# Patient Record
Sex: FEMALE | Race: WHITE | Employment: OTHER | URBAN - METROPOLITAN AREA
[De-identification: names, ages, dates, MRNs, and addresses within clinical notes are randomized per-mention and may not be internally consistent; named-entity substitution may affect disease eponyms.]

---

## 2017-01-18 ENCOUNTER — OFFICE VISIT (OUTPATIENT)
Dept: FAMILY MEDICINE CLINIC | Age: 82
End: 2017-01-18

## 2017-01-18 VITALS
BODY MASS INDEX: 26.03 KG/M2 | HEART RATE: 78 BPM | WEIGHT: 124 LBS | SYSTOLIC BLOOD PRESSURE: 136 MMHG | DIASTOLIC BLOOD PRESSURE: 70 MMHG | HEIGHT: 58 IN | RESPIRATION RATE: 16 BRPM | TEMPERATURE: 97.6 F

## 2017-01-18 DIAGNOSIS — E53.8 VITAMIN B12 DEFICIENCY: ICD-10-CM

## 2017-01-18 DIAGNOSIS — E78.2 MIXED HYPERLIPIDEMIA: ICD-10-CM

## 2017-01-18 DIAGNOSIS — M19.90 OSTEOARTHRITIS, UNSPECIFIED OSTEOARTHRITIS TYPE, UNSPECIFIED SITE: ICD-10-CM

## 2017-01-18 DIAGNOSIS — E11.8 TYPE 2 DIABETES MELLITUS WITH COMPLICATION, UNSPECIFIED LONG TERM INSULIN USE STATUS: ICD-10-CM

## 2017-01-18 DIAGNOSIS — Z23 NEED FOR STREPTOCOCCUS PNEUMONIAE AND INFLUENZA VACCINATION: ICD-10-CM

## 2017-01-18 DIAGNOSIS — I25.10 CORONARY ARTERY DISEASE, ANGINA PRESENCE UNSPECIFIED, UNSPECIFIED VESSEL OR LESION TYPE, UNSPECIFIED WHETHER NATIVE OR TRANSPLANTED HEART: ICD-10-CM

## 2017-01-18 DIAGNOSIS — Z74.09 IMPAIRED MOBILITY AND ACTIVITIES OF DAILY LIVING: ICD-10-CM

## 2017-01-18 DIAGNOSIS — R42 DIZZINESS, NONSPECIFIC: ICD-10-CM

## 2017-01-18 DIAGNOSIS — E11.8 TYPE 2 DIABETES MELLITUS WITH COMPLICATION, UNSPECIFIED LONG TERM INSULIN USE STATUS: Primary | ICD-10-CM

## 2017-01-18 DIAGNOSIS — Z91.81 AT HIGH RISK FOR FALLS: ICD-10-CM

## 2017-01-18 DIAGNOSIS — M75.111 INCOMPLETE TEAR OF RIGHT ROTATOR CUFF: ICD-10-CM

## 2017-01-18 DIAGNOSIS — D64.9 CHRONIC ANEMIA: ICD-10-CM

## 2017-01-18 DIAGNOSIS — Z78.9 IMPAIRED MOBILITY AND ACTIVITIES OF DAILY LIVING: ICD-10-CM

## 2017-01-18 LAB
ALBUMIN SERPL-MCNC: 4.3 G/DL (ref 3.9–4.9)
ALP BLD-CCNC: 70 U/L (ref 40–130)
ALT SERPL-CCNC: 12 U/L (ref 0–33)
ANION GAP SERPL CALCULATED.3IONS-SCNC: 14 MEQ/L (ref 7–13)
AST SERPL-CCNC: 17 U/L (ref 0–35)
BILIRUB SERPL-MCNC: 0.3 MG/DL (ref 0–1.2)
BUN BLDV-MCNC: 14 MG/DL (ref 8–23)
CALCIUM SERPL-MCNC: 9.6 MG/DL (ref 8.6–10.2)
CHLORIDE BLD-SCNC: 103 MEQ/L (ref 98–107)
CHOLESTEROL, TOTAL: 237 MG/DL (ref 0–199)
CO2: 21 MEQ/L (ref 22–29)
CREAT SERPL-MCNC: 0.9 MG/DL (ref 0.5–0.9)
GFR AFRICAN AMERICAN: >60
GFR NON-AFRICAN AMERICAN: 59
GLOBULIN: 3.2 G/DL (ref 2.3–3.5)
GLUCOSE BLD-MCNC: 99 MG/DL (ref 74–109)
HBA1C MFR BLD: 6.2 % (ref 4.8–5.9)
HCT VFR BLD CALC: 34.8 % (ref 37–47)
HDLC SERPL-MCNC: 70 MG/DL (ref 40–59)
HEMOGLOBIN: 11.2 G/DL (ref 12–16)
LDL CHOLESTEROL CALCULATED: 142 MG/DL (ref 0–129)
MCH RBC QN AUTO: 27 PG (ref 27–31.3)
MCHC RBC AUTO-ENTMCNC: 32.2 % (ref 33–37)
MCV RBC AUTO: 83.9 FL (ref 82–100)
PDW BLD-RTO: 14.4 % (ref 11.5–14.5)
PLATELET # BLD: 247 K/UL (ref 130–400)
POTASSIUM SERPL-SCNC: 4 MEQ/L (ref 3.5–5.1)
RBC # BLD: 4.14 M/UL (ref 4.2–5.4)
SODIUM BLD-SCNC: 138 MEQ/L (ref 132–144)
T4 FREE: 1.14 NG/DL (ref 0.93–1.7)
TOTAL PROTEIN: 7.5 G/DL (ref 6.4–8.1)
TRIGL SERPL-MCNC: 125 MG/DL (ref 0–200)
TSH SERPL DL<=0.05 MIU/L-ACNC: 2.07 UIU/ML (ref 0.27–4.2)
WBC # BLD: 7.6 K/UL (ref 4.8–10.8)

## 2017-01-18 PROCEDURE — 90732 PPSV23 VACC 2 YRS+ SUBQ/IM: CPT | Performed by: FAMILY MEDICINE

## 2017-01-18 PROCEDURE — 99214 OFFICE O/P EST MOD 30 MIN: CPT | Performed by: FAMILY MEDICINE

## 2017-01-18 PROCEDURE — 3288F FALL RISK ASSESSMENT DOCD: CPT | Performed by: FAMILY MEDICINE

## 2017-01-18 PROCEDURE — G0009 ADMIN PNEUMOCOCCAL VACCINE: HCPCS | Performed by: FAMILY MEDICINE

## 2017-01-18 PROCEDURE — 90662 IIV NO PRSV INCREASED AG IM: CPT | Performed by: FAMILY MEDICINE

## 2017-01-18 PROCEDURE — G0008 ADMIN INFLUENZA VIRUS VAC: HCPCS | Performed by: FAMILY MEDICINE

## 2017-01-18 PROCEDURE — 96372 THER/PROPH/DIAG INJ SC/IM: CPT | Performed by: FAMILY MEDICINE

## 2017-01-18 RX ORDER — ATORVASTATIN CALCIUM 80 MG/1
80 TABLET, FILM COATED ORAL DAILY
Qty: 90 TABLET | Refills: 1 | Status: SHIPPED | OUTPATIENT
Start: 2017-01-18 | End: 2019-03-09

## 2017-01-18 RX ORDER — NITROGLYCERIN 0.4 MG/1
TABLET SUBLINGUAL
Qty: 25 TABLET | Refills: 3 | Status: SHIPPED | OUTPATIENT
Start: 2017-01-18 | End: 2019-03-09

## 2017-01-18 RX ORDER — KETOROLAC TROMETHAMINE 30 MG/ML
60 INJECTION, SOLUTION INTRAMUSCULAR; INTRAVENOUS ONCE
Status: COMPLETED | OUTPATIENT
Start: 2017-01-18 | End: 2017-01-18

## 2017-01-18 RX ORDER — KETOROLAC TROMETHAMINE 30 MG/ML
30 INJECTION, SOLUTION INTRAMUSCULAR; INTRAVENOUS ONCE
Qty: 1 ML | Refills: 0
Start: 2017-01-18 | End: 2017-01-18 | Stop reason: CLARIF

## 2017-01-18 RX ORDER — ISOSORBIDE MONONITRATE 30 MG/1
TABLET, EXTENDED RELEASE ORAL
Qty: 90 TABLET | Refills: 1 | Status: SHIPPED | OUTPATIENT
Start: 2017-01-18 | End: 2019-03-09

## 2017-01-18 RX ORDER — MECLIZINE HCL 12.5 MG/1
12.5 TABLET ORAL 3 TIMES DAILY PRN
Qty: 200 TABLET | Refills: 1 | Status: SHIPPED | OUTPATIENT
Start: 2017-01-18 | End: 2019-03-09

## 2017-01-18 RX ORDER — METOPROLOL TARTRATE 50 MG/1
TABLET, FILM COATED ORAL
Qty: 180 TABLET | Refills: 1 | Status: SHIPPED | OUTPATIENT
Start: 2017-01-18 | End: 2019-03-09

## 2017-01-18 RX ORDER — CLOPIDOGREL BISULFATE 75 MG/1
TABLET ORAL
Qty: 90 TABLET | Refills: 1 | Status: SHIPPED | OUTPATIENT
Start: 2017-01-18 | End: 2017-03-16 | Stop reason: SDUPTHER

## 2017-01-18 RX ADMIN — KETOROLAC TROMETHAMINE 60 MG: 30 INJECTION, SOLUTION INTRAMUSCULAR; INTRAVENOUS at 16:33

## 2017-01-25 ENCOUNTER — HOSPITAL ENCOUNTER (EMERGENCY)
Age: 82
Discharge: HOME | End: 2017-01-25
Attending: EMERGENCY MEDICINE
Payer: MEDICARE

## 2017-01-25 ENCOUNTER — APPOINTMENT (OUTPATIENT)
Dept: CT IMAGING | Age: 82
End: 2017-01-25
Payer: MEDICARE

## 2017-01-25 VITALS
RESPIRATION RATE: 16 BRPM | TEMPERATURE: 97.5 F | SYSTOLIC BLOOD PRESSURE: 150 MMHG | OXYGEN SATURATION: 99 % | DIASTOLIC BLOOD PRESSURE: 70 MMHG | HEART RATE: 83 BPM

## 2017-01-25 DIAGNOSIS — S09.90XA HEAD INJURY, INITIAL ENCOUNTER: Primary | ICD-10-CM

## 2017-01-25 DIAGNOSIS — S01.81XA FACIAL LACERATION, INITIAL ENCOUNTER: ICD-10-CM

## 2017-01-25 LAB
ANION GAP SERPL CALCULATED.3IONS-SCNC: 17 MEQ/L (ref 7–13)
BASOPHILS ABSOLUTE: 0.1 K/UL (ref 0–0.2)
BASOPHILS RELATIVE PERCENT: 1.2 %
BUN BLDV-MCNC: 15 MG/DL (ref 8–23)
CALCIUM SERPL-MCNC: 10 MG/DL (ref 8.6–10.2)
CHLORIDE BLD-SCNC: 100 MEQ/L (ref 98–107)
CO2: 22 MEQ/L (ref 22–29)
CREAT SERPL-MCNC: 1.02 MG/DL (ref 0.5–0.9)
EOSINOPHILS ABSOLUTE: 1.8 K/UL (ref 0–0.7)
EOSINOPHILS RELATIVE PERCENT: 23.6 %
GFR AFRICAN AMERICAN: >60
GFR NON-AFRICAN AMERICAN: 51
GLUCOSE BLD-MCNC: 136 MG/DL (ref 74–109)
HCT VFR BLD CALC: 38.6 % (ref 37–47)
HEMOGLOBIN: 12.3 G/DL (ref 12–16)
INR BLD: 1
LYMPHOCYTES ABSOLUTE: 1.8 K/UL (ref 1–4.8)
LYMPHOCYTES RELATIVE PERCENT: 24.1 %
MCH RBC QN AUTO: 27.1 PG (ref 27–31.3)
MCHC RBC AUTO-ENTMCNC: 31.8 % (ref 33–37)
MCV RBC AUTO: 85.1 FL (ref 82–100)
MONOCYTES ABSOLUTE: 0.6 K/UL (ref 0.2–0.8)
MONOCYTES RELATIVE PERCENT: 7.4 %
NEUTROPHILS ABSOLUTE: 3.3 K/UL (ref 1.4–6.5)
NEUTROPHILS RELATIVE PERCENT: 43.7 %
PDW BLD-RTO: 14.8 % (ref 11.5–14.5)
PLATELET # BLD: 357 K/UL (ref 130–400)
POTASSIUM SERPL-SCNC: 4.2 MEQ/L (ref 3.5–5.1)
PROTHROMBIN TIME: 10.5 SEC (ref 8.1–13.7)
RBC # BLD: 4.54 M/UL (ref 4.2–5.4)
SODIUM BLD-SCNC: 139 MEQ/L (ref 132–144)
WBC # BLD: 7.6 K/UL (ref 4.8–10.8)

## 2017-01-25 PROCEDURE — 85610 PROTHROMBIN TIME: CPT

## 2017-01-25 PROCEDURE — 70480 CT ORBIT/EAR/FOSSA W/O DYE: CPT

## 2017-01-25 PROCEDURE — 70450 CT HEAD/BRAIN W/O DYE: CPT

## 2017-01-25 PROCEDURE — 36415 COLL VENOUS BLD VENIPUNCTURE: CPT

## 2017-01-25 PROCEDURE — 6370000000 HC RX 637 (ALT 250 FOR IP): Performed by: EMERGENCY MEDICINE

## 2017-01-25 PROCEDURE — 80048 BASIC METABOLIC PNL TOTAL CA: CPT

## 2017-01-25 PROCEDURE — 99284 EMERGENCY DEPT VISIT MOD MDM: CPT

## 2017-01-25 PROCEDURE — 72125 CT NECK SPINE W/O DYE: CPT

## 2017-01-25 PROCEDURE — 85025 COMPLETE CBC W/AUTO DIFF WBC: CPT

## 2017-01-25 RX ORDER — ACETAMINOPHEN 325 MG/1
650 TABLET ORAL ONCE
Status: COMPLETED | OUTPATIENT
Start: 2017-01-25 | End: 2017-01-25

## 2017-01-25 RX ADMIN — ACETAMINOPHEN 650 MG: 325 TABLET ORAL at 19:31

## 2017-01-25 RX ADMIN — Medication: at 19:20

## 2017-01-25 ASSESSMENT — PAIN SCALES - GENERAL
PAINLEVEL_OUTOF10: 7
PAINLEVEL_OUTOF10: 6

## 2017-01-25 ASSESSMENT — PAIN DESCRIPTION - PAIN TYPE: TYPE: ACUTE PAIN

## 2017-01-25 ASSESSMENT — ENCOUNTER SYMPTOMS
SORE THROAT: 0
EYE PAIN: 0
NAUSEA: 0
SHORTNESS OF BREATH: 0
ABDOMINAL PAIN: 0
VOMITING: 0
CHEST TIGHTNESS: 0

## 2017-01-25 ASSESSMENT — PAIN DESCRIPTION - LOCATION: LOCATION: HEAD

## 2017-03-16 DIAGNOSIS — I25.10 CORONARY ARTERY DISEASE, ANGINA PRESENCE UNSPECIFIED, UNSPECIFIED VESSEL OR LESION TYPE, UNSPECIFIED WHETHER NATIVE OR TRANSPLANTED HEART: ICD-10-CM

## 2017-03-16 DIAGNOSIS — E11.8 TYPE 2 DIABETES MELLITUS WITH COMPLICATION, UNSPECIFIED LONG TERM INSULIN USE STATUS: ICD-10-CM

## 2017-03-17 RX ORDER — CLOPIDOGREL BISULFATE 75 MG/1
TABLET ORAL
Qty: 90 TABLET | Refills: 1 | Status: SHIPPED | OUTPATIENT
Start: 2017-03-17 | End: 2017-09-26 | Stop reason: SDUPTHER

## 2017-06-18 RX ORDER — DONEPEZIL HYDROCHLORIDE 5 MG/1
TABLET, FILM COATED ORAL
Qty: 30 TABLET | Refills: 4 | Status: SHIPPED | OUTPATIENT
Start: 2017-06-18 | End: 2017-11-25 | Stop reason: SDUPTHER

## 2017-09-26 DIAGNOSIS — I25.10 CORONARY ARTERY DISEASE, ANGINA PRESENCE UNSPECIFIED, UNSPECIFIED VESSEL OR LESION TYPE, UNSPECIFIED WHETHER NATIVE OR TRANSPLANTED HEART: ICD-10-CM

## 2017-09-26 RX ORDER — MECLIZINE HCL 12.5 MG/1
TABLET ORAL
Qty: 60 TABLET | Refills: 2 | Status: SHIPPED | OUTPATIENT
Start: 2017-09-26 | End: 2019-03-09

## 2017-09-26 RX ORDER — CLOPIDOGREL BISULFATE 75 MG/1
TABLET ORAL
Qty: 90 TABLET | Refills: 0 | Status: SHIPPED | OUTPATIENT
Start: 2017-09-26 | End: 2018-01-22 | Stop reason: SDUPTHER

## 2017-10-26 DIAGNOSIS — E11.8 TYPE 2 DIABETES MELLITUS WITH COMPLICATION, UNSPECIFIED LONG TERM INSULIN USE STATUS: ICD-10-CM

## 2017-11-27 RX ORDER — DONEPEZIL HYDROCHLORIDE 5 MG/1
TABLET, FILM COATED ORAL
Qty: 30 TABLET | Refills: 5 | Status: SHIPPED | OUTPATIENT
Start: 2017-11-27 | End: 2018-04-02 | Stop reason: SDUPTHER

## 2017-12-19 ENCOUNTER — HOSPITAL ENCOUNTER (EMERGENCY)
Age: 82
Discharge: HOME | End: 2017-12-19
Payer: MEDICARE

## 2017-12-19 ENCOUNTER — APPOINTMENT (OUTPATIENT)
Dept: GENERAL RADIOLOGY | Age: 82
End: 2017-12-19
Payer: MEDICARE

## 2017-12-19 VITALS
OXYGEN SATURATION: 98 % | HEART RATE: 66 BPM | BODY MASS INDEX: 25.92 KG/M2 | RESPIRATION RATE: 16 BRPM | TEMPERATURE: 97.9 F | WEIGHT: 124 LBS | SYSTOLIC BLOOD PRESSURE: 160 MMHG | DIASTOLIC BLOOD PRESSURE: 65 MMHG

## 2017-12-19 DIAGNOSIS — L03.114 CELLULITIS OF LEFT ARM: Primary | ICD-10-CM

## 2017-12-19 LAB
ALBUMIN SERPL-MCNC: 4.1 G/DL (ref 3.9–4.9)
ALP BLD-CCNC: 64 U/L (ref 40–130)
ALT SERPL-CCNC: 11 U/L (ref 0–33)
ANION GAP SERPL CALCULATED.3IONS-SCNC: 15 MEQ/L (ref 7–13)
AST SERPL-CCNC: 19 U/L (ref 0–35)
BASOPHILS ABSOLUTE: 0.1 K/UL (ref 0–0.2)
BASOPHILS RELATIVE PERCENT: 0.7 %
BILIRUB SERPL-MCNC: 0.3 MG/DL (ref 0–1.2)
BUN BLDV-MCNC: 11 MG/DL (ref 8–23)
CALCIUM SERPL-MCNC: 9.6 MG/DL (ref 8.6–10.2)
CHLORIDE BLD-SCNC: 101 MEQ/L (ref 98–107)
CO2: 24 MEQ/L (ref 22–29)
CREAT SERPL-MCNC: 0.82 MG/DL (ref 0.5–0.9)
EOSINOPHILS ABSOLUTE: 1.6 K/UL (ref 0–0.7)
EOSINOPHILS RELATIVE PERCENT: 21.7 %
GFR AFRICAN AMERICAN: >60
GFR NON-AFRICAN AMERICAN: >60
GLOBULIN: 3.1 G/DL (ref 2.3–3.5)
GLUCOSE BLD-MCNC: 117 MG/DL (ref 74–109)
HCT VFR BLD CALC: 37.8 % (ref 37–47)
HEMOGLOBIN: 12.9 G/DL (ref 12–16)
LACTIC ACID: 1.1 MMOL/L (ref 0.5–2.2)
LYMPHOCYTES ABSOLUTE: 1.4 K/UL (ref 1–4.8)
LYMPHOCYTES RELATIVE PERCENT: 19.6 %
MCH RBC QN AUTO: 29.8 PG (ref 27–31.3)
MCHC RBC AUTO-ENTMCNC: 34.1 % (ref 33–37)
MCV RBC AUTO: 87.4 FL (ref 82–100)
MONOCYTES ABSOLUTE: 0.6 K/UL (ref 0.2–0.8)
MONOCYTES RELATIVE PERCENT: 7.6 %
NEUTROPHILS ABSOLUTE: 3.7 K/UL (ref 1.4–6.5)
NEUTROPHILS RELATIVE PERCENT: 50.4 %
PDW BLD-RTO: 13.5 % (ref 11.5–14.5)
PLATELET # BLD: 239 K/UL (ref 130–400)
POTASSIUM SERPL-SCNC: 4 MEQ/L (ref 3.5–5.1)
RBC # BLD: 4.32 M/UL (ref 4.2–5.4)
SODIUM BLD-SCNC: 140 MEQ/L (ref 132–144)
TOTAL PROTEIN: 7.2 G/DL (ref 6.4–8.1)
WBC # BLD: 7.3 K/UL (ref 4.8–10.8)

## 2017-12-19 PROCEDURE — 99283 EMERGENCY DEPT VISIT LOW MDM: CPT

## 2017-12-19 PROCEDURE — 85025 COMPLETE CBC W/AUTO DIFF WBC: CPT

## 2017-12-19 PROCEDURE — 87077 CULTURE AEROBIC IDENTIFY: CPT

## 2017-12-19 PROCEDURE — 36415 COLL VENOUS BLD VENIPUNCTURE: CPT

## 2017-12-19 PROCEDURE — 87205 SMEAR GRAM STAIN: CPT

## 2017-12-19 PROCEDURE — 87070 CULTURE OTHR SPECIMN AEROBIC: CPT

## 2017-12-19 PROCEDURE — 87040 BLOOD CULTURE FOR BACTERIA: CPT

## 2017-12-19 PROCEDURE — 80053 COMPREHEN METABOLIC PANEL: CPT

## 2017-12-19 PROCEDURE — 83605 ASSAY OF LACTIC ACID: CPT

## 2017-12-19 PROCEDURE — 87075 CULTR BACTERIA EXCEPT BLOOD: CPT

## 2017-12-19 PROCEDURE — 73090 X-RAY EXAM OF FOREARM: CPT

## 2017-12-19 PROCEDURE — 6370000000 HC RX 637 (ALT 250 FOR IP): Performed by: PHYSICIAN ASSISTANT

## 2017-12-19 RX ORDER — SULFAMETHOXAZOLE AND TRIMETHOPRIM 800; 160 MG/1; MG/1
1 TABLET ORAL 2 TIMES DAILY
Qty: 20 TABLET | Refills: 0 | Status: SHIPPED | OUTPATIENT
Start: 2017-12-19 | End: 2017-12-29

## 2017-12-19 RX ORDER — CEPHALEXIN 500 MG/1
500 CAPSULE ORAL ONCE
Status: COMPLETED | OUTPATIENT
Start: 2017-12-19 | End: 2017-12-19

## 2017-12-19 RX ORDER — 0.9 % SODIUM CHLORIDE 0.9 %
1000 INTRAVENOUS SOLUTION INTRAVENOUS ONCE
Status: DISCONTINUED | OUTPATIENT
Start: 2017-12-19 | End: 2017-12-19

## 2017-12-19 RX ORDER — SULFAMETHOXAZOLE AND TRIMETHOPRIM 800; 160 MG/1; MG/1
1 TABLET ORAL ONCE
Status: COMPLETED | OUTPATIENT
Start: 2017-12-19 | End: 2017-12-19

## 2017-12-19 RX ORDER — CEPHALEXIN 500 MG/1
500 CAPSULE ORAL 4 TIMES DAILY
Qty: 40 CAPSULE | Refills: 0 | Status: SHIPPED | OUTPATIENT
Start: 2017-12-19 | End: 2019-03-09

## 2017-12-19 RX ADMIN — CEPHALEXIN 500 MG: 500 CAPSULE ORAL at 19:31

## 2017-12-19 RX ADMIN — SULFAMETHOXAZOLE AND TRIMETHOPRIM 1 TABLET: 800; 160 TABLET ORAL at 19:31

## 2017-12-19 ASSESSMENT — ENCOUNTER SYMPTOMS
VOMITING: 0
ABDOMINAL PAIN: 0
NAUSEA: 0
SHORTNESS OF BREATH: 0
DIARRHEA: 0
COUGH: 0

## 2017-12-19 NOTE — ED PROVIDER NOTES
[address] [facility] ED  eMERGENCY dEPARTMENT eNCOUnter      Pt Name: Pete Salas  MRN: [id number]  [place] 2/10/1928  Date of evaluation: 12/19/2017  Provider: Flakita Dodson PA-C      HISTORY OF PRESENT ILLNESS    Pete Salas is a 80 y.o. female who presents to the Emergency Department with Wound on left forearm. The patient states she was cleaning something and her forearm got caught. Over the past 4 days there has been increased redness swelling and warmth. Patient does have a history of cellulitis that required IV antibiotics and admission to the hospital.  She denies fevers or chills. She denies pain. She has not used any medication. REVIEW OF SYSTEMS       Review of Systems   Constitutional: Negative for chills, diaphoresis, fatigue and fever. HENT: Negative for congestion. Respiratory: Negative for cough and shortness of breath. Cardiovascular: Negative for chest pain and palpitations. Gastrointestinal: Negative for abdominal pain, diarrhea, nausea and vomiting. Genitourinary: Negative for dysuria and flank pain. Skin: Positive for wound. Negative for rash. Neurological: Negative for dizziness, light-headedness and headaches.          PAST MEDICAL HISTORY     Past Medical History:   Diagnosis Date    CAD (coronary artery disease)     Chronic anemia     [place] (hard of hearing)     Hyperlipidemia     Hypertension     Type II or unspecified type diabetes mellitus without mention of complication, not stated as uncontrolled          SURGICAL HISTORY       Past Surgical History:   Procedure Laterality Date    BLADDER SUSPENSION      CARDIAC CATHETERIZATION      CATARACT REMOVAL      CHOLECYSTECTOMY  1990    COLONOSCOPY  1-07    CORONARY ANGIOPLASTY WITH STENT PLACEMENT      CORONARY ANGIOPLASTY WITH STENT PLACEMENT  5-08    FOOT SURGERY      L    HYSTERECTOMY  1964         CURRENT MEDICATIONS       Previous Medications    ASPIRIN 81 MG TABLET    Take 81 mg by mouth time. She appears well-developed and well-nourished. She is active. No distress. HENT:   Head: Normocephalic and atraumatic. Mouth/Throat: Mucous membranes are normal.   Neck: Normal range of motion. Neck supple. Cardiovascular: Normal rate, regular rhythm, normal heart sounds, intact distal pulses and normal pulses. Pulmonary/Chest: Effort normal and breath sounds normal.   Abdominal: Soft. Normal appearance and bowel sounds are normal. There is no tenderness. Musculoskeletal:        Arms:  Is superficial abrasion noted to posterior forearm. Area of surrounding cellulitis or redness and warmth is noted. Neurological: She is alert and oriented to person, place, and time. She has normal strength. Skin: Skin is warm, dry and intact. No rash noted. She is not diaphoretic. Nursing note and vitals reviewed. All other labs were within normal range or not returned as of this dictation. EMERGENCY DEPARTMENT COURSE and DIFFERENTIAL DIAGNOSIS/MDM:   Vitals:    Vitals:    12/19/17 1736   BP: (!) 183/89   Pulse: 92   Resp: 18   Temp: 97.9 °F (36.6 °C)   TempSrc: Oral   SpO2: 97%   Weight: 124 lb (56.2 kg)       ED Course        Patient presents with cellulitis of left arm. It is not circumferential.  It is localized with a surrounding area of erythema and warmth. Patient has been afebrile and not tachycardic. Patient does not have a white count. At this time either the patient is well suited to try outpatient therapy. Patient also does not want to stay in the hospital.  Lipase factors I believe the patient is safe to be outpatient. She was counseled on signs and symptoms are felt outpatient therapy and when to return to the emergency department. I advised him to follow-up with her family physician in one to 2 days for wound recheck. I advised him on cleaning and bacitracin to the area. I advised him to return to the emergency department for new or worsening symptoms.   Patient family both verbalized understanding of this plan. Patient stable and ready for discharge. PROCEDURES:  Unless otherwise noted below, none     Procedures      FINAL IMPRESSION      1.  Cellulitis of left arm          DISPOSITION/PLAN   DISPOSITION Decision To Discharge 12/19/2017 07:24:31 PM          [address] PA-C (electronically signed)  Attending Emergency Physician       [address], PA-C  12/19/17 192

## 2017-12-22 LAB
ANAEROBIC CULTURE: ABNORMAL
GRAM STAIN RESULT: ABNORMAL
ORGANISM: ABNORMAL
WOUND/ABSCESS: ABNORMAL

## 2017-12-25 LAB
BLOOD CULTURE, ROUTINE: NORMAL
CULTURE, BLOOD 2: NORMAL

## 2018-01-22 DIAGNOSIS — I25.10 CORONARY ARTERY DISEASE, ANGINA PRESENCE UNSPECIFIED, UNSPECIFIED VESSEL OR LESION TYPE, UNSPECIFIED WHETHER NATIVE OR TRANSPLANTED HEART: ICD-10-CM

## 2018-01-22 RX ORDER — CLOPIDOGREL BISULFATE 75 MG/1
TABLET ORAL
Qty: 90 TABLET | Refills: 0 | Status: SHIPPED | OUTPATIENT
Start: 2018-01-22 | End: 2019-03-09 | Stop reason: SDUPTHER

## 2018-02-02 DIAGNOSIS — I25.10 CORONARY ARTERY DISEASE, ANGINA PRESENCE UNSPECIFIED, UNSPECIFIED VESSEL OR LESION TYPE, UNSPECIFIED WHETHER NATIVE OR TRANSPLANTED HEART: ICD-10-CM

## 2018-02-02 DIAGNOSIS — E11.8 TYPE 2 DIABETES MELLITUS WITH COMPLICATION, UNSPECIFIED LONG TERM INSULIN USE STATUS: ICD-10-CM

## 2018-02-03 RX ORDER — CLOPIDOGREL BISULFATE 75 MG/1
TABLET ORAL
Qty: 90 TABLET | Refills: 0 | Status: SHIPPED | OUTPATIENT
Start: 2018-02-03 | End: 2018-08-27 | Stop reason: SDUPTHER

## 2018-04-02 ENCOUNTER — TELEPHONE (OUTPATIENT)
Dept: FAMILY MEDICINE CLINIC | Age: 83
End: 2018-04-02

## 2018-04-02 RX ORDER — DONEPEZIL HYDROCHLORIDE 5 MG/1
TABLET, FILM COATED ORAL
Qty: 30 TABLET | Refills: 5 | Status: SHIPPED | OUTPATIENT
Start: 2018-04-02 | End: 2019-03-11 | Stop reason: SDUPTHER

## 2018-05-01 DIAGNOSIS — E11.8 TYPE 2 DIABETES MELLITUS WITH COMPLICATION, UNSPECIFIED LONG TERM INSULIN USE STATUS: ICD-10-CM

## 2018-05-02 RX ORDER — NITROGLYCERIN 0.4 MG/1
TABLET SUBLINGUAL
Qty: 25 TABLET | Refills: 1 | Status: SHIPPED | OUTPATIENT
Start: 2018-05-02

## 2018-08-27 DIAGNOSIS — I25.10 CORONARY ARTERY DISEASE, ANGINA PRESENCE UNSPECIFIED, UNSPECIFIED VESSEL OR LESION TYPE, UNSPECIFIED WHETHER NATIVE OR TRANSPLANTED HEART: ICD-10-CM

## 2018-08-27 RX ORDER — CLOPIDOGREL BISULFATE 75 MG/1
TABLET ORAL
Qty: 90 TABLET | Refills: 0 | Status: SHIPPED | OUTPATIENT
Start: 2018-08-27 | End: 2019-03-11 | Stop reason: SDUPTHER

## 2018-09-29 ENCOUNTER — APPOINTMENT (OUTPATIENT)
Dept: GENERAL RADIOLOGY | Age: 83
End: 2018-09-29
Payer: MEDICARE

## 2018-09-29 ENCOUNTER — HOSPITAL ENCOUNTER (EMERGENCY)
Age: 83
Discharge: HOME | End: 2018-09-30
Attending: EMERGENCY MEDICINE
Payer: MEDICARE

## 2018-09-29 ENCOUNTER — APPOINTMENT (OUTPATIENT)
Dept: CT IMAGING | Age: 83
End: 2018-09-29
Payer: MEDICARE
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
## 2018-09-29 DIAGNOSIS — M25.521 RIGHT ELBOW PAIN: ICD-10-CM

## 2018-09-29 DIAGNOSIS — S09.90XA CLOSED HEAD INJURY, INITIAL ENCOUNTER: ICD-10-CM

## 2018-09-29 LAB
ALBUMIN SERPL-MCNC: 4.1 G/DL (ref 3.9–4.9)
ALP BLD-CCNC: 66 U/L (ref 40–130)
ALT SERPL-CCNC: 9 U/L (ref 0–33)
ANION GAP SERPL CALCULATED.3IONS-SCNC: 9 MEQ/L (ref 7–13)
AST SERPL-CCNC: 17 U/L (ref 0–35)
BASOPHILS ABSOLUTE: 0.1 K/UL (ref 0–0.2)
BASOPHILS RELATIVE PERCENT: 0.9 %
BILIRUB SERPL-MCNC: <0.2 MG/DL (ref 0–1.2)
BUN BLDV-MCNC: 11 MG/DL (ref 8–23)
CALCIUM SERPL-MCNC: 9.4 MG/DL (ref 8.6–10.2)
CHLORIDE BLD-SCNC: 98 MEQ/L (ref 98–107)
CO2: 25 MEQ/L (ref 22–29)
CREAT SERPL-MCNC: 0.85 MG/DL (ref 0.5–0.9)
EKG ATRIAL RATE: 72 BPM
EKG P AXIS: 82 DEGREES
EKG P-R INTERVAL: 182 MS
EKG Q-T INTERVAL: 370 MS
EKG QRS DURATION: 76 MS
EKG QTC CALCULATION (BAZETT): 405 MS
EKG R AXIS: 18 DEGREES
EKG T AXIS: 84 DEGREES
EKG VENTRICULAR RATE: 72 BPM
EOSINOPHILS ABSOLUTE: 1.8 K/UL (ref 0–0.7)
EOSINOPHILS RELATIVE PERCENT: 31.3 %
GFR AFRICAN AMERICAN: >60
GFR NON-AFRICAN AMERICAN: >60
GLOBULIN: 3 G/DL (ref 2.3–3.5)
GLUCOSE BLD-MCNC: 122 MG/DL (ref 74–109)
HCT VFR BLD CALC: 36.3 % (ref 37–47)
HEMOGLOBIN: 12.3 G/DL (ref 12–16)
LYMPHOCYTES ABSOLUTE: 1.4 K/UL (ref 1–4.8)
LYMPHOCYTES RELATIVE PERCENT: 24.2 %
MAGNESIUM: 2 MG/DL (ref 1.7–2.3)
MCH RBC QN AUTO: 31.5 PG (ref 27–31.3)
MCHC RBC AUTO-ENTMCNC: 33.9 % (ref 33–37)
MCV RBC AUTO: 92.7 FL (ref 82–100)
MONOCYTES ABSOLUTE: 0.5 K/UL (ref 0.2–0.8)
MONOCYTES RELATIVE PERCENT: 8.1 %
NEUTROPHILS ABSOLUTE: 2.1 K/UL (ref 1.4–6.5)
NEUTROPHILS RELATIVE PERCENT: 35.5 %
PDW BLD-RTO: 13.7 % (ref 11.5–14.5)
PLATELET # BLD: 257 K/UL (ref 130–400)
POTASSIUM SERPL-SCNC: 4.2 MEQ/L (ref 3.5–5.1)
RBC # BLD: 3.91 M/UL (ref 4.2–5.4)
SODIUM BLD-SCNC: 132 MEQ/L (ref 132–144)
TOTAL PROTEIN: 7.1 G/DL (ref 6.4–8.1)
TROPONIN: <0.01 NG/ML (ref 0–0.01)
WBC # BLD: 5.8 K/UL (ref 4.8–10.8)

## 2018-09-29 PROCEDURE — 71046 X-RAY EXAM CHEST 2 VIEWS: CPT

## 2018-09-29 PROCEDURE — 96375 TX/PRO/DX INJ NEW DRUG ADDON: CPT

## 2018-09-29 PROCEDURE — 70450 CT HEAD/BRAIN W/O DYE: CPT

## 2018-09-29 PROCEDURE — 73080 X-RAY EXAM OF ELBOW: CPT

## 2018-09-29 PROCEDURE — 96374 THER/PROPH/DIAG INJ IV PUSH: CPT

## 2018-09-29 PROCEDURE — 83735 ASSAY OF MAGNESIUM: CPT

## 2018-09-29 PROCEDURE — 36415 COLL VENOUS BLD VENIPUNCTURE: CPT

## 2018-09-29 PROCEDURE — 93005 ELECTROCARDIOGRAM TRACING: CPT

## 2018-09-29 PROCEDURE — 81001 URINALYSIS AUTO W/SCOPE: CPT

## 2018-09-29 PROCEDURE — 2580000003 HC RX 258: Performed by: EMERGENCY MEDICINE

## 2018-09-29 PROCEDURE — 6360000002 HC RX W HCPCS: Performed by: EMERGENCY MEDICINE

## 2018-09-29 PROCEDURE — 80053 COMPREHEN METABOLIC PANEL: CPT

## 2018-09-29 PROCEDURE — 99285 EMERGENCY DEPT VISIT HI MDM: CPT

## 2018-09-29 PROCEDURE — 85025 COMPLETE CBC W/AUTO DIFF WBC: CPT

## 2018-09-29 PROCEDURE — 84484 ASSAY OF TROPONIN QUANT: CPT

## 2018-09-29 RX ORDER — 0.9 % SODIUM CHLORIDE 0.9 %
1000 INTRAVENOUS SOLUTION INTRAVENOUS ONCE
Status: COMPLETED | OUTPATIENT
Start: 2018-09-29 | End: 2018-09-30

## 2018-09-29 RX ORDER — ONDANSETRON 2 MG/ML
4 INJECTION INTRAMUSCULAR; INTRAVENOUS ONCE
Status: COMPLETED | OUTPATIENT
Start: 2018-09-29 | End: 2018-09-29

## 2018-09-29 RX ADMIN — HYDROMORPHONE HYDROCHLORIDE 0.5 MG: 1 INJECTION, SOLUTION INTRAMUSCULAR; INTRAVENOUS; SUBCUTANEOUS at 23:21

## 2018-09-29 RX ADMIN — ONDANSETRON 4 MG: 2 INJECTION INTRAMUSCULAR; INTRAVENOUS at 23:21

## 2018-09-29 RX ADMIN — SODIUM CHLORIDE 1000 ML: 9 INJECTION, SOLUTION INTRAVENOUS at 23:22

## 2018-09-29 ASSESSMENT — ENCOUNTER SYMPTOMS
SHORTNESS OF BREATH: 0
ABDOMINAL PAIN: 0
BACK PAIN: 0
SORE THROAT: 0
NAUSEA: 0
DIARRHEA: 0
COUGH: 0
VOMITING: 0

## 2018-09-29 ASSESSMENT — PAIN SCALES - GENERAL
PAINLEVEL_OUTOF10: 8
PAINLEVEL_OUTOF10: 8

## 2018-09-29 ASSESSMENT — PAIN DESCRIPTION - PAIN TYPE: TYPE: ACUTE PAIN

## 2018-09-29 ASSESSMENT — PAIN DESCRIPTION - DESCRIPTORS: DESCRIPTORS: ACHING

## 2018-09-29 ASSESSMENT — PAIN DESCRIPTION - FREQUENCY: FREQUENCY: CONTINUOUS

## 2018-09-29 ASSESSMENT — PAIN DESCRIPTION - ORIENTATION: ORIENTATION: RIGHT

## 2018-09-29 ASSESSMENT — PAIN DESCRIPTION - LOCATION: LOCATION: HEAD

## 2018-09-30 VITALS
BODY MASS INDEX: 28.79 KG/M2 | RESPIRATION RATE: 18 BRPM | DIASTOLIC BLOOD PRESSURE: 72 MMHG | TEMPERATURE: 98.2 F | OXYGEN SATURATION: 98 % | SYSTOLIC BLOOD PRESSURE: 125 MMHG | HEART RATE: 82 BPM | HEIGHT: 56 IN | WEIGHT: 128 LBS

## 2018-09-30 LAB
BACTERIA: ABNORMAL /HPF
BILIRUBIN URINE: NEGATIVE
BLOOD, URINE: NEGATIVE
CLARITY: ABNORMAL
COLOR: YELLOW
EPITHELIAL CELLS, UA: ABNORMAL /HPF
GLUCOSE URINE: NEGATIVE MG/DL
KETONES, URINE: NEGATIVE MG/DL
LEUKOCYTE ESTERASE, URINE: ABNORMAL
NITRITE, URINE: NEGATIVE
PH UA: 6 (ref 5–9)
PROTEIN UA: NEGATIVE MG/DL
RBC UA: ABNORMAL /HPF (ref 0–2)
SPECIFIC GRAVITY UA: 1.01 (ref 1–1.03)
UROBILINOGEN, URINE: 0.2 E.U./DL
WBC UA: ABNORMAL /HPF (ref 0–5)

## 2018-09-30 RX ORDER — HYDROCODONE BITARTRATE AND ACETAMINOPHEN 5; 325 MG/1; MG/1
1 TABLET ORAL EVERY 6 HOURS PRN
Qty: 10 TABLET | Refills: 0 | Status: SHIPPED | OUTPATIENT
Start: 2018-09-30 | End: 2018-10-03

## 2018-09-30 ASSESSMENT — PAIN SCALES - GENERAL: PAINLEVEL_OUTOF10: 5

## 2018-09-30 NOTE — ED PROVIDER NOTES
None         PHYSICAL EXAM       ED Triage Vitals [09/29/18 2244]   BP Temp Temp src Pulse Resp SpO2 Height Weight   138/71 -- -- -- 16 99 % 4' 8\" (1.422 m) 128 lb (58.1 kg)       Physical Exam   Constitutional: She is oriented to person, place, and time. She appears well-developed. HENT:   Head: Normocephalic. Right Ear: External ear normal.   Left Ear: External ear normal.   Mouth/Throat: Oropharynx is clear and moist.   2 cm cephalhematoma noted to R temporal area   Eyes: Pupils are equal, round, and reactive to light. Conjunctivae are normal.   Neck: Normal range of motion. Neck supple. Cardiovascular: Normal rate, regular rhythm and normal heart sounds. Pulmonary/Chest: Effort normal and breath sounds normal.   Abdominal: Soft. Bowel sounds are normal. She exhibits no distension. There is no tenderness. Musculoskeletal: Normal range of motion. +Tenderness to palpation over R elbow. No gross deformity. RUE neurovascularly intact. Neurological: She is alert and oriented to person, place, and time. Skin: Skin is warm and dry. Psychiatric: She has a normal mood and affect. Nursing note and vitals reviewed. MDM  81 yo female presents to the ED s/p fall with dizziness. Pt is afebrile, hemodynamically stable. Pt given 1 L NS, IV dilaudid, IV zofran with moderate relief. Labs remarkable for glucose 122. EKG shows NSR with HR 72, normal axis, normal intervals, no ST changes. CT head negative. XR of elbow negative. CXR negative. Pt reassessed and feels much better. Unsure of etiology of dizziness. Pt requesting to go home. Pt educated about the results, given dizziness/syncope warning signs, prescription for norco and f/u with pcp. Pt understands plan. FINAL IMPRESSION      1. Fall, initial encounter    2. Closed head injury, initial encounter    3.  Right elbow pain          DISPOSITION/PLAN   DISPOSITION Decision To Discharge 09/30/2018 01:29:50 AM        DISCHARGE MEDICATIONS:  New Prescriptions    HYDROCODONE-ACETAMINOPHEN (NORCO) 5-325 MG PER TABLET    Take 1 tablet by mouth every 6 hours as needed for Pain for up to 3 days. Intended supply: 3 days. Take lowest dose possible to manage pain.             Layo Farris MD (electronically signed)  Attending Emergency Physician            Layo Farris MD  09/30/18 2693

## 2018-10-01 PROCEDURE — 93010 ELECTROCARDIOGRAM REPORT: CPT | Performed by: INTERNAL MEDICINE

## 2018-10-15 DIAGNOSIS — I25.10 CORONARY ARTERY DISEASE, ANGINA PRESENCE UNSPECIFIED, UNSPECIFIED VESSEL OR LESION TYPE, UNSPECIFIED WHETHER NATIVE OR TRANSPLANTED HEART: ICD-10-CM

## 2019-03-09 ENCOUNTER — OFFICE VISIT (OUTPATIENT)
Dept: FAMILY MEDICINE CLINIC | Age: 84
End: 2019-03-09
Payer: MEDICARE

## 2019-03-09 VITALS
SYSTOLIC BLOOD PRESSURE: 148 MMHG | TEMPERATURE: 97.7 F | HEART RATE: 76 BPM | DIASTOLIC BLOOD PRESSURE: 56 MMHG | BODY MASS INDEX: 24.75 KG/M2 | WEIGHT: 110 LBS | RESPIRATION RATE: 16 BRPM | HEIGHT: 56 IN

## 2019-03-09 DIAGNOSIS — Z91.81 AT HIGH RISK FOR FALLS: ICD-10-CM

## 2019-03-09 DIAGNOSIS — E11.8 TYPE 2 DIABETES MELLITUS WITH COMPLICATION, WITHOUT LONG-TERM CURRENT USE OF INSULIN (HCC): ICD-10-CM

## 2019-03-09 DIAGNOSIS — K90.49 MILK INTOLERANCE: ICD-10-CM

## 2019-03-09 DIAGNOSIS — D64.9 CHRONIC ANEMIA: ICD-10-CM

## 2019-03-09 DIAGNOSIS — E78.2 MIXED HYPERLIPIDEMIA: ICD-10-CM

## 2019-03-09 DIAGNOSIS — Z78.9 IMPAIRED MOBILITY AND ACTIVITIES OF DAILY LIVING: ICD-10-CM

## 2019-03-09 DIAGNOSIS — H53.8 BLURRED VISION: ICD-10-CM

## 2019-03-09 DIAGNOSIS — R19.4 CHANGE IN BOWEL HABITS: ICD-10-CM

## 2019-03-09 DIAGNOSIS — Z74.09 IMPAIRED MOBILITY AND ACTIVITIES OF DAILY LIVING: ICD-10-CM

## 2019-03-09 DIAGNOSIS — E11.8 TYPE 2 DIABETES MELLITUS WITH COMPLICATION, WITHOUT LONG-TERM CURRENT USE OF INSULIN (HCC): Primary | ICD-10-CM

## 2019-03-09 DIAGNOSIS — I25.10 CORONARY ARTERY DISEASE, ANGINA PRESENCE UNSPECIFIED, UNSPECIFIED VESSEL OR LESION TYPE, UNSPECIFIED WHETHER NATIVE OR TRANSPLANTED HEART: ICD-10-CM

## 2019-03-09 DIAGNOSIS — Z23 NEED FOR INFLUENZA VACCINATION: ICD-10-CM

## 2019-03-09 LAB
ALBUMIN SERPL-MCNC: 4.1 G/DL (ref 3.5–4.6)
ALP BLD-CCNC: 76 U/L (ref 40–130)
ALT SERPL-CCNC: 8 U/L (ref 0–33)
ANION GAP SERPL CALCULATED.3IONS-SCNC: 15 MEQ/L (ref 9–15)
AST SERPL-CCNC: 16 U/L (ref 0–35)
BILIRUB SERPL-MCNC: 0.3 MG/DL (ref 0.2–0.7)
BUN BLDV-MCNC: 14 MG/DL (ref 8–23)
CALCIUM SERPL-MCNC: 8.7 MG/DL (ref 8.5–9.9)
CHLORIDE BLD-SCNC: 102 MEQ/L (ref 95–107)
CHOLESTEROL, TOTAL: 200 MG/DL (ref 0–199)
CO2: 24 MEQ/L (ref 20–31)
CREAT SERPL-MCNC: 0.96 MG/DL (ref 0.5–0.9)
GFR AFRICAN AMERICAN: >60
GFR NON-AFRICAN AMERICAN: 54.5
GLOBULIN: 3.4 G/DL (ref 2.3–3.5)
GLUCOSE BLD-MCNC: 96 MG/DL (ref 70–99)
HBA1C MFR BLD: 6.4 % (ref 4.8–5.9)
HCT VFR BLD CALC: 35.9 % (ref 37–47)
HDLC SERPL-MCNC: 68 MG/DL (ref 40–59)
HEMOGLOBIN: 11.6 G/DL (ref 12–16)
LDL CHOLESTEROL CALCULATED: 105 MG/DL (ref 0–129)
MCH RBC QN AUTO: 28.8 PG (ref 27–31.3)
MCHC RBC AUTO-ENTMCNC: 32.4 % (ref 33–37)
MCV RBC AUTO: 88.7 FL (ref 82–100)
PDW BLD-RTO: 16 % (ref 11.5–14.5)
PLATELET # BLD: 284 K/UL (ref 130–400)
POTASSIUM SERPL-SCNC: 4.4 MEQ/L (ref 3.4–4.9)
RBC # BLD: 4.05 M/UL (ref 4.2–5.4)
SODIUM BLD-SCNC: 141 MEQ/L (ref 135–144)
T4 FREE: 1.13 NG/DL (ref 0.84–1.68)
TOTAL PROTEIN: 7.5 G/DL (ref 6.3–8)
TRIGL SERPL-MCNC: 133 MG/DL (ref 0–150)
TSH SERPL DL<=0.05 MIU/L-ACNC: 1.73 UIU/ML (ref 0.44–3.86)
WBC # BLD: 5.6 K/UL (ref 4.8–10.8)

## 2019-03-09 PROCEDURE — 90662 IIV NO PRSV INCREASED AG IM: CPT | Performed by: FAMILY MEDICINE

## 2019-03-09 PROCEDURE — G0008 ADMIN INFLUENZA VIRUS VAC: HCPCS | Performed by: FAMILY MEDICINE

## 2019-03-09 PROCEDURE — 99214 OFFICE O/P EST MOD 30 MIN: CPT | Performed by: FAMILY MEDICINE

## 2019-03-09 ASSESSMENT — PATIENT HEALTH QUESTIONNAIRE - PHQ9
1. LITTLE INTEREST OR PLEASURE IN DOING THINGS: 0
2. FEELING DOWN, DEPRESSED OR HOPELESS: 1
SUM OF ALL RESPONSES TO PHQ QUESTIONS 1-9: 1
SUM OF ALL RESPONSES TO PHQ9 QUESTIONS 1 & 2: 1
SUM OF ALL RESPONSES TO PHQ QUESTIONS 1-9: 1

## 2019-03-11 DIAGNOSIS — I25.10 CORONARY ARTERY DISEASE, ANGINA PRESENCE UNSPECIFIED, UNSPECIFIED VESSEL OR LESION TYPE, UNSPECIFIED WHETHER NATIVE OR TRANSPLANTED HEART: ICD-10-CM

## 2019-03-11 RX ORDER — DONEPEZIL HYDROCHLORIDE 5 MG/1
TABLET, FILM COATED ORAL
Qty: 30 TABLET | Refills: 5 | Status: SHIPPED | OUTPATIENT
Start: 2019-03-11

## 2019-03-11 RX ORDER — CLOPIDOGREL BISULFATE 75 MG/1
TABLET ORAL
Qty: 90 TABLET | Refills: 0 | Status: ON HOLD | OUTPATIENT
Start: 2019-03-11 | End: 2021-03-13 | Stop reason: HOSPADM

## 2019-03-12 ENCOUNTER — TELEPHONE (OUTPATIENT)
Dept: FAMILY MEDICINE CLINIC | Age: 84
End: 2019-03-12

## 2019-03-12 LAB
ALLERGEN CLAMS IGE: 0.22 KU/L
ALLERGEN CODFISH IGE: <0.1 KU/L
ALLERGEN CORN IGE: 0.76 KU/L
ALLERGEN COW MILK IGE: 0.33 KU/L
ALLERGEN EGG WHITE IGE: 0.12 KU/L
ALLERGEN PEANUT (F13) IGE: 0.22 KU/L
ALLERGEN SCALLOP IGE: 0.29 KU/L
ALLERGEN SEE NOTE: ABNORMAL
ALLERGEN SHRIMP IGE: 0.14 KU/L
ALLERGEN SOYBEAN IGE: 0.21 KU/L
ALLERGEN WALNUT IGE: 0.17 KU/L
ALLERGEN WHEAT IGE: 0.28 KU/L
IGE: 531 KU/L

## 2019-05-03 ENCOUNTER — APPOINTMENT (OUTPATIENT)
Dept: CT IMAGING | Age: 84
End: 2019-05-03
Payer: MEDICARE

## 2019-05-03 ENCOUNTER — HOSPITAL ENCOUNTER (EMERGENCY)
Age: 84
Discharge: HOME | End: 2019-05-03
Payer: MEDICARE

## 2019-05-03 ENCOUNTER — APPOINTMENT (OUTPATIENT)
Dept: GENERAL RADIOLOGY | Age: 84
End: 2019-05-03
Payer: MEDICARE

## 2019-05-03 VITALS
BODY MASS INDEX: 33.41 KG/M2 | DIASTOLIC BLOOD PRESSURE: 95 MMHG | OXYGEN SATURATION: 99 % | HEART RATE: 78 BPM | WEIGHT: 149 LBS | TEMPERATURE: 97.6 F | SYSTOLIC BLOOD PRESSURE: 164 MMHG | RESPIRATION RATE: 19 BRPM

## 2019-05-03 DIAGNOSIS — R11.2 NON-INTRACTABLE VOMITING WITH NAUSEA, UNSPECIFIED VOMITING TYPE: ICD-10-CM

## 2019-05-03 DIAGNOSIS — R42 DIZZINESS: Primary | ICD-10-CM

## 2019-05-03 LAB
ALBUMIN SERPL-MCNC: 4.3 G/DL (ref 3.5–4.6)
ALP BLD-CCNC: 78 U/L (ref 40–130)
ALT SERPL-CCNC: 11 U/L (ref 0–33)
ANION GAP SERPL CALCULATED.3IONS-SCNC: 14 MEQ/L (ref 9–15)
AST SERPL-CCNC: 19 U/L (ref 0–35)
BACTERIA: NEGATIVE /HPF
BASOPHILS ABSOLUTE: 0.1 K/UL (ref 0–0.2)
BASOPHILS RELATIVE PERCENT: 1.1 %
BILIRUB SERPL-MCNC: 0.6 MG/DL (ref 0.2–0.7)
BILIRUBIN URINE: NEGATIVE
BLOOD, URINE: ABNORMAL
BUN BLDV-MCNC: 14 MG/DL (ref 8–23)
CALCIUM SERPL-MCNC: 9.4 MG/DL (ref 8.5–9.9)
CHLORIDE BLD-SCNC: 94 MEQ/L (ref 95–107)
CLARITY: CLEAR
CO2: 21 MEQ/L (ref 20–31)
COLOR: YELLOW
CREAT SERPL-MCNC: 0.74 MG/DL (ref 0.5–0.9)
EKG ATRIAL RATE: 75 BPM
EKG P AXIS: 114 DEGREES
EKG P-R INTERVAL: 182 MS
EKG Q-T INTERVAL: 412 MS
EKG QRS DURATION: 82 MS
EKG QTC CALCULATION (BAZETT): 460 MS
EKG R AXIS: 46 DEGREES
EKG T AXIS: 76 DEGREES
EKG VENTRICULAR RATE: 75 BPM
EOSINOPHILS ABSOLUTE: 0.7 K/UL (ref 0–0.7)
EOSINOPHILS RELATIVE PERCENT: 12.7 %
EPITHELIAL CELLS, UA: ABNORMAL /HPF (ref 0–5)
GFR AFRICAN AMERICAN: >60
GFR NON-AFRICAN AMERICAN: >60
GLOBULIN: 3.3 G/DL (ref 2.3–3.5)
GLUCOSE BLD-MCNC: 160 MG/DL (ref 70–99)
GLUCOSE URINE: 100 MG/DL
HCT VFR BLD CALC: 34.3 % (ref 37–47)
HEMOGLOBIN: 11.8 G/DL (ref 12–16)
HYALINE CASTS: ABNORMAL /HPF (ref 0–5)
KETONES, URINE: NEGATIVE MG/DL
LACTIC ACID: 1.1 MMOL/L (ref 0.5–2.2)
LEUKOCYTE ESTERASE, URINE: NEGATIVE
LIPASE: 49 U/L (ref 12–95)
LYMPHOCYTES ABSOLUTE: 0.6 K/UL (ref 1–4.8)
LYMPHOCYTES RELATIVE PERCENT: 11 %
MAGNESIUM: 1.9 MG/DL (ref 1.7–2.4)
MCH RBC QN AUTO: 30.1 PG (ref 27–31.3)
MCHC RBC AUTO-ENTMCNC: 34.4 % (ref 33–37)
MCV RBC AUTO: 87.3 FL (ref 82–100)
MONOCYTES ABSOLUTE: 0.2 K/UL (ref 0.2–0.8)
MONOCYTES RELATIVE PERCENT: 4.3 %
NEUTROPHILS ABSOLUTE: 4.1 K/UL (ref 1.4–6.5)
NEUTROPHILS RELATIVE PERCENT: 70.9 %
NITRITE, URINE: NEGATIVE
PDW BLD-RTO: 15.1 % (ref 11.5–14.5)
PH UA: 5.5 (ref 5–9)
PLATELET # BLD: 248 K/UL (ref 130–400)
POTASSIUM SERPL-SCNC: 4 MEQ/L (ref 3.4–4.9)
PROTEIN UA: 30 MG/DL
RBC # BLD: 3.93 M/UL (ref 4.2–5.4)
RBC UA: ABNORMAL /HPF (ref 0–5)
SODIUM BLD-SCNC: 129 MEQ/L (ref 135–144)
SPECIFIC GRAVITY UA: 1.01 (ref 1–1.03)
TOTAL CK: 128 U/L (ref 0–170)
TOTAL PROTEIN: 7.6 G/DL (ref 6.3–8)
TROPONIN: <0.01 NG/ML (ref 0–0.01)
URINE REFLEX TO CULTURE: YES
UROBILINOGEN, URINE: 0.2 E.U./DL
WBC # BLD: 5.8 K/UL (ref 4.8–10.8)
WBC UA: ABNORMAL /HPF (ref 0–5)

## 2019-05-03 PROCEDURE — 96374 THER/PROPH/DIAG INJ IV PUSH: CPT

## 2019-05-03 PROCEDURE — 70450 CT HEAD/BRAIN W/O DYE: CPT

## 2019-05-03 PROCEDURE — 83735 ASSAY OF MAGNESIUM: CPT

## 2019-05-03 PROCEDURE — 83605 ASSAY OF LACTIC ACID: CPT

## 2019-05-03 PROCEDURE — 85025 COMPLETE CBC W/AUTO DIFF WBC: CPT

## 2019-05-03 PROCEDURE — 99285 EMERGENCY DEPT VISIT HI MDM: CPT

## 2019-05-03 PROCEDURE — 80053 COMPREHEN METABOLIC PANEL: CPT

## 2019-05-03 PROCEDURE — 87086 URINE CULTURE/COLONY COUNT: CPT

## 2019-05-03 PROCEDURE — 83690 ASSAY OF LIPASE: CPT

## 2019-05-03 PROCEDURE — 82550 ASSAY OF CK (CPK): CPT

## 2019-05-03 PROCEDURE — 2500000003 HC RX 250 WO HCPCS: Performed by: PHYSICIAN ASSISTANT

## 2019-05-03 PROCEDURE — 2580000003 HC RX 258: Performed by: PHYSICIAN ASSISTANT

## 2019-05-03 PROCEDURE — 36415 COLL VENOUS BLD VENIPUNCTURE: CPT

## 2019-05-03 PROCEDURE — 71045 X-RAY EXAM CHEST 1 VIEW: CPT

## 2019-05-03 PROCEDURE — 6360000002 HC RX W HCPCS: Performed by: PHYSICIAN ASSISTANT

## 2019-05-03 PROCEDURE — 84484 ASSAY OF TROPONIN QUANT: CPT

## 2019-05-03 PROCEDURE — 81001 URINALYSIS AUTO W/SCOPE: CPT

## 2019-05-03 PROCEDURE — 96375 TX/PRO/DX INJ NEW DRUG ADDON: CPT

## 2019-05-03 PROCEDURE — 93005 ELECTROCARDIOGRAM TRACING: CPT

## 2019-05-03 RX ORDER — ONDANSETRON 4 MG/1
4 TABLET, ORALLY DISINTEGRATING ORAL EVERY 8 HOURS PRN
Qty: 9 TABLET | Refills: 0 | Status: SHIPPED | OUTPATIENT
Start: 2019-05-03

## 2019-05-03 RX ORDER — 0.9 % SODIUM CHLORIDE 0.9 %
1000 INTRAVENOUS SOLUTION INTRAVENOUS ONCE
Status: COMPLETED | OUTPATIENT
Start: 2019-05-03 | End: 2019-05-03

## 2019-05-03 RX ORDER — FAMOTIDINE 20 MG/1
20 TABLET, FILM COATED ORAL 2 TIMES DAILY
Qty: 10 TABLET | Refills: 0 | Status: SHIPPED | OUTPATIENT
Start: 2019-05-03

## 2019-05-03 RX ORDER — ONDANSETRON 2 MG/ML
4 INJECTION INTRAMUSCULAR; INTRAVENOUS ONCE
Status: COMPLETED | OUTPATIENT
Start: 2019-05-03 | End: 2019-05-03

## 2019-05-03 RX ADMIN — ONDANSETRON 4 MG: 2 INJECTION INTRAMUSCULAR; INTRAVENOUS at 19:54

## 2019-05-03 RX ADMIN — FAMOTIDINE 20 MG: 10 INJECTION, SOLUTION INTRAVENOUS at 19:56

## 2019-05-03 RX ADMIN — SODIUM CHLORIDE 1000 ML: 9 INJECTION, SOLUTION INTRAVENOUS at 19:54

## 2019-05-03 ASSESSMENT — ENCOUNTER SYMPTOMS
APNEA: 0
SHORTNESS OF BREATH: 0
COUGH: 0
ABDOMINAL PAIN: 1
VOMITING: 1
COLOR CHANGE: 0
DIARRHEA: 0
TROUBLE SWALLOWING: 0
EYE PAIN: 0
ALLERGIC/IMMUNOLOGIC NEGATIVE: 1
NAUSEA: 1

## 2019-05-03 NOTE — ED TRIAGE NOTES
Pt c/o dizziness earlier today, N&vx1 day, headache and stomach discomfort, pt a&ox2-3 on arrival, skin w/d/[ink, pulses palp, speech clear, msp's  intact, 0 sob, 0 pain at this time, denies dizziness on arrival, 0 N&v noted or reported, sharifa angulo at bedside. Pt c/o nasal sinus pressure x 1 day.

## 2019-05-04 NOTE — ED NOTES
Pt stable, 0 c/o, 0 distress, per sharifa angulo ok to d/c pt, adele skaggs aware of bp, 0 orders. Pt going home with son, 0 problems.      Paige Chapin, RN  05/03/19 5574

## 2019-05-04 NOTE — ED NOTES
Pt up to ambulate with walker per orders, pt ol. Well, steady gait noted, pt stable, a&ox2-3, skin w/d/pink, pulses palp, msp's intact. Will monitor.      Abdoul Alarcon RN  05/03/19 3612

## 2019-05-04 NOTE — ED NOTES
Spoke with CT tech, pt ok to go but staff must stay with him.  Paged for transport to [address], RN  05/03/19 2050

## 2019-05-04 NOTE — ED PROVIDER NOTES
reviewed and negative. PAST MEDICAL HISTORY     Past Medical History:   Diagnosis Date    CAD (coronary artery disease)     Chronic anemia     [place] (hard of hearing)     Hyperlipidemia     Hypertension     Type II or unspecified type diabetes mellitus without mention of complication, not stated as uncontrolled          SURGICALHISTORY       Past Surgical History:   Procedure Laterality Date    BLADDER SUSPENSION      CARDIAC CATHETERIZATION      CATARACT REMOVAL      CHOLECYSTECTOMY  1990    COLONOSCOPY  1-07    CORONARY ANGIOPLASTY WITH STENT PLACEMENT      CORONARY ANGIOPLASTY WITH STENT PLACEMENT  5-08    FOOT SURGERY      L    HYSTERECTOMY  1964         CURRENT MEDICATIONS       Previous Medications    CLOPIDOGREL (PLAVIX) 75 MG TABLET    TAKE ONE TABLET BY MOUTH EVERY DAY    DONEPEZIL (ARICEPT) 5 MG TABLET    TAKE ONE TABLET BY MOUTH nightly    GLUCOSE BLOOD (BLOOD GLUCOSE TEST STRIPS) STRP    Test bid--dx 250.00/E11.8    LANCETS MISC    Test bid--dx 250.00/E11.8    METFORMIN (GLUCOPHAGE) 500 MG TABLET    TAKE ONE TABLET BY MOUTH EVERY DAY    NITROGLYCERIN (NITROSTAT) 0.4 MG SL TABLET    DISSOLVE ONE TABLET UNDER TONGUE EVERY 5 MINUTES UP TO 3 TIMES AS NEEDED FOR CHEST PAIN, AS DIRECTED        ALLERGIES     Patient has no known allergies. FAMILY HISTORY       Family History   Problem Relation Age of Onset    Heart Disease Mother     Diabetes Mother     Cancer Father         LUNG          SOCIAL HISTORY       Social History     Socioeconomic History    Marital status:       Spouse name: None    Number of children: None    Years of education: None    Highest education level: None   Occupational History    None   Social Needs    Financial resource strain: None    Food insecurity:     Worry: None     Inability: None    Transportation needs:     Medical: None     Non-medical: None   Tobacco Use    Smoking status: Never Smoker    Smokeless tobacco: Never Used   Substance and Sexual Activity    Alcohol use: No    Drug use: No    Sexual activity: None   Lifestyle    Physical activity:     Days per week: None     Minutes per session: None    Stress: None   Relationships    Social connections:     Talks on phone: None     Gets together: None     Attends Tenriism service: None     Active member of club or organization: None     Attends meetings of clubs or organizations: None     Relationship status: None    Intimate partner violence:     Fear of current or ex partner: None     Emotionally abused: None     Physically abused: None     Forced sexual activity: None   Other Topics Concern    None   Social History Narrative    None       SCREENINGS              PHYSICAL EXAM    (up to 7 for level 4, 8 or more for level 5)     ED Triage Vitals   BP Temp Temp Source Pulse Resp SpO2 Height Weight   05/1934 05/03/19 1938 05/03/19 1938 05/1934 05/1934 05/1934 -- 05/1934   (!) 167/100 97.6 °F (36.4 °C) Oral 76 16 99 %  120 lb (54.4 kg)       Physical Exam   Constitutional: She is oriented to person, place, and time. She appears well-developed and well-nourished. No distress. HENT:   Head: Normocephalic and atraumatic. Mouth/Throat: No oropharyngeal exudate. Eyes: Pupils are equal, round, and reactive to light. Conjunctivae and EOM are normal. No scleral icterus. Neck: Normal range of motion. Neck supple. No tracheal deviation present. Cardiovascular: Normal rate, normal heart sounds and intact distal pulses. Pulmonary/Chest: Effort normal and breath sounds normal. No respiratory distress. Abdominal: Soft. Bowel sounds are normal. She exhibits no distension. Musculoskeletal: Normal range of motion. Neurological: She is alert and oriented to person, place, and time. No cranial nerve deficit. She exhibits normal muscle tone. Skin: Skin is warm and dry. No rash noted. She is not diaphoretic. No erythema.    Psychiatric: She has a normal mood and (36.4 °C)    TempSrc:  Oral    SpO2: 99%  100%   Weight: 120 lb (54.4 kg) 149 lb (67.6 kg)            MDM      REASSESSMENT        Patient presented emergency department with dizziness and nausea with one episode of vomiting. Patient received IV Pepcid, Zofran and IV fluids with complete resolution of symptoms. Patient no longer has dizziness and was able to ambulate without any dizziness or unsteady gait. I offer the patient admission for her symptoms and she declined. Family agrees that patient is stable to go home and they will return for any recurrence of symptoms. CONSULTS:  None    PROCEDURES:  Unless otherwise noted below, none     Procedures    FINAL IMPRESSION      1. Dizziness    2. Non-intractable vomiting with nausea, unspecified vomiting type          DISPOSITION/PLAN   DISPOSITION Decision To Discharge 05/03/2019 10:06:07 PM      PATIENT REFERREDTO:  [place]  [address] [place] [address]  [phone]    In 2 days        DISCHARGEMEDICATIONS:  New Prescriptions    FAMOTIDINE (PEPCID) 20 MG TABLET    Take 1 tablet by mouth 2 times daily    ONDANSETRON (ZOFRAN ODT) 4 MG DISINTEGRATING TABLET    Take 1 tablet by mouth every 8 hours as needed for Nausea     Controlled Substances Monitoring:     No flowsheet data found.     (Please note that portions of this note were completed with a voice recognition program.  Efforts were made to edit the dictations but occasionally words are mis-transcribed.)    Audra Reece PA-C (electronically signed)  Attending Emergency Physician          Audra Reece PA-C  05/03/19 6170

## 2019-05-05 LAB — URINE CULTURE, ROUTINE: NORMAL

## 2019-05-27 ENCOUNTER — HOSPITAL ENCOUNTER (EMERGENCY)
Age: 84
Discharge: HOME | End: 2019-05-27
Attending: EMERGENCY MEDICINE
Payer: MEDICARE

## 2019-05-27 ENCOUNTER — APPOINTMENT (OUTPATIENT)
Dept: CT IMAGING | Age: 84
End: 2019-05-27
Payer: MEDICARE

## 2019-05-27 VITALS
OXYGEN SATURATION: 98 % | TEMPERATURE: 97.8 F | WEIGHT: 119 LBS | RESPIRATION RATE: 17 BRPM | SYSTOLIC BLOOD PRESSURE: 134 MMHG | HEIGHT: 57 IN | BODY MASS INDEX: 25.67 KG/M2 | DIASTOLIC BLOOD PRESSURE: 89 MMHG | HEART RATE: 74 BPM

## 2019-05-27 DIAGNOSIS — S09.90XA INJURY OF HEAD, INITIAL ENCOUNTER: Primary | ICD-10-CM

## 2019-05-27 DIAGNOSIS — S01.81XA FACIAL LACERATION, INITIAL ENCOUNTER: ICD-10-CM
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
## 2019-05-27 DIAGNOSIS — S51.811A SKIN TEAR OF RIGHT FOREARM WITHOUT COMPLICATION, INITIAL ENCOUNTER: ICD-10-CM

## 2019-05-27 PROCEDURE — 90715 TDAP VACCINE 7 YRS/> IM: CPT | Performed by: NURSE PRACTITIONER

## 2019-05-27 PROCEDURE — 70450 CT HEAD/BRAIN W/O DYE: CPT

## 2019-05-27 PROCEDURE — 99284 EMERGENCY DEPT VISIT MOD MDM: CPT

## 2019-05-27 PROCEDURE — 6370000000 HC RX 637 (ALT 250 FOR IP): Performed by: EMERGENCY MEDICINE

## 2019-05-27 PROCEDURE — 6360000002 HC RX W HCPCS: Performed by: NURSE PRACTITIONER

## 2019-05-27 PROCEDURE — 90471 IMMUNIZATION ADMIN: CPT | Performed by: NURSE PRACTITIONER

## 2019-05-27 RX ORDER — DIAPER,BRIEF,INFANT-TODD,DISP
EACH MISCELLANEOUS ONCE
Status: COMPLETED | OUTPATIENT
Start: 2019-05-27 | End: 2019-05-27

## 2019-05-27 RX ADMIN — BACITRACIN ZINC 1 G: 500 OINTMENT TOPICAL at 22:11

## 2019-05-27 RX ADMIN — TETANUS TOXOID, REDUCED DIPHTHERIA TOXOID AND ACELLULAR PERTUSSIS VACCINE, ADSORBED 0.5 ML: 5; 2.5; 8; 8; 2.5 SUSPENSION INTRAMUSCULAR at 22:08

## 2019-05-27 ASSESSMENT — ENCOUNTER SYMPTOMS
BACK PAIN: 0
DIARRHEA: 0
ABDOMINAL PAIN: 0
COUGH: 0
SHORTNESS OF BREATH: 0
VOMITING: 0
TROUBLE SWALLOWING: 0
EYE PAIN: 0
CHEST TIGHTNESS: 0
RHINORRHEA: 0
BLOOD IN STOOL: 0
NAUSEA: 0
WHEEZING: 0

## 2019-05-27 NOTE — ED TRIAGE NOTES
Pt arrives to ED room 15 on stretcher via EMS for cc mechanical fall in which pt hit head on gravel; (-) LOC, (+) plavix, lac to R eyebrow.

## 2019-05-28 NOTE — ED PROVIDER NOTES
back pain, myalgias and neck pain. Skin: Positive for wound. Negative for pallor and rash. Allergic/Immunologic: Negative for environmental allergies. Neurological: Negative for dizziness, syncope, speech difficulty, weakness, light-headedness, numbness and headaches. Hematological: Does not bruise/bleed easily. Psychiatric/Behavioral: Negative for confusion and decreased concentration. All other systems reviewed and are negative. PAST MEDICAL HISTORY     Past Medical History:   Diagnosis Date    CAD (coronary artery disease)     Chronic anemia     [place] (hard of hearing)     Hyperlipidemia     Hypertension     Type II or unspecified type diabetes mellitus without mention of complication, not stated as uncontrolled        SURGICALHISTORY       Past Surgical History:   Procedure Laterality Date    BLADDER SUSPENSION      CARDIAC CATHETERIZATION      CATARACT REMOVAL      CHOLECYSTECTOMY  1990    COLONOSCOPY  1-07    CORONARY ANGIOPLASTY WITH STENT PLACEMENT      CORONARY ANGIOPLASTY WITH STENT PLACEMENT  5-08    FOOT SURGERY      L    HYSTERECTOMY  1964       CURRENT MEDICATIONS       Previous Medications    CLOPIDOGREL (PLAVIX) 75 MG TABLET    TAKE ONE TABLET BY MOUTH EVERY DAY    DONEPEZIL (ARICEPT) 5 MG TABLET    TAKE ONE TABLET BY MOUTH nightly    FAMOTIDINE (PEPCID) 20 MG TABLET    Take 1 tablet by mouth 2 times daily    GLUCOSE BLOOD (BLOOD GLUCOSE TEST STRIPS) STRP    Test bid--dx 250.00/E11.8    LANCETS MISC    Test bid--dx 250.00/E11.8    METFORMIN (GLUCOPHAGE) 500 MG TABLET    TAKE ONE TABLET BY MOUTH EVERY DAY    NITROGLYCERIN (NITROSTAT) 0.4 MG SL TABLET    DISSOLVE ONE TABLET UNDER TONGUE EVERY 5 MINUTES UP TO 3 TIMES AS NEEDED FOR CHEST PAIN, AS DIRECTED     ONDANSETRON (ZOFRAN ODT) 4 MG DISINTEGRATING TABLET    Take 1 tablet by mouth every 8 hours as needed for Nausea       ALLERGIES     Patient has no known allergies.     FAMILY HISTORY       Family History   Problem Relation Age of Onset    Heart Disease Mother     Diabetes Mother     Cancer Father         LUNG          SOCIAL HISTORY       Social History     Socioeconomic History    Marital status:      Spouse name: None    Number of children: None    Years of education: None    Highest education level: None   Occupational History    None   Social Needs    Financial resource strain: None    Food insecurity:     Worry: None     Inability: None    Transportation needs:     Medical: None     Non-medical: None   Tobacco Use    Smoking status: Never Smoker    Smokeless tobacco: Never Used   Substance and Sexual Activity    Alcohol use: No    Drug use: No    Sexual activity: None   Lifestyle    Physical activity:     Days per week: None     Minutes per session: None    Stress: None   Relationships    Social connections:     Talks on phone: None     Gets together: None     Attends Druze service: None     Active member of club or organization: None     Attends meetings of clubs or organizations: None     Relationship status: None    Intimate partner violence:     Fear of current or ex partner: None     Emotionally abused: None     Physically abused: None     Forced sexual activity: None   Other Topics Concern    None   Social History Narrative    None       SCREENINGS    Darrick Coma Scale  Eye Opening: Spontaneous  Best Verbal Response: Oriented(to baseline x 2)  Best Motor Response: Obeys commands  Darrick Coma Scale Score: 15 @FLOW([id number])@    PHYSICAL EXAM    (5+ for level 4, 8+ for level 5)     ED Triage Vitals [05/27/19 1926]   BP Temp Temp Source Pulse Resp SpO2 Height Weight   (!) 164/65 97.8 °F (36.6 °C) Oral 78 17 98 % 4' 9\" (1.448 m) 119 lb (54 kg)       Physical Exam   Constitutional: She is oriented to person, place, and time. She appears well-developed and well-nourished. No distress. She is very hard of hearing   HENT:   Head: Normocephalic.    Nose: Nose normal.   Mouth/Throat:

## 2020-09-16 ENCOUNTER — APPOINTMENT (OUTPATIENT)
Dept: CT IMAGING | Age: 85
End: 2020-09-16
Payer: MEDICARE

## 2020-09-16 ENCOUNTER — HOSPITAL ENCOUNTER (EMERGENCY)
Age: 85
Discharge: HOME | End: 2020-09-16
Attending: EMERGENCY MEDICINE
Payer: MEDICARE

## 2020-09-16 ENCOUNTER — APPOINTMENT (OUTPATIENT)
Dept: GENERAL RADIOLOGY | Age: 85
End: 2020-09-16
Payer: MEDICARE

## 2020-09-16 VITALS
WEIGHT: 119 LBS | OXYGEN SATURATION: 96 % | DIASTOLIC BLOOD PRESSURE: 47 MMHG | RESPIRATION RATE: 16 BRPM | TEMPERATURE: 97.7 F | HEART RATE: 88 BPM | SYSTOLIC BLOOD PRESSURE: 127 MMHG | BODY MASS INDEX: 25.67 KG/M2 | HEIGHT: 57 IN

## 2020-09-16 LAB
ALBUMIN SERPL-MCNC: 3.9 G/DL (ref 3.5–4.6)
ALP BLD-CCNC: 102 U/L (ref 40–130)
ALT SERPL-CCNC: 9 U/L (ref 0–33)
ANION GAP SERPL CALCULATED.3IONS-SCNC: 13 MEQ/L (ref 9–15)
AST SERPL-CCNC: 19 U/L (ref 0–35)
BACTERIA: ABNORMAL /HPF
BASOPHILS ABSOLUTE: 0 K/UL (ref 0–0.2)
BASOPHILS RELATIVE PERCENT: 0.5 %
BILIRUB SERPL-MCNC: <0.2 MG/DL (ref 0.2–0.7)
BILIRUBIN URINE: NEGATIVE
BLOOD, URINE: ABNORMAL
BUN BLDV-MCNC: 16 MG/DL (ref 8–23)
CALCIUM SERPL-MCNC: 9.2 MG/DL (ref 8.5–9.9)
CHLORIDE BLD-SCNC: 105 MEQ/L (ref 95–107)
CLARITY: CLEAR
CO2: 23 MEQ/L (ref 20–31)
COLOR: YELLOW
CREAT SERPL-MCNC: 0.74 MG/DL (ref 0.5–0.9)
EKG ATRIAL RATE: 91 BPM
EKG P AXIS: 57 DEGREES
EKG P-R INTERVAL: 190 MS
EKG Q-T INTERVAL: 372 MS
EKG QRS DURATION: 80 MS
EKG QTC CALCULATION (BAZETT): 457 MS
EKG R AXIS: 16 DEGREES
EKG T AXIS: 44 DEGREES
EKG VENTRICULAR RATE: 91 BPM
EOSINOPHILS ABSOLUTE: 0.9 K/UL (ref 0–0.7)
EOSINOPHILS RELATIVE PERCENT: 12.6 %
EPITHELIAL CELLS, UA: ABNORMAL /HPF (ref 0–5)
GFR AFRICAN AMERICAN: >60
GFR NON-AFRICAN AMERICAN: >60
GLOBULIN: 3.5 G/DL (ref 2.3–3.5)
GLUCOSE BLD-MCNC: 165 MG/DL (ref 70–99)
GLUCOSE URINE: 250 MG/DL
HCT VFR BLD CALC: 37.5 % (ref 37–47)
HEMOGLOBIN: 12.5 G/DL (ref 12–16)
HYALINE CASTS: ABNORMAL /HPF (ref 0–5)
INR BLD: 1
KETONES, URINE: NEGATIVE MG/DL
LEUKOCYTE ESTERASE, URINE: NEGATIVE
LYMPHOCYTES ABSOLUTE: 0.7 K/UL (ref 1–4.8)
LYMPHOCYTES RELATIVE PERCENT: 10.7 %
MCH RBC QN AUTO: 29.5 PG (ref 27–31.3)
MCHC RBC AUTO-ENTMCNC: 33.4 % (ref 33–37)
MCV RBC AUTO: 88.3 FL (ref 82–100)
MONOCYTES ABSOLUTE: 0.5 K/UL (ref 0.2–0.8)
MONOCYTES RELATIVE PERCENT: 7.1 %
NEUTROPHILS ABSOLUTE: 4.8 K/UL (ref 1.4–6.5)
NEUTROPHILS RELATIVE PERCENT: 69.1 %
NITRITE, URINE: NEGATIVE
PDW BLD-RTO: 14.3 % (ref 11.5–14.5)
PH UA: 7 (ref 5–9)
PLATELET # BLD: 242 K/UL (ref 130–400)
POTASSIUM SERPL-SCNC: 3.7 MEQ/L (ref 3.4–4.9)
PROTEIN UA: 100 MG/DL
PROTHROMBIN TIME: 12.7 SEC (ref 12.3–14.9)
RBC # BLD: 4.25 M/UL (ref 4.2–5.4)
RBC UA: ABNORMAL /HPF (ref 0–5)
SODIUM BLD-SCNC: 141 MEQ/L (ref 135–144)
SPECIFIC GRAVITY UA: 1.01 (ref 1–1.03)
TOTAL PROTEIN: 7.4 G/DL (ref 6.3–8)
TROPONIN: 0.01 NG/ML (ref 0–0.01)
TROPONIN: 0.1 NG/ML (ref 0–0.01)
URINE REFLEX TO CULTURE: ABNORMAL
UROBILINOGEN, URINE: 0.2 E.U./DL
WBC # BLD: 6.9 K/UL (ref 4.8–10.8)
WBC UA: ABNORMAL /HPF (ref 0–5)

## 2020-09-16 PROCEDURE — 85025 COMPLETE CBC W/AUTO DIFF WBC: CPT

## 2020-09-16 PROCEDURE — 12001 RPR S/N/AX/GEN/TRNK 2.5CM/<: CPT

## 2020-09-16 PROCEDURE — 96374 THER/PROPH/DIAG INJ IV PUSH: CPT

## 2020-09-16 PROCEDURE — 6370000000 HC RX 637 (ALT 250 FOR IP): Performed by: EMERGENCY MEDICINE

## 2020-09-16 PROCEDURE — 70450 CT HEAD/BRAIN W/O DYE: CPT

## 2020-09-16 PROCEDURE — 6360000002 HC RX W HCPCS: Performed by: EMERGENCY MEDICINE

## 2020-09-16 PROCEDURE — 71045 X-RAY EXAM CHEST 1 VIEW: CPT

## 2020-09-16 PROCEDURE — 93005 ELECTROCARDIOGRAM TRACING: CPT | Performed by: EMERGENCY MEDICINE

## 2020-09-16 PROCEDURE — 6830039000 HC L3 TRAUMA ALERT

## 2020-09-16 PROCEDURE — 84484 ASSAY OF TROPONIN QUANT: CPT

## 2020-09-16 PROCEDURE — 81001 URINALYSIS AUTO W/SCOPE: CPT

## 2020-09-16 PROCEDURE — 36415 COLL VENOUS BLD VENIPUNCTURE: CPT

## 2020-09-16 PROCEDURE — 80053 COMPREHEN METABOLIC PANEL: CPT

## 2020-09-16 PROCEDURE — 85610 PROTHROMBIN TIME: CPT

## 2020-09-16 PROCEDURE — 99285 EMERGENCY DEPT VISIT HI MDM: CPT

## 2020-09-16 RX ORDER — LORAZEPAM 2 MG/ML
0.5 INJECTION INTRAMUSCULAR ONCE
Status: DISCONTINUED | OUTPATIENT
Start: 2020-09-16 | End: 2020-09-16

## 2020-09-16 RX ORDER — LORAZEPAM 2 MG/ML
1 INJECTION INTRAMUSCULAR ONCE
Status: COMPLETED | OUTPATIENT
Start: 2020-09-16 | End: 2020-09-16

## 2020-09-16 RX ADMIN — Medication 1 EACH: at 20:49

## 2020-09-16 RX ADMIN — LORAZEPAM 1 MG: 2 INJECTION INTRAMUSCULAR; INTRAVENOUS at 21:47

## 2020-09-16 ASSESSMENT — ENCOUNTER SYMPTOMS
DIARRHEA: 0
WHEEZING: 0
SORE THROAT: 0
NAUSEA: 0
EYE DISCHARGE: 0
ABDOMINAL PAIN: 0
PHOTOPHOBIA: 0
VOMITING: 0
ABDOMINAL DISTENTION: 0
SHORTNESS OF BREATH: 0
CHEST TIGHTNESS: 0
COUGH: 0

## 2020-09-16 NOTE — ED TRIAGE NOTES
PT arrives via EMS from home for mid sternal CP starting approximately 20 minutes ago. Pt then fell from standing and hit the right side of her head. No thinners and no LOC. Pt with hx of dementia and currently alert and oriented x3. Pt stating \"I am not going to stay. \"

## 2020-09-16 NOTE — ED NOTES
Bed: 10  Expected date: 9/16/20  Expected time:   Means of arrival:   Comments:  Fall from standing 1 inch lac to head. Chest pain as well.   150/79, 118, 18, 98% cecelia Mistry RN  09/16/20 6168

## 2020-09-16 NOTE — ED PROVIDER NOTES
[address] [facility] ED  eMERGENCY dEPARTMENT eNCOUnter      Pt Name: Prabhu Eubanks  MRN: [id number]  Carolgfarturo 2/10/1928  Date of evaluation: 9/16/2020  Provider: Darryle Eis, MD    CHIEF COMPLAINT       Chief Complaint   Patient presents with   Dwight Razo    Chest Pain         HISTORY OF PRESENT ILLNESS   (Location/Symptom, Timing/Onset,Context/Setting, Quality, Duration, Modifying Factors, Severity)  Note limiting factors. Prabhu Eubanks is a 80 y.o. female who presents to the emergency department for evaluation after a fall. Patient was complaining of chest pain after a fall. Patient lives with her son and she fell in the kitchen hitting her right side of the head and sustaining abrasions to her right hand. He said her on the couch and then she started complaining of chest pain mid and right-sided. This was just prior to arrival and now has resolved. Approximately 20 minutes of pain we believe. Arnulfo Martínez He thinks she just lost her balance no loss of consciousness. She hit the right side of her head. He has baseline dementia. Currently denies chest pain. She is not allowed reliable historian because of her dementia. She continues to say she is fine and wants to go home. She is moving all extremities. Evidence of a right parietal scalp laceration and abrasions to her fingers. She is somewhat disheveled. Looks like she has not showered in a while. HPI   NursingNotes were reviewed. REVIEW OF SYSTEMS    (2-9 systems for level 4, 10 or more for level 5)     Review of Systems   Constitutional: Negative for chills and diaphoresis. HENT: Negative for congestion, ear pain, mouth sores and sore throat. Eyes: Negative for photophobia and discharge. Respiratory: Negative for cough, chest tightness, shortness of breath and wheezing. Cardiovascular: Positive for chest pain. Negative for palpitations.    Gastrointestinal: Negative for abdominal distention, abdominal pain, diarrhea, nausea and vomiting. Endocrine: Negative for cold intolerance. Genitourinary: Negative for difficulty urinating. Musculoskeletal: Negative for arthralgias and myalgias. Skin: Negative for pallor and rash. Allergic/Immunologic: Negative for immunocompromised state. Neurological: Negative for dizziness and syncope. Hematological: Negative for adenopathy. Psychiatric/Behavioral: Negative for agitation and hallucinations. Except as noted above the remainder of the review of systems was reviewed and negative.        PAST MEDICAL HISTORY     Past Medical History:   Diagnosis Date    CAD (coronary artery disease)     Chronic anemia     [place] (hard of hearing)     Hyperlipidemia     Hypertension     Type II or unspecified type diabetes mellitus without mention of complication, not stated as uncontrolled          SURGICALHISTORY       Past Surgical History:   Procedure Laterality Date    BLADDER SUSPENSION      CARDIAC CATHETERIZATION      CATARACT REMOVAL      CHOLECYSTECTOMY  1990    COLONOSCOPY  1-07    CORONARY ANGIOPLASTY WITH STENT PLACEMENT      CORONARY ANGIOPLASTY WITH STENT PLACEMENT  5-08    FOOT SURGERY      L    HYSTERECTOMY  1964         CURRENT MEDICATIONS       Previous Medications    CLOPIDOGREL (PLAVIX) 75 MG TABLET    TAKE ONE TABLET BY MOUTH EVERY DAY    DONEPEZIL (ARICEPT) 5 MG TABLET    TAKE ONE TABLET BY MOUTH nightly    FAMOTIDINE (PEPCID) 20 MG TABLET    Take 1 tablet by mouth 2 times daily    GLUCOSE BLOOD (BLOOD GLUCOSE TEST STRIPS) STRP    Test bid--dx 250.00/E11.8    LANCETS MISC    Test bid--dx 250.00/E11.8    METFORMIN (GLUCOPHAGE) 500 MG TABLET    TAKE ONE TABLET BY MOUTH EVERY DAY    NITROGLYCERIN (NITROSTAT) 0.4 MG SL TABLET    DISSOLVE ONE TABLET UNDER TONGUE EVERY 5 MINUTES UP TO 3 TIMES AS NEEDED FOR CHEST PAIN, AS DIRECTED     ONDANSETRON (ZOFRAN ODT) 4 MG DISINTEGRATING TABLET    Take 1 tablet by mouth every 8 hours as needed for Nausea       ALLERGIES     Patient has no known allergies. FAMILY HISTORY       Family History   Problem Relation Age of Onset    Heart Disease Mother     Diabetes Mother     Cancer Father         LUNG          SOCIAL HISTORY       Social History     Socioeconomic History    Marital status:       Spouse name: Not on file    Number of children: Not on file    Years of education: Not on file    Highest education level: Not on file   Occupational History    Not on file   Social Needs    Financial resource strain: Not on file    Food insecurity     Worry: Not on file     Inability: Not on file    Transportation needs     Medical: Not on file     Non-medical: Not on file   Tobacco Use    Smoking status: Never Smoker    Smokeless tobacco: Never Used   Substance and Sexual Activity    Alcohol use: No    Drug use: No    Sexual activity: Not on file   Lifestyle    Physical activity     Days per week: Not on file     Minutes per session: Not on file    Stress: Not on file   Relationships    Social connections     Talks on phone: Not on file     Gets together: Not on file     Attends [employer] service: Not on file     Active member of club or organization: Not on file     Attends meetings of clubs or organizations: Not on file     Relationship status: Not on file    Intimate partner violence     Fear of current or ex partner: Not on file     Emotionally abused: Not on file     Physically abused: Not on file     Forced sexual activity: Not on file   Other Topics Concern    Not on file   Social History Narrative    Not on file       SCREENINGS    [place] Coma Scale  Eye Opening: Spontaneous  Best Verbal Response: Confused  Best Motor Response: Obeys commands  [place] Coma Scale Score: 14 @FLOW([id number])@      PHYSICAL EXAM    (up to 7 for level 4, 8 or more for level 5)     ED Triage Vitals [09/16/20 1905]   BP Temp Temp Source Pulse Resp SpO2 Height Weight   -- 97.7 °F (36.5 °C) Oral 93 14 99 % 4' 9\" (1.448 m) 119 lb (54 kg) Physical Exam  Vitals signs and nursing note reviewed. Constitutional:       Appearance: She is well-developed. HENT:      Head: Normocephalic. Raccoon eyes present. No Garcia's sign, right periorbital erythema or left periorbital erythema. Jaw: There is normal jaw occlusion. Nose: Nose normal.   Eyes:      Conjunctiva/sclera: Conjunctivae normal.      Pupils: Pupils are equal, round, and reactive to light. Neck:      Musculoskeletal: Normal range of motion and neck supple. Cardiovascular:      Rate and Rhythm: Normal rate and regular rhythm. Heart sounds: Normal heart sounds. Pulmonary:      Effort: Pulmonary effort is normal.      Breath sounds: Normal breath sounds. Abdominal:      General: Bowel sounds are normal.      Palpations: Abdomen is soft. Tenderness: There is no abdominal tenderness. There is no guarding. Musculoskeletal: Normal range of motion. Hands:    Skin:     General: Skin is warm and dry. Capillary Refill: Capillary refill takes less than 2 seconds. Neurological:      Mental Status: She is alert and oriented to person, place, and time. DIAGNOSTIC RESULTS     EKG: All EKG's are interpreted by the Emergency Department Physician who either signs or Co-signsthis chart in the absence of a cardiologist.  Normal sinus rhythm rate of 91. No acute ST or T wave changes. Normal axis. Normal EKG.     RADIOLOGY:   Non-plain filmimages such as CT, Ultrasound and MRI are read by the radiologist. Plain radiographic images are visualized and preliminarily interpreted by the emergency physician with the below findings:      Interpretation per the Radiologist below, if available at the time ofthis note:    [address]    (Results Pending)   XR CHEST PORTABLE    (Results Pending)         ED BEDSIDE ULTRASOUND:   Performed by ED Physician - none    LABS:  Labs Reviewed   COMPREHENSIVE METABOLIC PANEL - Abnormal; Notable for the following components:       Result Value    Glucose 165 (*)     All other components within normal limits   CBC WITH AUTO DIFFERENTIAL - Abnormal; Notable for the following components:    Lymphocytes Absolute 0.7 (*)     Eosinophils Absolute 0.9 (*)     All other components within normal limits   TROPONIN - Abnormal; Notable for the following components:    Troponin 0.013 (*)     All other components within normal limits    Narrative:     CALL  [facility] tel. [phone],  Troponin results called to and read back by Susan Zamudio, 09/16/2020 20:33, by  [address] [place] [address],[address] RT REFLEX TO CULTURE - Abnormal; Notable for the following components:    Glucose, Ur 250 (*)     Blood, Urine MODERATE (*)     Protein,  (*)     All other components within normal limits   MICROSCOPIC URINALYSIS - Abnormal; Notable for the following components:    Bacteria, UA MODERATE (*)     RBC, UA 10-20 (*)     All other components within normal limits   TROPONIN - Abnormal; Notable for the following components:    Troponin 0.103 (*)     All other components within normal limits    Narrative:     CALL  [facility]  [facility] tel. [phone],  Troponin results called to and read back by Minerva Ramos, 09/16/2020  23:29, by Nica Handley       All other labs were within normal range or not returned as of this dictation. EMERGENCY DEPARTMENT COURSE and DIFFERENTIAL DIAGNOSIS/MDM:   Vitals:    Vitals:    09/16/20 2030 09/16/20 2120 09/16/20 2230 09/16/20 2319   BP: (!) 140/66 (!) 158/70 (!) 110/48 (!) 127/47   Pulse: 90 83 92 88   Resp: 18 18 17 16   Temp:       TempSrc:       SpO2: 100% 95% 96% 96%   Weight:       Height:              MDM patient had noncardiac-like chest pain and had none here. Normal EKG here. Initial troponin was barely elevated at 0.013. Repeat troponin over 2 hours later was the same it 0.013. No elevation. She is still pain-free.   She is experiencing sundowner symptoms and she does not want to stay here at the hospital and her son wants to take her home. He is going to follow-up with a primary care physician. He is going to return here here if she has new chest pain complaints. CONSULTS:  None    PROCEDURES:  Unless otherwise noted below, none     Lac Repair    Date/Time: 9/16/2020 9:16 PM  Performed by: Buster Howard MD  Authorized by: Buster Howard MD     Consent:     Consent obtained:  Verbal    Consent given by:  Patient    Risks discussed:  Infection and pain    Alternatives discussed:  No treatment  Anesthesia (see MAR for exact dosages): Anesthesia method:  Local infiltration    Local anesthetic:  Lidocaine 2% w/o epi  Laceration details:     Location:  Scalp    Length (cm):  1.1  Repair type:     Repair type:  Simple  Pre-procedure details:     Preparation:  Patient was prepped and draped in usual sterile fashion and imaging obtained to evaluate for foreign bodies  Exploration:     Hemostasis achieved with:  Direct pressure    Wound exploration: wound explored through full range of motion and entire depth of wound probed and visualized      Contaminated: no    Treatment:     Area cleansed with:  Saline    Amount of cleaning:  Standard    Irrigation solution:  Sterile saline    Irrigation method:  Syringe    Visualized foreign bodies/material removed: no    Skin repair:     Repair method:  Tissue adhesive  Approximation:     Approximation:  Close  Post-procedure details:     Patient tolerance of procedure: Tolerated well, no immediate complications        FINAL IMPRESSION      1. Closed head injury, initial encounter    2. Fall, initial encounter    3.  Atypical chest pain          DISPOSITION/PLAN   DISPOSITION Decision To Discharge 09/16/2020 11:32:56 PM      PATIENT REFERRED TO:  Maria Teresa Holly MD  [id number] [id number]  [phone]    In 2 days        DISCHARGE MEDICATIONS:  New Prescriptions    No medications on file          (Please note that portions of this note were completed with a

## 2020-09-17 PROCEDURE — 93010 ELECTROCARDIOGRAM REPORT: CPT | Performed by: INTERNAL MEDICINE

## 2020-09-17 NOTE — ED NOTES
Pt laceration on back of head and skin tears on right hand cleansed with chlorhexidine and peroxide.       Rakel Solomon RN  09/16/20 2051

## 2021-03-04 ENCOUNTER — APPOINTMENT (OUTPATIENT)
Dept: CT IMAGING | Age: 86
DRG: 308 | End: 2021-03-04
Payer: MEDICARE

## 2021-03-04 ENCOUNTER — APPOINTMENT (OUTPATIENT)
Dept: GENERAL RADIOLOGY | Age: 86
DRG: 308 | End: 2021-03-04
Payer: MEDICARE

## 2021-03-04 ENCOUNTER — HOSPITAL ENCOUNTER (INPATIENT)
Age: 86
LOS: 9 days | Discharge: HOSPICE | DRG: 308 | End: 2021-03-13
Attending: EMERGENCY MEDICINE | Admitting: INTERNAL MEDICINE
Payer: MEDICARE

## 2021-03-04 DIAGNOSIS — I48.91 NEW ONSET A-FIB (HCC): Primary | ICD-10-CM

## 2021-03-04 DIAGNOSIS — R41.0 CONFUSION: ICD-10-CM

## 2021-03-04 DIAGNOSIS — R53.1 GENERAL WEAKNESS: ICD-10-CM

## 2021-03-04 DIAGNOSIS — R11.2 NON-INTRACTABLE VOMITING WITH NAUSEA, UNSPECIFIED VOMITING TYPE: ICD-10-CM
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
## 2021-03-04 PROBLEM — E87.1 HYPONATREMIA: Status: ACTIVE | Noted: 2021-03-04

## 2021-03-04 PROBLEM — J90 PLEURAL EFFUSION: Status: ACTIVE | Noted: 2021-03-04

## 2021-03-04 LAB
ANION GAP SERPL CALCULATED.3IONS-SCNC: 13 MEQ/L (ref 9–15)
BACTERIA: NEGATIVE /HPF
BASOPHILS ABSOLUTE: 0 K/UL (ref 0–0.2)
BASOPHILS RELATIVE PERCENT: 0.4 %
BILIRUBIN URINE: NEGATIVE
BLOOD, URINE: NEGATIVE
BUN BLDV-MCNC: 13 MG/DL (ref 8–23)
CALCIUM SERPL-MCNC: 9.2 MG/DL (ref 8.5–9.9)
CHLORIDE BLD-SCNC: 94 MEQ/L (ref 95–107)
CLARITY: CLEAR
CO2: 21 MEQ/L (ref 20–31)
COLOR: YELLOW
CREAT SERPL-MCNC: 0.85 MG/DL (ref 0.5–0.9)
EKG ATRIAL RATE: 136 BPM
EKG Q-T INTERVAL: 372 MS
EKG QRS DURATION: 78 MS
EKG QTC CALCULATION (BAZETT): 489 MS
EKG R AXIS: 30 DEGREES
EKG T AXIS: 218 DEGREES
EKG VENTRICULAR RATE: 104 BPM
EOSINOPHILS ABSOLUTE: 1.1 K/UL (ref 0–0.7)
EOSINOPHILS RELATIVE PERCENT: 18.5 %
EPITHELIAL CELLS, UA: NORMAL /HPF (ref 0–5)
GFR AFRICAN AMERICAN: >60
GFR NON-AFRICAN AMERICAN: >60
GLUCOSE BLD-MCNC: 138 MG/DL (ref 70–99)
GLUCOSE URINE: NEGATIVE MG/DL
HCT VFR BLD CALC: 32.6 % (ref 37–47)
HEMOGLOBIN: 10.7 G/DL (ref 12–16)
HYALINE CASTS: NORMAL /HPF (ref 0–5)
KETONES, URINE: 15 MG/DL
LEUKOCYTE ESTERASE, URINE: NEGATIVE
LYMPHOCYTES ABSOLUTE: 0.8 K/UL (ref 1–4.8)
LYMPHOCYTES RELATIVE PERCENT: 13.8 %
MCH RBC QN AUTO: 30.7 PG (ref 27–31.3)
MCHC RBC AUTO-ENTMCNC: 32.9 % (ref 33–37)
MCV RBC AUTO: 93.2 FL (ref 82–100)
MONOCYTES ABSOLUTE: 0.3 K/UL (ref 0.2–0.8)
MONOCYTES RELATIVE PERCENT: 5.6 %
NEUTROPHILS ABSOLUTE: 3.6 K/UL (ref 1.4–6.5)
NEUTROPHILS RELATIVE PERCENT: 61.7 %
NITRITE, URINE: NEGATIVE
PDW BLD-RTO: 15.1 % (ref 11.5–14.5)
PH UA: 5 (ref 5–9)
PLATELET # BLD: 476 K/UL (ref 130–400)
POTASSIUM SERPL-SCNC: 4.7 MEQ/L (ref 3.4–4.9)
PROTEIN UA: 30 MG/DL
RBC # BLD: 3.5 M/UL (ref 4.2–5.4)
RBC UA: NORMAL /HPF (ref 0–5)
SODIUM BLD-SCNC: 128 MEQ/L (ref 135–144)
SPECIFIC GRAVITY UA: 1.01 (ref 1–1.03)
TROPONIN: <0.01 NG/ML (ref 0–0.01)
UROBILINOGEN, URINE: 0.2 E.U./DL
WBC # BLD: 5.8 K/UL (ref 4.8–10.8)
WBC UA: NORMAL /HPF (ref 0–5)

## 2021-03-04 PROCEDURE — 74022 RADEX COMPL AQT ABD SERIES: CPT

## 2021-03-04 PROCEDURE — 2500000003 HC RX 250 WO HCPCS: Performed by: EMERGENCY MEDICINE

## 2021-03-04 PROCEDURE — 70450 CT HEAD/BRAIN W/O DYE: CPT

## 2021-03-04 PROCEDURE — 81001 URINALYSIS AUTO W/SCOPE: CPT

## 2021-03-04 PROCEDURE — 2580000003 HC RX 258: Performed by: EMERGENCY MEDICINE

## 2021-03-04 PROCEDURE — 99285 EMERGENCY DEPT VISIT HI MDM: CPT

## 2021-03-04 PROCEDURE — 96374 THER/PROPH/DIAG INJ IV PUSH: CPT

## 2021-03-04 PROCEDURE — 93005 ELECTROCARDIOGRAM TRACING: CPT | Performed by: EMERGENCY MEDICINE

## 2021-03-04 PROCEDURE — 6370000000 HC RX 637 (ALT 250 FOR IP): Performed by: EMERGENCY MEDICINE

## 2021-03-04 PROCEDURE — 36415 COLL VENOUS BLD VENIPUNCTURE: CPT

## 2021-03-04 PROCEDURE — 2060000000 HC ICU INTERMEDIATE R&B

## 2021-03-04 PROCEDURE — 80048 BASIC METABOLIC PNL TOTAL CA: CPT

## 2021-03-04 PROCEDURE — 85025 COMPLETE CBC W/AUTO DIFF WBC: CPT

## 2021-03-04 PROCEDURE — 84484 ASSAY OF TROPONIN QUANT: CPT

## 2021-03-04 RX ORDER — 0.9 % SODIUM CHLORIDE 0.9 %
500 INTRAVENOUS SOLUTION INTRAVENOUS ONCE
Status: COMPLETED | OUTPATIENT
Start: 2021-03-04 | End: 2021-03-04

## 2021-03-04 RX ORDER — ACETAMINOPHEN 650 MG/1
650 SUPPOSITORY RECTAL EVERY 6 HOURS PRN
Status: DISCONTINUED | OUTPATIENT
Start: 2021-03-04 | End: 2021-03-13 | Stop reason: HOSPADM

## 2021-03-04 RX ORDER — NICOTINE POLACRILEX 4 MG
15 LOZENGE BUCCAL PRN
Status: DISCONTINUED | OUTPATIENT
Start: 2021-03-04 | End: 2021-03-13 | Stop reason: HOSPADM

## 2021-03-04 RX ORDER — PROMETHAZINE HYDROCHLORIDE 12.5 MG/1
12.5 TABLET ORAL EVERY 6 HOURS PRN
Status: DISCONTINUED | OUTPATIENT
Start: 2021-03-04 | End: 2021-03-13 | Stop reason: HOSPADM

## 2021-03-04 RX ORDER — DEXTROSE MONOHYDRATE 25 G/50ML
12.5 INJECTION, SOLUTION INTRAVENOUS PRN
Status: DISCONTINUED | OUTPATIENT
Start: 2021-03-04 | End: 2021-03-13 | Stop reason: HOSPADM

## 2021-03-04 RX ORDER — SODIUM CHLORIDE 0.9 % (FLUSH) 0.9 %
10 SYRINGE (ML) INJECTION PRN
Status: DISCONTINUED | OUTPATIENT
Start: 2021-03-04 | End: 2021-03-13 | Stop reason: HOSPADM

## 2021-03-04 RX ORDER — ACETAMINOPHEN 325 MG/1
650 TABLET ORAL EVERY 6 HOURS PRN
Status: DISCONTINUED | OUTPATIENT
Start: 2021-03-04 | End: 2021-03-13 | Stop reason: HOSPADM

## 2021-03-04 RX ORDER — DILTIAZEM HYDROCHLORIDE 5 MG/ML
5 INJECTION INTRAVENOUS ONCE
Status: COMPLETED | OUTPATIENT
Start: 2021-03-04 | End: 2021-03-04

## 2021-03-04 RX ORDER — POLYETHYLENE GLYCOL 3350 17 G/17G
17 POWDER, FOR SOLUTION ORAL DAILY PRN
Status: DISCONTINUED | OUTPATIENT
Start: 2021-03-04 | End: 2021-03-13 | Stop reason: HOSPADM

## 2021-03-04 RX ORDER — ACETAMINOPHEN 325 MG/1
650 TABLET ORAL ONCE
Status: COMPLETED | OUTPATIENT
Start: 2021-03-04 | End: 2021-03-04

## 2021-03-04 RX ORDER — SODIUM CHLORIDE 0.9 % (FLUSH) 0.9 %
10 SYRINGE (ML) INJECTION EVERY 12 HOURS SCHEDULED
Status: DISCONTINUED | OUTPATIENT
Start: 2021-03-04 | End: 2021-03-13 | Stop reason: HOSPADM

## 2021-03-04 RX ORDER — DEXTROSE MONOHYDRATE 50 MG/ML
100 INJECTION, SOLUTION INTRAVENOUS PRN
Status: DISCONTINUED | OUTPATIENT
Start: 2021-03-04 | End: 2021-03-13 | Stop reason: HOSPADM

## 2021-03-04 RX ORDER — ONDANSETRON 2 MG/ML
4 INJECTION INTRAMUSCULAR; INTRAVENOUS EVERY 6 HOURS PRN
Status: DISCONTINUED | OUTPATIENT
Start: 2021-03-04 | End: 2021-03-13 | Stop reason: HOSPADM

## 2021-03-04 RX ADMIN — ACETAMINOPHEN 650 MG: 325 TABLET ORAL at 21:08

## 2021-03-04 RX ADMIN — SODIUM CHLORIDE 500 ML: 9 INJECTION, SOLUTION INTRAVENOUS at 17:35

## 2021-03-04 RX ADMIN — SODIUM CHLORIDE 500 ML: 9 INJECTION, SOLUTION INTRAVENOUS at 19:34

## 2021-03-04 RX ADMIN — DILTIAZEM HYDROCHLORIDE 5 MG: 5 INJECTION INTRAVENOUS at 19:34

## 2021-03-04 ASSESSMENT — ENCOUNTER SYMPTOMS
VOMITING: 0
SINUS PAIN: 0
CHEST TIGHTNESS: 0
SHORTNESS OF BREATH: 0
SORE THROAT: 0
DIARRHEA: 0
NAUSEA: 1
EYE REDNESS: 0
ABDOMINAL PAIN: 0
EYE PAIN: 0
COLOR CHANGE: 0
COUGH: 1

## 2021-03-04 ASSESSMENT — PAIN SCALES - GENERAL: PAINLEVEL_OUTOF10: 5

## 2021-03-04 ASSESSMENT — PAIN DESCRIPTION - PAIN TYPE: TYPE: ACUTE PAIN

## 2021-03-04 NOTE — ED NOTES
Pt lives with her son Severo Cruz.  Pt's daughter Sherrie Arceo called and her number is [phone]     Mandy Floyd RN  03/04/21 4920

## 2021-03-04 NOTE — ED PROVIDER NOTES
[address] [facility] ED  EMERGENCY DEPARTMENT ENCOUNTER      Pt Name: Alpa Teixeira  MRN: [id number]  Carolgfarturo 2/10/1928  Date of evaluation: 3/4/2021  Provider: Nae Caceres, [facility]       Chief Complaint   Patient presents with    Emesis     nauseated was given 4 mg of zofran and 500 ml of NS      Chief complaint: Nausea and vomiting per EMS report  History of chief complaint: This 19-year-old female with a history of dementia presents to the emergency department brought in by EMS with reported nausea and vomiting, patient was reportedly treated with normal saline 500 mL IV bolus and Zofran prior to arrival.  No other information available on the EMS field sheet and personnel no longer present in the emergency department. Patient states \"I just had a cold\" patient states they did not even ask me any questions or how I felt and just sent me here. Patient states she did have some stomach upset yesterday and nausea but denies of vomiting. Patient states her bowels have been moving normally. Patient states a little bit of a cough yesterday and this morning. Patient complaining her mouth is dry. Patient denies any chest pain palpitation short of breath or congestion denies any fevers or chills. Patient states she has some chronic back pain that comes and goes if she works too hard. Patient is a very poor historian    Further history obtained after able to reach and speak with her daughter on the phone. Daughter states she has been doing well the last few days she fell a few days ago and last night fell off the couch trying to get up again states she did not strike her head or lose consciousness but has had increased weakness. States she has not been eating well the last couple days and seems more confused states today she seemed more weak and started dry heaves so they sent her to emergency.   Daughter is not aware of any history of atrial fibrillation    Nursing Notes were reviewed. REVIEW OF SYSTEMS    (2-9 systems for level 4, 10 or more for level 5)     Review of Systems   Constitutional: Negative for chills and fever. HENT: Negative for congestion, sinus pain and sore throat. Eyes: Negative for pain and redness. Respiratory: Positive for cough. Negative for chest tightness and shortness of breath. Cardiovascular: Negative for chest pain, palpitations and leg swelling. Gastrointestinal: Positive for nausea. Negative for abdominal pain, diarrhea and vomiting. Genitourinary: Negative for difficulty urinating, dysuria, flank pain, frequency and urgency. Musculoskeletal: Negative for myalgias and neck pain. Skin: Negative for color change and rash. Neurological: Negative for weakness, numbness and headaches. Except as noted above the remainder of the review of systems was reviewed and negative.        PAST MEDICAL HISTORY     Past Medical History:   Diagnosis Date    CAD (coronary artery disease)     Chronic anemia     Dementia ([facility] [address]     [place] (hard of hearing)     Hyperlipidemia     Hypertension     Type II or unspecified type diabetes mellitus without mention of complication, not stated as uncontrolled          SURGICAL HISTORY       Past Surgical History:   Procedure Laterality Date    BLADDER SUSPENSION      CARDIAC CATHETERIZATION      CATARACT REMOVAL      CHOLECYSTECTOMY  1990    COLONOSCOPY  1-07    CORONARY ANGIOPLASTY WITH STENT PLACEMENT      CORONARY ANGIOPLASTY WITH STENT PLACEMENT  5-08    FOOT SURGERY      L    HYSTERECTOMY  1964         CURRENT MEDICATIONS       Previous Medications    CLOPIDOGREL (PLAVIX) 75 MG TABLET    TAKE ONE TABLET BY MOUTH EVERY DAY    DONEPEZIL (ARICEPT) 5 MG TABLET    TAKE ONE TABLET BY MOUTH nightly    FAMOTIDINE (PEPCID) 20 MG TABLET    Take 1 tablet by mouth 2 times daily    GLUCOSE BLOOD (BLOOD GLUCOSE TEST STRIPS) STRP    Test bid--dx 250.00/E11.8    LANCETS MISC    Test bid--dx 250.00/E11.8 METFORMIN (GLUCOPHAGE) 500 MG TABLET    TAKE ONE TABLET BY MOUTH EVERY DAY    NITROGLYCERIN (NITROSTAT) 0.4 MG SL TABLET    DISSOLVE ONE TABLET UNDER TONGUE EVERY 5 MINUTES UP TO 3 TIMES AS NEEDED FOR CHEST PAIN, AS DIRECTED     ONDANSETRON (ZOFRAN ODT) 4 MG DISINTEGRATING TABLET    Take 1 tablet by mouth every 8 hours as needed for Nausea       ALLERGIES     Patient has no known allergies. FAMILY HISTORY       Family History   Problem Relation Age of Onset    Heart Disease Mother     Diabetes Mother     Cancer Father         LUNG          SOCIAL HISTORY       Social History     Socioeconomic History    Marital status:       Spouse name: None    Number of children: None    Years of education: None    Highest education level: None   Occupational History    None   Social Needs    Financial resource strain: None    Food insecurity     Worry: None     Inability: None    Transportation needs     Medical: None     Non-medical: None   Tobacco Use    Smoking status: Never Smoker    Smokeless tobacco: Never Used   Substance and Sexual Activity    Alcohol use: No    Drug use: No    Sexual activity: None   Lifestyle    Physical activity     Days per week: None     Minutes per session: None    Stress: None   Relationships    Social connections     Talks on phone: None     Gets together: None     Attends Synagogue service: None     Active member of club or organization: None     Attends meetings of clubs or organizations: None     Relationship status: None    Intimate partner violence     Fear of current or ex partner: None     Emotionally abused: None     Physically abused: None     Forced sexual activity: None   Other Topics Concern    None   Social History Narrative    None         PHYSICAL EXAM    (up to 7 for level 4, 8 or more for level 5)     ED Triage Vitals [03/04/21 1646]   BP Temp Temp Source Pulse Resp SpO2 Height Weight   (!) 163/89 98.1 °F (36.7 °C) Oral 92 18 96 % 5' (1.524 m) 145 lb (65.8 kg)       Physical Exam   General appearance: Patient is awake alert oriented to person not place or time. Patient is answering simple questions although confused poor historian, she is extremely hard of hearing, she will follow commands appropriately.   Nontoxic, talkative, no distress  Head: Atraumatic normocephalic  Eyes pupils are equal and reactive sclera white conjunctive are pink  Oral pharyngeal cavity is pink with some decreased moisture no exudates or ulceration the airway is widely patent  Neck is supple no meningeal signs no adenopathy no JVD  Heart is regular rate and rhythm distant no gross murmurs rubs or clicks  Lungs are diminished bilaterally but clear to auscultation no wheezes rales or rhonchi no respiratory distress  Chest wall is nontender chest rise is symmetric  Abdomen is soft nontender to palpation no Curtis sign or McBurney's point tenderness no gross distention there are good bowel sounds there is no rebound rigidity or guarding no palpable masses or fullness femoral pulses are full and symmetric  Back is nontender no costovertebral angle tenderness no ecchymosis or abrasion  Lower extremities reveal no edema or calf tenderness no asymmetry straight leg raise test is negative motor sensory pulses are intact distally  Neurologic: Patient is awake alert oriented to person only not place or time, hard of hearing, speech is clear and fluent, pupils are equal and reactive extraocular muscles are intact facial symmetry is intact tongue is midline strength testing is symmetric at 4+ out of 5 bilaterally in the upper and lower extremities sensation is grossly intact in all 4 extremities    DIAGNOSTIC RESULTS     EKG: All EKG's are interpreted by the Emergency Department Physician who either signs or Co-signs this chart in the absence of a cardiologist.    EKG interpreted by ED physician indication nausea vomiting: Atrial fibrillation with rapid ventricular rate at 104 with diffuse T wave flattening/inversion new when compared to previous    RADIOLOGY:   Non-plain film images such as CT, Ultrasound and MRI are read by the radiologist. Plain radiographic images are visualized and preliminarily interpreted by the emergency physician with the below findings:    Abdominal x-ray with 1 view of the chest interpreted by ED physician indication nausea vomiting: Heart mediastinum are within normal limits there is a moderate sized right pleural effusion with chronic lung changes no acute consolidations there is nonspecific bowel gas pattern official radiology report is pending    Interpretation per the Radiologist below, if available at the time of this note:    CT Head WO Contrast   Final Result   Impression:      Study limited secondary to motion artifact. No acute findings. Mild cerebral atrophy. Chronic ischemic white matter disease. All CT scans at this facility use dose modulation, iterative reconstruction, and/or weight based dosing when appropriate to reduce radiation dose to as low as reasonably achievable. XR ACUTE ABD SERIES CHEST 1 VW    (Results Pending)     LABS:  Labs Reviewed   BASIC METABOLIC PANEL - Abnormal; Notable for the following components:       Result Value    Sodium 128 (*)     Chloride 94 (*)     Glucose 138 (*)     All other components within normal limits   CBC WITH AUTO DIFFERENTIAL - Abnormal; Notable for the following components:    RBC 3.50 (*)     Hemoglobin 10.7 (*)     Hematocrit 32.6 (*)     MCHC 32.9 (*)     RDW 15.1 (*)     Platelets 839 (*)     Lymphocytes Absolute 0.8 (*)     Eosinophils Absolute 1.1 (*)     All other components within normal limits   URINALYSIS - Abnormal; Notable for the following components:    Ketones, Urine 15 (*)     Protein, UA 30 (*)     All other components within normal limits   TROPONIN   MICROSCOPIC URINALYSIS       All other labs were within normal range or not returned as of this dictation.     EMERGENCY DEPARTMENT COURSE and DIFFERENTIAL DIAGNOSIS/MDM:   Vitals:    Vitals:    03/04/21 1730 03/04/21 1745 03/04/21 1830 03/04/21 1842   BP: (!) 127/107  125/89 125/89   Pulse:   89 89   Resp:    18   Temp:       TempSrc:       SpO2: 98% 99%  100%   Weight:       Height:         Treatment and course:  Patient was placed on a cardiac monitor with continuous pulse ox monitoring and IV was established normal saline 500 mL IV bolus was given, Cardizem 5 mg IV was given with mild persisting rapid ventricular rate with mild hypertension which did improve following treatment. Patient confused throughout the emergency department course intermittently sitting up at bedside and calling out no distress known underlying dementia. Coordination of CARE: A call was placed out to the hospitalist to arrange admission for further evaluation and care      FINAL IMPRESSION      1. New onset a-fib ([address]    2. General weakness    3. Non-intractable vomiting with nausea, unspecified vomiting type    4. Confusion          DISPOSITION/PLAN   DISPOSITION Decision To Admit 03/04/2021 07:31:01 PM  Patient awaiting official acceptance and bed placement in stable condition    PATIENT REFERRED TO:  No follow-up provider specified. DISCHARGE MEDICATIONS:  New Prescriptions    No medications on file     Controlled Substances Monitoring:     No flowsheet data found.     (Please note that portions of this note were completed with a voice recognition program.  Efforts were made to edit the dictations but occasionally words are mis-transcribed.)    Heri Tejeda DO (electronically signed)  Attending Emergency Physician            Heri Tejeda DO  03/04/21 1940

## 2021-03-04 NOTE — ED NOTES
Bed: 27  Expected date: 3/4/21  Expected time: 4:41 PM  Means of arrival: [facility]  Comments:  80 F - n/v, sob. 115/90,103,16,96%.  18G IV - Zofran given     Rachel Hagen RN  03/04/21 9366

## 2021-03-05 LAB
ALBUMIN SERPL-MCNC: 3.9 G/DL (ref 3.5–4.6)
ALP BLD-CCNC: 122 U/L (ref 40–130)
ALT SERPL-CCNC: 13 U/L (ref 0–33)
ANION GAP SERPL CALCULATED.3IONS-SCNC: 16 MEQ/L (ref 9–15)
AST SERPL-CCNC: 24 U/L (ref 0–35)
BASOPHILS ABSOLUTE: 0 K/UL (ref 0–0.2)
BASOPHILS RELATIVE PERCENT: 0.5 %
BILIRUB SERPL-MCNC: 0.8 MG/DL (ref 0.2–0.7)
BUN BLDV-MCNC: 10 MG/DL (ref 8–23)
CALCIUM SERPL-MCNC: 9.3 MG/DL (ref 8.5–9.9)
CHLORIDE BLD-SCNC: 99 MEQ/L (ref 95–107)
CO2: 20 MEQ/L (ref 20–31)
CREAT SERPL-MCNC: 0.63 MG/DL (ref 0.5–0.9)
EOSINOPHILS ABSOLUTE: 0.8 K/UL (ref 0–0.7)
EOSINOPHILS RELATIVE PERCENT: 8.6 %
GFR AFRICAN AMERICAN: >60
GFR NON-AFRICAN AMERICAN: >60
GLOBULIN: 3.9 G/DL (ref 2.3–3.5)
GLUCOSE BLD-MCNC: 127 MG/DL (ref 60–115)
GLUCOSE BLD-MCNC: 134 MG/DL (ref 70–99)
GLUCOSE BLD-MCNC: 140 MG/DL (ref 60–115)
GLUCOSE BLD-MCNC: 143 MG/DL (ref 60–115)
HBA1C MFR BLD: 6.2 % (ref 4.8–5.9)
HCT VFR BLD CALC: 34.1 % (ref 37–47)
HEMOGLOBIN: 11.6 G/DL (ref 12–16)
INR BLD: 1
LV EF: 68 %
LVEF MODALITY: NORMAL
LYMPHOCYTES ABSOLUTE: 0.7 K/UL (ref 1–4.8)
LYMPHOCYTES RELATIVE PERCENT: 8.3 %
MCH RBC QN AUTO: 31.2 PG (ref 27–31.3)
MCHC RBC AUTO-ENTMCNC: 34 % (ref 33–37)
MCV RBC AUTO: 91.8 FL (ref 82–100)
MONOCYTES ABSOLUTE: 0.6 K/UL (ref 0.2–0.8)
MONOCYTES RELATIVE PERCENT: 7.4 %
NEUTROPHILS ABSOLUTE: 6.5 K/UL (ref 1.4–6.5)
NEUTROPHILS RELATIVE PERCENT: 75.2 %
PDW BLD-RTO: 14.9 % (ref 11.5–14.5)
PERFORMED ON: ABNORMAL
PLATELET # BLD: 526 K/UL (ref 130–400)
POTASSIUM REFLEX MAGNESIUM: 3.7 MEQ/L (ref 3.4–4.9)
PROCALCITONIN: 2.85 NG/ML (ref 0–0.15)
PROTHROMBIN TIME: 13.1 SEC (ref 12.3–14.9)
RBC # BLD: 3.71 M/UL (ref 4.2–5.4)
SODIUM BLD-SCNC: 135 MEQ/L (ref 135–144)
TOTAL PROTEIN: 7.8 G/DL (ref 6.3–8)
TSH REFLEX: 1.37 UIU/ML (ref 0.44–3.86)
WBC # BLD: 8.7 K/UL (ref 4.8–10.8)

## 2021-03-05 PROCEDURE — 85610 PROTHROMBIN TIME: CPT

## 2021-03-05 PROCEDURE — 6370000000 HC RX 637 (ALT 250 FOR IP): Performed by: INTERNAL MEDICINE

## 2021-03-05 PROCEDURE — 93010 ELECTROCARDIOGRAM REPORT: CPT | Performed by: INTERNAL MEDICINE

## 2021-03-05 PROCEDURE — 6370000000 HC RX 637 (ALT 250 FOR IP): Performed by: NURSE PRACTITIONER

## 2021-03-05 PROCEDURE — 83036 HEMOGLOBIN GLYCOSYLATED A1C: CPT

## 2021-03-05 PROCEDURE — 2580000003 HC RX 258: Performed by: NURSE PRACTITIONER

## 2021-03-05 PROCEDURE — 2060000000 HC ICU INTERMEDIATE R&B

## 2021-03-05 PROCEDURE — 93005 ELECTROCARDIOGRAM TRACING: CPT | Performed by: NURSE PRACTITIONER

## 2021-03-05 PROCEDURE — 85025 COMPLETE CBC W/AUTO DIFF WBC: CPT

## 2021-03-05 PROCEDURE — 36415 COLL VENOUS BLD VENIPUNCTURE: CPT

## 2021-03-05 PROCEDURE — 99223 1ST HOSP IP/OBS HIGH 75: CPT | Performed by: INTERNAL MEDICINE

## 2021-03-05 PROCEDURE — 93306 TTE W/DOPPLER COMPLETE: CPT

## 2021-03-05 PROCEDURE — 84145 PROCALCITONIN (PCT): CPT

## 2021-03-05 PROCEDURE — 84443 ASSAY THYROID STIM HORMONE: CPT

## 2021-03-05 PROCEDURE — 97167 OT EVAL HIGH COMPLEX 60 MIN: CPT | Performed by: OCCUPATIONAL THERAPIST

## 2021-03-05 PROCEDURE — 80053 COMPREHEN METABOLIC PANEL: CPT

## 2021-03-05 RX ORDER — MIRTAZAPINE 15 MG/1
7.5 TABLET, FILM COATED ORAL NIGHTLY
Status: DISCONTINUED | OUTPATIENT
Start: 2021-03-05 | End: 2021-03-13 | Stop reason: HOSPADM

## 2021-03-05 RX ORDER — HALOPERIDOL 5 MG/ML
1 INJECTION INTRAMUSCULAR EVERY 6 HOURS PRN
Status: DISCONTINUED | OUTPATIENT
Start: 2021-03-05 | End: 2021-03-13 | Stop reason: HOSPADM

## 2021-03-05 RX ADMIN — ACETAMINOPHEN 650 MG: 325 TABLET ORAL at 22:07

## 2021-03-05 RX ADMIN — Medication 10 ML: at 22:08

## 2021-03-05 ASSESSMENT — ENCOUNTER SYMPTOMS
NAUSEA: 0
WHEEZING: 0
GASTROINTESTINAL NEGATIVE: 1
EYES NEGATIVE: 1
SHORTNESS OF BREATH: 0
CHEST TIGHTNESS: 0
ABDOMINAL PAIN: 0
COUGH: 0
RESPIRATORY NEGATIVE: 1
STRIDOR: 0
BLOOD IN STOOL: 0

## 2021-03-05 ASSESSMENT — PAIN SCALES - GENERAL
PAINLEVEL_OUTOF10: 0
PAINLEVEL_OUTOF10: 0

## 2021-03-05 ASSESSMENT — PAIN SCALES - WONG BAKER: WONGBAKER_NUMERICALRESPONSE: 0

## 2021-03-05 NOTE — PROGRESS NOTES
[facility] OCCUPATIONAL THERAPY EVALUATION - ACUTE     NAME: Irene Merlos  : 2/10/1928 (80 y.o.)  MRN: [id number]  CODE STATUS: Full Code  Room: 80/80-01    Date of Service: 3/5/2021    Patient Diagnosis(es): New onset a-fib [facility]) [I48.91]  New onset a-fib [facility]) [I48.91]   Chief Complaint   Patient presents with    Emesis     nauseated was given 4 mg of zofran and 500 ml of NS      Patient Active Problem List    Diagnosis Date Noted    New onset a-fib ([facility] [address] 2021    Nausea & vomiting 2021    Falls 2021    Weakness 2021    Hyponatremia 2021    Pleural effusion 2021    Dementia ([facility] [address]     Type 2 diabetes mellitus with complication ([facility] [address] 531    Impaired mobility and activities of daily living 2015    Neck pain 2015    Osteoarthritis 2014    Dizziness, nonspecific 2013    Vitamin B12 deficiency 10/24/2012    Hyperlipidemia     Chronic anemia     CAD (coronary artery disease)     [place] (hard of hearing)         Past Medical History:   Diagnosis Date    CAD (coronary artery disease)     Chronic anemia     Dementia ([address]     [place] (hard of hearing)     Hyperlipidemia     Hypertension     Type II or unspecified type diabetes mellitus without mention of complication, not stated as uncontrolled      Past Surgical History:   Procedure Laterality Date    BLADDER SUSPENSION      CARDIAC CATHETERIZATION      CATARACT REMOVAL      CHOLECYSTECTOMY      COLONOSCOPY      CORONARY ANGIOPLASTY WITH STENT PLACEMENT      CORONARY ANGIOPLASTY WITH STENT PLACEMENT      FOOT SURGERY      L    HYSTERECTOMY  1964        Restrictions  Restrictions/Precautions: Fall Risk     Safety Devices: Safety Devices  Safety Devices in place: Yes  Type of devices: All fall risk precautions in place   Initially in place: Yes    Subjective  Pre Treatment Pain Screening  Pain at present: 0  Scale Used:  Faces  Intervention List: Patient able to continue with treatment    Pain Reassessment:   Pain Assessment  Patient Currently in Pain: No  Pain Assessment: Faces  Lutz-Newman Pain Rating: No hurt       Prior Level of Function:  Social/Functional History  Lives With: Son  Type of Home: House(per chart, patient lives with her son, Riki Mckinney)  Marcelo Zelaya Help From: Family  Additional Comments: lives with her son, Riki Mckinney, patient is a poor historian so baseline information is limited    OBJECTIVE:     Orientation Status:  Orientation  Overall Orientation Status: Impaired  Orientation Level: Disoriented to situation, Oriented to person, Disoriented to place, Disoriented to time    Observation:  Observation/Palpation  Observation: unable to assess secondary to safety concerns with increased confusion    Cognition Status:  Cognition  Overall Cognitive Status: Exceptions  Arousal/Alertness: Inconsistent responses to stimuli  Following Commands: Inconsistently follows commands  Attention Span: Difficulty attending to directions, Unable to maintain attention  Memory: Decreased recall of biographical Information, Decreased recall of precautions, Decreased recall of recent events, Decreased short term memory, Decreased long term memory(patient stated it was 1929 and no recall of her situation)  Safety Judgement: Decreased awareness of need for safety, Decreased awareness of need for assistance  Problem Solving: Decreased awareness of errors  Insights: Not aware of deficits  Initiation: Requires cues for all  Sequencing: Requires cues for all    Perception Status:  Perception  Overall Perceptual Status: WFL    Sensation Status:  Sensation  Overall Sensation Status: [facility]    Vision and Hearing Status:  Vision  Vision: Within Functional Limits  Hearing  Hearing: Exceptions to [facility]  Hearing Exceptions: Hard of hearing/hearing concerns     ROM:   LUE AROM (degrees)  LUE AROM : WFL  Left Hand AROM (degrees)  Left Hand AROM: WFL  RUE AROM (degrees)  RUE AROM : WFL  Right Hand AROM (degrees)  Right Hand AROM: WFL    Strength:  LUE Strength  Gross LUE Strength: WFL  L Hand General: 3/5  LUE Strength Comment: 3/5 in all planes  RUE Strength  Gross RUE Strength: WFL  R Hand General: 3/5  RUE Strength Comment: 3/5 in all planes    Coordination, Tone, Quality of Movement:    Tone RUE  RUE Tone: Normotonic  Tone LUE  LUE Tone: Normotonic  Coordination  Movements Are Fluid And Coordinated: Yes    Hand Dominance:  Hand Dominance  Hand Dominance: Right(patient frequently reached with right hand, but was unable to state)    ADL Status:  ADL  Feeding: Unable to assess(comment)(due to patient's confusion at time of evaluation, unable to assess)  Grooming: Dependent/Total  UE Bathing: Dependent/Total  LE Bathing: Dependent/Total  UE Dressing: Dependent/Total  LE Dressing: Dependent/Total  Toileting: Dependent/Total  Additional Comments: Simulated ADLs as above due to safety concerns  Toilet Transfers  Toilet Transfer: Unable to assess       Therapy key for assistance levels -   Independent = Pt. is able to perform task with no assistance but may require a device   Stand by assistance = Pt. does not perform task at an independent level but does not need physical assistance, requires verbal cues  Minimal, Moderate, Maximal Assistance = Pt. requires physical assistance (25%, 50%, 75% assist from helper) for task but is able to actively participate in task   Dependent = Pt. requires total assistance with task and is not able to actively participate with task completion     Functional Mobility:  Functional Mobility  Functional Mobility Comments: safety concerns  Transfers  Sit to stand: Unable to assess  Stand to sit: Unable to assess  Transfer Comments: unsafe to assess secondary to safety concerns due to increased confusion    Bed Mobility  Bed mobility  Bridging: Maximum assistance  Rolling to Left: Maximum assistance  Rolling to Right: Maximum assistance  Supine to Sit: Unable to assess  Sit to Supine: Unable to assess  Scooting: Maximal assistance    Seated and Standing Balance:  Balance  Sitting Balance: Unable to assess(comment)  Standing Balance: Unable to assess(comment)    Functional Endurance:  Activity Tolerance  Activity Tolerance: Patient limited by fatigue, Treatment limited secondary to agitation, Treatment limited secondary to decreased cognition    D/C Recommendations:  OT D/C RECOMMENDATIONS  REQUIRES OT FOLLOW UP: Yes       OT Education:   OT Education  OT Education: OT Role, Plan of Care  Barriers to Learning: disorientation and confusion; agitation    OT Follow Up:  OT D/C RECOMMENDATIONS  REQUIRES OT FOLLOW UP: Yes       Assessment/Discharge Disposition:  Assessment: Patient is a 79 YO female from home with family. She was [facility] to [facility] with the above deficits which affect her ability to do ADL/IADL tasks. She would benefit from continued OT to maximize her Ind and safety to return home with family.   Performance deficits / Impairments: Decreased functional mobility , Decreased ADL status, Decreased strength, Decreased safe awareness, Decreased cognition, Decreased endurance, Decreased balance  Prognosis: Fair  Decision Making: High Complexity  History: moderate medical history  Exam: 7 performance deficits  Assistance / Modification: max to dependent secondary to cognitive deficits     Six Click Score  How much help for putting on and taking off regular lower body clothing?: Total  How much help for Bathing?: Total  How much help for Toileting?: Total  How much help for putting on and taking off regular upper body clothing?: Total  How much help for taking care of personal grooming?: Total  How much help for eating meals?: Total  AM-PAC Inpatient Daily Activity Raw Score: 6  AM-PAC Inpatient ADL T-Scale Score : 17.07  ADL Inpatient CMS 0-100% Score: 100    Plan:  Plan  Times per week: 1-3 x week  Plan weeks: length of acute stay  Current Treatment Recommendations: Strengthening, Balance Training, Functional Mobility Training, Endurance Training, Cognitive Reorientation, Safety Education & Training, Patient/Caregiver Education & Training, Self-Care / ADL    Goals:   Patient will:    - Improve functional endurance to tolerate/complete 30 mins of ADL's  - Be min in UB ADLs   - Be min in LB ADLs  - Be min in ADL transfers without LOB  - Be min in toileting tasks  - Improve B UE strength and endurance to Good- in order to participate in self-care activities as projected. - Access appropriate D/C site with as few architectural barriers as possible.   - Sequence self-care tasks with min verbal cues    Patient Goal: Patient goals : to return to PLOF with family assistance    Discussed and agreed upon: Yes     Therapy Time:   OT Individual Minutes  Time In: 3584  Time Out: 1407  Minutes: 15    Eval: 15 minutes     Electronically signed by Gonzalo Zuniga OT on 3/5/2021 at 3:03 PM

## 2021-03-05 NOTE — FLOWSHEET NOTE
Patient resting quietly in bed at this time. Lap belt in place for patient's safety. Avasys in room.

## 2021-03-05 NOTE — PROGRESS NOTES
Physical Therapy Missed Treatment   Facility/Department: [facility] MED SURG [id number]/[id number]    NAME: Marian Silva    : 2/10/1928 (80 y.o.)  MRN: [id number]    Account: [de-identified]  Gender: female      PT evaluation and treatment orders received. Chart reviewed. PT evaluation attempted. Pt easily awoken, however severely confused upon waking. Pt unable to follow commands at this time. Resists facilitation by therapist. Will hold evaluation at this time pending cognitive improvement. Nursing staff notified. Will follow and attempt PT evaluation again at earliest availability.        Cadence Ruffin, PT, 21 at 3:21 PM

## 2021-03-05 NOTE — H&P
[address] MEDICINE    HISTORY AND PHYSICAL EXAM    PATIENT NAME:  Rossana Wolfe    MRN:  [id number]  SERVICE DATE:  3/4/2021   SERVICE TIME:  9:52 PM    Primary Care Physician: Genesis Liriano MD     SUBJECTIVE  CHIEF COMPLAINT:  N/V    HPI:  Rossana Wolfe is a 80 y.o., , female who  has a past medical history of CAD (coronary artery disease), Chronic anemia, Dementia ([facility] [address] [place] (hard of hearing), Hyperlipidemia, Hypertension, and Type II or unspecified type diabetes mellitus without mention of complication, not stated as uncontrolled. that is hospitalized for new onset atrial fibrillation. Recent onset of nausea and vomiting. Stomach \"upset\" a couple of days ago. According to daughter the patient has fallen twice, not eating or drinking well, patient poor historian due to dementia. Found to have new onset Afib in ED. N/V resolved with Zofran and IVF. Admitted for Afib management. PAST MEDICAL HISTORY:    Past Medical History:   Diagnosis Date    CAD (coronary artery disease)     Chronic anemia     Dementia ([facility] [address]     [place] (hard of hearing)     Hyperlipidemia     Hypertension     Type II or unspecified type diabetes mellitus without mention of complication, not stated as uncontrolled      PAST SURGICAL HISTORY:    Past Surgical History:   Procedure Laterality Date    BLADDER SUSPENSION      CARDIAC CATHETERIZATION      CATARACT REMOVAL      CHOLECYSTECTOMY  1990    COLONOSCOPY  1-07    CORONARY ANGIOPLASTY WITH STENT PLACEMENT      CORONARY ANGIOPLASTY WITH STENT PLACEMENT  5-08    FOOT SURGERY      L    HYSTERECTOMY  1964     FAMILY HISTORY:    Family History   Problem Relation Age of Onset    Heart Disease Mother     Diabetes Mother     Cancer Father         LUNG     SOCIAL HISTORY:    Social History     Socioeconomic History    Marital status:       Spouse name: Not on file    Number of children: Not on file    Years of education: Not on file    CHEST PAIN, AS DIRECTED  5/2/18   Irwin Canales MD   Glucose Blood (BLOOD GLUCOSE TEST STRIPS) STRP Test bid--dx 250.00/E11.8 9/1/16   Irwin Canales MD   Lancets MISC Test bid--dx 250.00/E11.8 9/1/16   Irwin Canales MD       ALLERGIES: Patient has no known allergies. REVIEW OF SYSTEM:   Review of Systems   Unable to perform ROS: Dementia   Respiratory: Negative for shortness of breath. Cardiovascular: Negative for chest pain. Gastrointestinal: Negative for abdominal pain. OBJECTIVE  PHYSICAL EXAM:   Physical Exam  Vitals signs and nursing note reviewed. Constitutional:       Comments:  Alert and oriented to self, Frail, elderly, confused. HENT:      Head: Normocephalic and atraumatic. Nose: Nose normal.      Mouth/Throat:      Mouth: Mucous membranes are dry. Pharynx: Oropharynx is clear. Eyes:      Conjunctiva/sclera: Conjunctivae normal.      Pupils: Pupils are equal, round, and reactive to light. Neck:      Musculoskeletal: Normal range of motion and neck supple. No muscular tenderness. Vascular: No carotid bruit. Cardiovascular:      Rate and Rhythm: Normal rate. Rhythm irregular. Pulses: Normal pulses. Heart sounds: Normal heart sounds. No murmur. Pulmonary:      Effort: Pulmonary effort is normal.      Breath sounds: Normal breath sounds. No wheezing. Abdominal:      Palpations: Abdomen is soft. Tenderness: There is no abdominal tenderness. Musculoskeletal: Normal range of motion. Right lower leg: No edema. Left lower leg: No edema. Lymphadenopathy:      Cervical: No cervical adenopathy. Skin:     General: Skin is warm and dry. Capillary Refill: Capillary refill takes less than 2 seconds. Neurological:      Mental Status: She is alert. She is disoriented.    Psychiatric:         Mood and Affect: Mood normal.         Behavior: Behavior normal.          BP (!) 161/99   Pulse 91   Temp 98.1 °F (36.7 °C) (Oral) Resp 21   Ht 5' (1.524 m)   Wt 145 lb (65.8 kg)   LMP  (LMP Unknown)   SpO2 100%   BMI 28.32 kg/m²     DATA:     Diagnostic tests reviewed for today's visit:    Most recent labs and imaging results reviewed.      LABS:    Recent Results (from the past 24 hour(s))   Basic Metabolic Panel    Collection Time: 03/04/21  5:15 PM   Result Value Ref Range    Sodium 128 (L) 135 - 144 mEq/L    Potassium 4.7 3.4 - 4.9 mEq/L    Chloride 94 (L) 95 - 107 mEq/L    CO2 21 20 - 31 mEq/L    Anion Gap 13 9 - 15 mEq/L    Glucose 138 (H) 70 - 99 mg/dL    BUN 13 8 - 23 mg/dL    CREATININE 0.85 0.50 - 0.90 mg/dL    GFR Non-African American >60.0 >60    GFR  >60.0 >60    Calcium 9.2 8.5 - 9.9 mg/dL   CBC Auto Differential    Collection Time: 03/04/21  5:15 PM   Result Value Ref Range    WBC 5.8 4.8 - 10.8 K/uL    RBC 3.50 (L) 4.20 - 5.40 M/uL    Hemoglobin 10.7 (L) 12.0 - 16.0 g/dL    Hematocrit 32.6 (L) 37.0 - 47.0 %    MCV 93.2 82.0 - 100.0 fL    MCH 30.7 27.0 - 31.3 pg    MCHC 32.9 (L) 33.0 - 37.0 %    RDW 15.1 (H) 11.5 - 14.5 %    Platelets 770 (H) 344 - 400 K/uL    Neutrophils % 61.7 %    Lymphocytes % 13.8 %    Monocytes % 5.6 %    Eosinophils % 18.5 %    Basophils % 0.4 %    Neutrophils Absolute 3.6 1.4 - 6.5 K/uL    Lymphocytes Absolute 0.8 (L) 1.0 - 4.8 K/uL    Monocytes Absolute 0.3 0.2 - 0.8 K/uL    Eosinophils Absolute 1.1 (H) 0.0 - 0.7 K/uL    Basophils Absolute 0.0 0.0 - 0.2 K/uL   Urinalysis    Collection Time: 03/04/21  5:15 PM   Result Value Ref Range    Color, UA Yellow Straw/Yellow    Clarity, UA Clear Clear    Glucose, Ur Negative Negative mg/dL    Bilirubin Urine Negative Negative    Ketones, Urine 15 (A) Negative mg/dL    Specific Gravity, UA 1.015 1.005 - 1.030    Blood, Urine Negative Negative    pH, UA 5.0 5.0 - 9.0    Protein, UA 30 (A) Negative mg/dL    Urobilinogen, Urine 0.2 <2.0 E.U./dL    Nitrite, Urine Negative Negative    Leukocyte Esterase, Urine Negative Negative   Troponin Collection Time: 03/04/21  5:15 PM   Result Value Ref Range    Troponin <0.010 0.000 - 0.010 ng/mL   Microscopic Urinalysis    Collection Time: 03/04/21  5:15 PM   Result Value Ref Range    Bacteria, UA Negative Negative /HPF    Hyaline Casts, UA 1-3 0 - 5 /HPF    WBC, UA 0-2 0 - 5 /HPF    RBC, UA 0-2 0 - 5 /HPF    Epithelial Cells, UA 3-5 0 - 5 /HPF   EKG 12 Lead - Chest Pain    Collection Time: 03/04/21  5:32 PM   Result Value Ref Range    Ventricular Rate 104 BPM    Atrial Rate 136 BPM    QRS Duration 78 ms    Q-T Interval 372 ms    QTc Calculation (Bazett) 489 ms    R Axis 30 degrees    T Axis 218 degrees       IMAGING:  Ct Head Wo Contrast    Result Date: 3/4/2021  CT Brain. Contrast medium:  without contrast.. History:  Confusion. Technical factors: CT imaging of the brain was obtained and formatted as 5 mm contiguous axial images. 2.5 mm contiguous axial images were obtained through the osseous structures. Sagittal and coronal reconstruction obtained during postprocessing. Comparison:  CT brain, September 16, 2020 MRI/MRA brain,  August 28, 2015. Findings: Study limited secondary to motion artifact. Extra-axial spaces:  Normal. Intracranial hemorrhage:  None. Ventricular system: Ventricles mildly enlarged. Sulci mildly prominent. Basal Cisterns:  Normal. Cerebral Parenchyma: Bilateral symmetric periventricular areas decreased attenuation Midline Shift:  None. Cerebellum:  Normal. Paranasal sinuses and mastoid air cells:  Normal. Visualized Orbits:  Normal.     Impression: Study limited secondary to motion artifact. No acute findings. Mild cerebral atrophy. Chronic ischemic white matter disease. All CT scans at this facility use dose modulation, iterative reconstruction, and/or weight based dosing when appropriate to reduce radiation dose to as low as reasonably achievable.       VTE Prophylaxis: low molecular weight heparin -  start    ASSESSMENT AND PLAN  Principal Problem:    New onset a-fib - rate 104, treated with cardizem in the ER, her blood pressure is stable, no significant cardiac history. Plan: admit, cardiology consult, check TSH, echo, EKG in am, cardiology to address risk/benefit of anticoagulation. Nausea & vomiting - appears that it has subsided with zofran, abdominal xray OK, hydrated in the ER. Plan: monitor, PRN Zofran    Hyponatremia - appears euvolemic, received 500 CC fluid bolus in ED, confused secondary to dementia  Plan: labs in am    Falls - two falls in the last three days with weakness reported by daughter, lives at home with son. No obvious injury from the falls. Plan: PT/OT case management for possible placement. Weakness - progressive over the last few days with two falls. Plan: PT/OT, case management for possible placement. Pleural effusion - moderate right pleural effusion, no distress, 100% on room air. Lungs clear. Plan: monitor    CAD - s/p stent, no chest pain, new onset afib, she is on Plavix. Plan: continue Plavix, cardiology to see, she appears stable. Type 2 diabetes mellitus - she is on metformin at home, last A1C 6.4 from 2019,  in ED. Plan: insulin ss, resume metformin at discharge. Dementia - on Aricept, lives with son, more confused last couple days per family.    Plan: continue Aricept,        Plan of care discussed with: patient    SIGNATURE: COLT Byrd - CNP  DATE: March 4, 2021  TIME: 9:52 PM   Greta Diamond MD  - supervising physician

## 2021-03-05 NOTE — FLOWSHEET NOTE
Patient arrived on floor from ER at 2150. Patient is confused. Avasys in room for patient's safety and bed alarm in on.

## 2021-03-05 NOTE — CARE COORDINATION
states he may contact the patient's family regarding the patient. The patient's daughter would like to see how the patient does with PT/OT prior to making a decision regarding the discharge plan. The Patient and/or patient representative: The patient's daughter, Tiffanei iRck, was provided with choice of any post-acute providers for care and equipment and agrees with discharge plan  Freedom of choice regarding possible discharge options were discussed. DCCOP was completed. The patient is a low risk of readmission (green).      Electronically signed by IRWIN Reeves Cha on 3/5/2021 at 1:07 PM

## 2021-03-05 NOTE — CONSULTS
Consults    Patient Name: Carlee Gaucher  Admit Date: 3/4/2021  4:45 PM  MR #: [id number]  : 2/10/1928    Attending Physician: Concepcion Chaudhary DO  Reason for consult: AF    History of Presenting Illness:      Carlee Gaucher is a 80 y.o. female on hospital day 1 with a history of . History Obtained From:  electronic medical record    Pleasantly confused lady admitted with weakness and frequent falls. Discovered to be in AF. She has Dementia and has been yelling out all night. She slept maybe 10 minutes lat night per staff. While asleep and calm AF rage is 90 but otherwise can reach 120-150s when she is yelling. No prior records available. Her BP when calm is stable  History:      EKG:AF  Past Medical History:   Diagnosis Date    CAD (coronary artery disease)     Chronic anemia     Dementia ([address]     [place] (hard of hearing)     Hyperlipidemia     Hypertension     Type II or unspecified type diabetes mellitus without mention of complication, not stated as uncontrolled      Past Surgical History:   Procedure Laterality Date    BLADDER SUSPENSION      CARDIAC CATHETERIZATION      CATARACT REMOVAL      CHOLECYSTECTOMY      COLONOSCOPY      CORONARY ANGIOPLASTY WITH STENT PLACEMENT      CORONARY ANGIOPLASTY WITH STENT PLACEMENT      FOOT SURGERY      L    HYSTERECTOMY  1964       Family History  Family History   Problem Relation Age of Onset    Heart Disease Mother     Diabetes Mother     Cancer Father         LUNG     [] Unable to obtain due to ventilated and/ or neurologic status    Social History     Socioeconomic History    Marital status:       Spouse name: Not on file    Number of children: Not on file    Years of education: Not on file    Highest education level: Not on file   Occupational History    Not on file   Social Needs    Financial resource strain: Not on file    Food insecurity     Worry: Not on file     Inability: Not on file    Transportation needs     Medical: Not on file     Non-medical: Not on file   Tobacco Use    Smoking status: Never Smoker    Smokeless tobacco: Never Used   Substance and Sexual Activity    Alcohol use: No    Drug use: No    Sexual activity: Not on file   Lifestyle    Physical activity     Days per week: Not on file     Minutes per session: Not on file    Stress: Not on file   Relationships    Social connections     Talks on phone: Not on file     Gets together: Not on file     Attends [employer] service: Not on file     Active member of club or organization: Not on file     Attends meetings of clubs or organizations: Not on file     Relationship status: Not on file    Intimate partner violence     Fear of current or ex partner: Not on file     Emotionally abused: Not on file     Physically abused: Not on file     Forced sexual activity: Not on file   Other Topics Concern    Not on file   Social History Narrative    Not on file      [] Unable to obtain due to ventilated and/ or neurologic status      Home Medications:      Medications Prior to Admission: ondansetron (ZOFRAN ODT) 4 MG disintegrating tablet, Take 1 tablet by mouth every 8 hours as needed for Nausea  famotidine (PEPCID) 20 MG tablet, Take 1 tablet by mouth 2 times daily  clopidogrel (PLAVIX) 75 MG tablet, TAKE ONE TABLET BY MOUTH EVERY DAY  donepezil (ARICEPT) 5 MG tablet, TAKE ONE TABLET BY MOUTH nightly  metFORMIN (GLUCOPHAGE) 500 MG tablet, TAKE ONE TABLET BY MOUTH EVERY DAY  nitroGLYCERIN (NITROSTAT) 0.4 MG SL tablet, DISSOLVE ONE TABLET UNDER TONGUE EVERY 5 MINUTES UP TO 3 TIMES AS NEEDED FOR CHEST PAIN, AS DIRECTED   Glucose Blood (BLOOD GLUCOSE TEST STRIPS) STRP, Test bid--dx 250.00/E11.8  Lancets MISC, Test bid--dx 250.00/E11.8    Current Hospital Medications:     Scheduled Meds:   sodium chloride flush  10 mL Intravenous 2 times per day    enoxaparin  40 mg Subcutaneous Daily    insulin lispro  0-12 Units Subcutaneous TID     insulin lispro  0-6 Units Subcutaneous Nightly     Continuous Infusions:   dextrose       PRN Meds:.sodium chloride flush, promethazine **OR** ondansetron, polyethylene glycol, acetaminophen **OR** acetaminophen, glucose, dextrose, glucagon (rDNA), dextrose  .  dextrose          Allergies:     No Known Allergies     Review of Systems:       Review of Systems   Constitutional: Negative. Negative for diaphoresis and fatigue. HENT: Negative. Eyes: Negative. Respiratory: Negative. Negative for cough, chest tightness, shortness of breath, wheezing and stridor. Cardiovascular: Negative. Negative for chest pain, palpitations and leg swelling. Gastrointestinal: Negative. Negative for blood in stool and nausea. Genitourinary: Negative. Musculoskeletal: Negative. Skin: Negative. Neurological: Negative. Negative for dizziness, syncope, weakness and light-headedness. Hematological: Negative. Psychiatric/Behavioral: Negative. she voices no complaints but again not entirely reliable. Objective Findings:     Vitals:BP (!) 153/47   Pulse 64   Temp 98.2 °F (36.8 °C) (Oral)   Resp 18   Ht 5' (1.524 m)   Wt 145 lb (65.8 kg)   LMP  (LMP Unknown)   SpO2 100%   BMI 28.32 kg/m²      Physical Examination:    Physical Exam   Constitutional: No distress. She appears chronically ill. HENT:   Normal cephalic and Atraumatic   Eyes: Pupils are equal, round, and reactive to light. Neck: Normal range of motion and thyroid normal. Neck supple. No JVD present. No neck adenopathy. No thyromegaly present. Cardiovascular: Normal rate, intact distal pulses and normal pulses. An irregularly irregular rhythm present. Murmur heard. Pulmonary/Chest: Effort normal and breath sounds normal. She has no wheezes. She has no rales. She exhibits no tenderness. Abdominal: Soft. Bowel sounds are normal. There is no abdominal tenderness. Musculoskeletal: Normal range of motion. General: No tenderness or edema. Neurological: She is alert and oriented to person, place, and time. Skin: Skin is warm. No cyanosis. Nails show no clubbing. Results/ Medications reviewed 3/5/2021, 7:58 AM     Laboratory, Microbiology, Pathology, Radiology, Cardiology, Medications and Transcriptions reviewed  Scheduled Meds:   sodium chloride flush  10 mL Intravenous 2 times per day    enoxaparin  40 mg Subcutaneous Daily    insulin lispro  0-12 Units Subcutaneous TID WC    insulin lispro  0-6 Units Subcutaneous Nightly     Continuous Infusions:   dextrose         Recent Labs     03/04/21  1715   WBC 5.8   HGB 10.7*   HCT 32.6*   MCV 93.2   *     Recent Labs     03/04/21  1715   *   K 4.7   CL 94*   CO2 21   BUN 13   CREATININE 0.85     No results for input(s): AST, ALT, ALB, BILIDIR, BILITOT, ALKPHOS in the last 72 hours. No results for input(s): LIPASE, AMYLASE in the last 72 hours. No results for input(s): PROT, INR in the last 72 hours. Ct Head Wo Contrast    Result Date: 3/4/2021  CT Brain. Contrast medium:  without contrast.. History:  Confusion. Technical factors: CT imaging of the brain was obtained and formatted as 5 mm contiguous axial images. 2.5 mm contiguous axial images were obtained through the osseous structures. Sagittal and coronal reconstruction obtained during postprocessing. Comparison:  CT brain, September 16, 2020 MRI/MRA brain,  August 28, 2015. Findings: Study limited secondary to motion artifact. Extra-axial spaces:  Normal. Intracranial hemorrhage:  None. Ventricular system: Ventricles mildly enlarged. Sulci mildly prominent. Basal Cisterns:  Normal. Cerebral Parenchyma: Bilateral symmetric periventricular areas decreased attenuation Midline Shift:  None. Cerebellum:  Normal. Paranasal sinuses and mastoid air cells:  Normal. Visualized Orbits:  Normal.     Impression: Study limited secondary to motion artifact. No acute findings. Mild cerebral atrophy.  Chronic ischemic white matter disease. All CT scans at this facility use dose modulation, iterative reconstruction, and/or weight based dosing when appropriate to reduce radiation dose to as low as reasonably achievable. Active Hospital Problems    Diagnosis Date Noted    New onset a-fib [facility]) [I48.91] 03/04/2021     Priority: Low    Nausea & vomiting [R11.2] 03/04/2021     Priority: [address] [W19. XXXA] 03/04/2021     Priority: Low    Weakness [R53.1] 03/04/2021     Priority: Low    Hyponatremia [E87.1] 03/04/2021     Priority: Low    Pleural effusion [J90] 03/04/2021     Priority: Low    Dementia ([facility] [address] [F03.90]      Priority: Low    Type 2 diabetes mellitus with complication ([facility] [A58.2] 12/16/2015     Priority: Low    CAD (coronary artery disease) [I25.10]      Priority: Low         Impression/Plan:   1. AF- rate control w BB. OK for Lovenox while here but do not recommend NOAC due to increased risks. 2. HTN - mostly stable  3. Dementia. 4. Review Echo      Thank you for allowing us to participate in the care of this patient. Will continue to follow. Please call if questions or concerns arise.     Electronically signed by Peña Chao MD on 3/5/2021 at 7:58 AM

## 2021-03-05 NOTE — ED NOTES
Report called. Tele pack ordered. Transportation notified. Patient updated.      Ileana Roberts RN  03/04/21 [id number]

## 2021-03-06 ENCOUNTER — APPOINTMENT (OUTPATIENT)
Dept: GENERAL RADIOLOGY | Age: 86
DRG: 308 | End: 2021-03-06
Payer: MEDICARE

## 2021-03-06 LAB
ALBUMIN SERPL-MCNC: 3.2 G/DL (ref 3.5–4.6)
ALP BLD-CCNC: 103 U/L (ref 40–130)
ALT SERPL-CCNC: 10 U/L (ref 0–33)
ANION GAP SERPL CALCULATED.3IONS-SCNC: 13 MEQ/L (ref 9–15)
AST SERPL-CCNC: 17 U/L (ref 0–35)
BASOPHILS ABSOLUTE: 0.1 K/UL (ref 0–0.2)
BASOPHILS RELATIVE PERCENT: 0.6 %
BILIRUB SERPL-MCNC: 0.5 MG/DL (ref 0.2–0.7)
BUN BLDV-MCNC: 11 MG/DL (ref 8–23)
CALCIUM SERPL-MCNC: 9.1 MG/DL (ref 8.5–9.9)
CHLORIDE BLD-SCNC: 101 MEQ/L (ref 95–107)
CO2: 22 MEQ/L (ref 20–31)
CREAT SERPL-MCNC: 0.63 MG/DL (ref 0.5–0.9)
EOSINOPHILS ABSOLUTE: 1.1 K/UL (ref 0–0.7)
EOSINOPHILS RELATIVE PERCENT: 11.5 %
GFR AFRICAN AMERICAN: >60
GFR NON-AFRICAN AMERICAN: >60
GLOBULIN: 3.3 G/DL (ref 2.3–3.5)
GLUCOSE BLD-MCNC: 119 MG/DL (ref 60–115)
GLUCOSE BLD-MCNC: 124 MG/DL (ref 70–99)
GLUCOSE BLD-MCNC: 132 MG/DL (ref 60–115)
GLUCOSE BLD-MCNC: 150 MG/DL (ref 60–115)
GLUCOSE BLD-MCNC: 173 MG/DL (ref 60–115)
HCT VFR BLD CALC: 33 % (ref 37–47)
HEMOGLOBIN: 10.8 G/DL (ref 12–16)
LYMPHOCYTES ABSOLUTE: 1 K/UL (ref 1–4.8)
LYMPHOCYTES RELATIVE PERCENT: 10.9 %
MCH RBC QN AUTO: 30.1 PG (ref 27–31.3)
MCHC RBC AUTO-ENTMCNC: 32.6 % (ref 33–37)
MCV RBC AUTO: 92.3 FL (ref 82–100)
MONOCYTES ABSOLUTE: 0.9 K/UL (ref 0.2–0.8)
MONOCYTES RELATIVE PERCENT: 9.1 %
NEUTROPHILS ABSOLUTE: 6.4 K/UL (ref 1.4–6.5)
NEUTROPHILS RELATIVE PERCENT: 67.9 %
PDW BLD-RTO: 14.7 % (ref 11.5–14.5)
PERFORMED ON: ABNORMAL
PLATELET # BLD: 492 K/UL (ref 130–400)
POTASSIUM REFLEX MAGNESIUM: 3.8 MEQ/L (ref 3.4–4.9)
RBC # BLD: 3.58 M/UL (ref 4.2–5.4)
SODIUM BLD-SCNC: 136 MEQ/L (ref 135–144)
TOTAL PROTEIN: 6.5 G/DL (ref 6.3–8)
WBC # BLD: 9.4 K/UL (ref 4.8–10.8)

## 2021-03-06 PROCEDURE — 85025 COMPLETE CBC W/AUTO DIFF WBC: CPT

## 2021-03-06 PROCEDURE — 2580000003 HC RX 258: Performed by: NURSE PRACTITIONER

## 2021-03-06 PROCEDURE — 80053 COMPREHEN METABOLIC PANEL: CPT

## 2021-03-06 PROCEDURE — 99232 SBSQ HOSP IP/OBS MODERATE 35: CPT | Performed by: INTERNAL MEDICINE

## 2021-03-06 PROCEDURE — 99223 1ST HOSP IP/OBS HIGH 75: CPT | Performed by: INTERNAL MEDICINE

## 2021-03-06 PROCEDURE — 2060000000 HC ICU INTERMEDIATE R&B

## 2021-03-06 PROCEDURE — 71045 X-RAY EXAM CHEST 1 VIEW: CPT

## 2021-03-06 PROCEDURE — 36415 COLL VENOUS BLD VENIPUNCTURE: CPT

## 2021-03-06 RX ORDER — METOPROLOL TARTRATE 5 MG/5ML
2.5 INJECTION INTRAVENOUS EVERY 6 HOURS PRN
Status: DISCONTINUED | OUTPATIENT
Start: 2021-03-06 | End: 2021-03-13 | Stop reason: HOSPADM

## 2021-03-06 RX ADMIN — Medication 10 ML: at 21:36

## 2021-03-06 ASSESSMENT — ENCOUNTER SYMPTOMS
WHEEZING: 0
NAUSEA: 0
FACIAL SWELLING: 0
ANAL BLEEDING: 0
VOICE CHANGE: 0
CHEST TIGHTNESS: 0
APNEA: 0
BLOOD IN STOOL: 0
VOMITING: 0
ABDOMINAL DISTENTION: 0
COLOR CHANGE: 0
TROUBLE SWALLOWING: 0
SHORTNESS OF BREATH: 0

## 2021-03-06 ASSESSMENT — PAIN SCALES - WONG BAKER
WONGBAKER_NUMERICALRESPONSE: 0
WONGBAKER_NUMERICALRESPONSE: 0

## 2021-03-06 ASSESSMENT — PAIN DESCRIPTION - LOCATION: LOCATION: RIB CAGE

## 2021-03-06 ASSESSMENT — PAIN DESCRIPTION - ORIENTATION: ORIENTATION: RIGHT

## 2021-03-06 ASSESSMENT — PAIN SCALES - GENERAL: PAINLEVEL_OUTOF10: 0

## 2021-03-06 NOTE — CONSULTS
Pulmonary Medicine  Consult Note      Reason for consultation: Right pleural effusion    Consulting physician: Dr. Lisbeth Lindsey:    This is a 25-year-old female with past medical history significant for coronary artery disease, chronic anemia, dementia, hypertension, hyperlipidemia, diabetes mellitus was admitted with new onset of atrial fibrillation. Recent onset of nausea and vomiting stomach upset. According to daughter she fell twice 1 2 weeks ago and other 2 days ago. She is poor historian due to dementia. She was admitted with A. fib management. Pulmonary consulted due to right pleural effusion. Currently she is on room air and her O2 saturation 96% she denies having any shortness of breath. She had multiple rib fracture on her right side with moderate-sized pleural effusion with probable compressive atelectasis. Pleural fluid could be loculated. Decubitus film is requested today. Past Medical History:        Diagnosis Date    CAD (coronary artery disease)     Chronic anemia     Dementia ([address]     [place] (hard of hearing)     Hyperlipidemia     Hypertension     Type II or unspecified type diabetes mellitus without mention of complication, not stated as uncontrolled        Past Surgical History:        Procedure Laterality Date    BLADDER SUSPENSION      CARDIAC CATHETERIZATION      CATARACT REMOVAL      CHOLECYSTECTOMY  1990    COLONOSCOPY  1-07    CORONARY ANGIOPLASTY WITH STENT PLACEMENT      CORONARY ANGIOPLASTY WITH STENT PLACEMENT  5-08    FOOT SURGERY      L    HYSTERECTOMY  1964       Social History:     reports that she has never smoked. She has never used smokeless tobacco. She reports that she does not drink alcohol or use drugs. Family History:       Problem Relation Age of Onset    Heart Disease Mother     Diabetes Mother     Cancer Father         LUNG       Allergies:  Patient has no known allergies.         MEDICATIONS during current hospitalization:    Continuous Infusions:   dextrose         Scheduled Meds:   metoprolol tartrate  25 mg Oral BID    mirtazapine  7.5 mg Oral Nightly    sodium chloride flush  10 mL Intravenous 2 times per day    insulin lispro  0-12 Units Subcutaneous TID WC    insulin lispro  0-6 Units Subcutaneous Nightly       PRN Meds:metoprolol, haloperidol lactate, sodium chloride flush, promethazine **OR** ondansetron, polyethylene glycol, acetaminophen **OR** acetaminophen, glucose, dextrose, glucagon (rDNA), dextrose    REVIEW OF SYSTEMS:  As in history of present illness  Other 14 point review of system is negative. PHYSICAL EXAM:    Vitals:  BP (!) 155/60   Pulse 95   Temp 98.1 °F (36.7 °C) (Axillary)   Resp 18   Ht 5' (1.524 m)   Wt 145 lb (65.8 kg)   LMP  (LMP Unknown)   SpO2 96%   BMI 28.32 kg/m²   General: Alert, awake, Oriented x3  .comfortable in bed, No distress. Head: Atraumatic , Normocephalic   Eyes: PERRL. No sclera icterus. No conjunctival injection. No discharge   ENT: No nasal  discharge. Pharynx clear. Neck:  Trachea midline. No thyromegaly, no JVD, No cervical adenopathy. Chest : Bilaterally symmetrical ,Normal effort,  No accessory muscle use  Lung : Diminished BS right base. Dung [place] No Rales. No wheezing. No rhonchi. Heart[de-identified] Normal  rate. Regular rhythm. No mumur ,  Rub or gallop  ABD: Non-tender. Non-distended. No masses. No organmegaly. Normal bowel sounds. No hernia. Extremities: No pitting in both lower leg , No Cyanosis ,No clubbing  Neuro: Dementia. No cranial nerve abnormality, normal reflex and sensation, no focal weakness   Skin: Warm and dry. No erythema rash on exposed extremities.         Data Review  Recent Labs     03/04/21  1715 03/05/21  0815 03/06/21  0622   WBC 5.8 8.7 9.4   HGB 10.7* 11.6* 10.8*   HCT 32.6* 34.1* 33.0*   * 526* 492*      Recent Labs     03/04/21  1715 03/05/21  0815 03/06/21  0622   * 135 136   K 4.7 3.7 3.8   CL 94* 99 101   CO2 21 20 22   BUN 13 10 11   CREATININE 0.85 0.63 0.63   GLUCOSE 138* 134* 124*       MV Settings: ABGs: No results for input(s): PHART, NDB9GWS, PO2ART, ZIT1DUL, BEART, Q5ONPQWL, FRN3YVO in the last 72 hours. O2 Device: None (Room air)  O2 Flow Rate (L/min): 0 L/min  Lab Results   Component Value Date    LACTA 1.1 05/03/2019    LACTA 1.1 12/19/2017    LACTA 2.1 08/27/2015       Radiology  Echocardiogram Complete 2d With Doppler With Color    Result Date: 3/5/2021  Transthoracic Echocardiography Report (TTE)  Demographics  Patient Name    Selene Avila Gender                Female  Patient Number  [id number]      Race                                                  Ethnicity  Visit Number    [id number]     Room Number           W180  Corporate ID                  Date of Study         03/05/2021  Accession       [id number]    Referring Physician  Number  Date of Birth   02/10/1928    Sonographer           Kavita Cunha  Age             80 year(s)    Interpreting          [facility]                                Physician             Cardiology                                                      [address] [place] [address] Procedure Type of Study  TTE procedure:ECHO COMPLETE 2D W/DOP W/COLOR. Procedure Date Date: 03/05/2021 Start: 09:56 AM Study Location: Portable Technical Quality: Adequate visualization Indications:Atrial fibrillation. Patient Status: Routine Height: 60 inches Weight: 145 pounds BSA: 1.63 m^2 BMI: 28.32 kg/m^2 BP: 153/47 mmHg Allergies   - No known allergies. Conclusions  Summary  Aortic valve leaflets are mildly thickened. Trivial aortic regurgitation is noted. Normal tricuspid valve structure and function. There is mild ( 1 +) tricuspid regurgitation with estimated RVSP of 31 mm  Hg. Mildly dilated left atrium. Normal left ventricle structure and function. Impaired relaxation compatible with diastolic dysfunction. ( reversed E/A  ratio)  Moderate concentric left ventricular hypertrophy. Signature  ----------------------------------------------------------------  Electronically signed by Maria Esther BlakelyInterpreting physician)  on 03/05/2021 01:28 PM  ----------------------------------------------------------------  Findings Left Ventricle Normal left ventricle structure and function. Impaired relaxation compatible with diastolic dysfunction. ( reversed E/A ratio) Moderate concentric left ventricular hypertrophy. Right Ventricle Normal right ventricle structure and function. Left Atrium Mildly dilated left atrium. Right Atrium Normal right atrium. Mitral Valve Mitral valve leaflets are mildly thickened with preserved leaflet mobility. Tricuspid Valve Normal tricuspid valve structure and function. There is mild ( 1 +) tricuspid regurgitation with estimated RVSP of 31 mm Hg. Aortic Valve Aortic valve leaflets are mildly thickened. Trivial aortic regurgitation is noted. Pulmonic Valve Normal pulmonic valve structure and function. Pericardial Effusion No evidence of pericardial effusion. Pleural Effusion No evidence of pleural effusion. Aorta \ Miscellaneous Miscellaneous normal findings were found. M-Mode Measurements (cm)  LVIDd: 4.19 cm                         LVIDs: 2.64 cm  IVSd: 1.07 cm                          IVSs: 1.29 cm  LVPWd: 1.01 cm                         LVPWs: 1.33 cm  Rt. Vent.  Dimension: 3.26 cm           AO Root Dimension: 3.32 cm                                         ACS: 1.48 cm                                         LA: 3.12 cm Doppler Measurements:  AV Peak Gradient: 12.22 mmHg           MV Peak E-Wave: 0.68 m/s  AV Mean Gradient: 7.6 mmHg             MV Peak A-Wave: 1.05 m/s  TR Velocity:2.67 m/s  TR Gradient:28.61 mmHg                                         Estimated RAP:3 mmHg                                         RVSP:31.61 mmHg Valves  Mitral Valve  Peak E-Wave: 0.68 m/s                 Peak A-Wave: 1.05 m/s                                        E/A Ratio: 0.65 Peak Gradient: 1.85 mmHg                                        Deceleration Time: 260.1 msec  Tissue Doppler  E' Septal Velocity: 0.06 m/s  E' Lateral Velocity: 0.05 m/s  Aortic Valve  Peak Velocity: 1.75 m/s               Mean Velocity: 1.33 m/s  Peak Gradient: 12.22 mmHg             Mean Gradient: 7.6 mmHg  AV VTI: 32.27 cm  Cusp Separation: 1.48 cm  Tricuspid Valve  Estimated RVSP: 31.61 mmHg              Estimated RAP: 3 mmHg  TR Velocity: 2.67 m/s                   TR Gradient: 28.61 mmHg  Pulmonic Valve  Peak Velocity: 1.12 m/s           Peak Gradient: 5.02 mmHg                                    Estimated PASP: 31.61 mmHg  LVOT  Peak Velocity: 0.94 m/s              Mean Velocity: 0.6 m/s  Peak Gradient: 2.88 mmHg             Mean Gradient: 1.62 mmHg                                       LVOT VTI: 17.21 cm Structures  Left Atrium  LA Dimension: 3.12 cm                        LA Area: 14.08 cm^2  LA/Aorta: 0.94  LA Volume/Index: 37.82 ml /23 m^2  Left Ventricle  Diastolic Dimension: 4.48 cm        Systolic Dimension: 7.96 cm  Septum Diastolic: 7.78 cm           Septum Systolic: 2.54 cm  PW Diastolic: 5.49 cm               PW Systolic: 4.43 cm                                      FS: 37 %  LV EDV/LV EDV Index: 78 ml/48 m^2   LV ESV/LV ESV Index: 25.5 ml/16 m^2  EF Calculated: 67.3 %               LV Length: 5.44 cm  Right Atrium  RA Systolic Pressure: 3 mmHg  Right Ventricle  Diastolic Dimension: 9.69 cm                                    RV Systolic Pressure: 48.70 mmHg Aorta/ Miscellaneous Aorta  Aortic Root: 3.32 cm    Xr Acute Abd Series Chest 1 Vw    Result Date: 3/5/2021  XR ACUTE ABD SERIES CHEST 1 VW : 3/4/2021 CLINICAL HISTORY:  nausea and vomiting . COMPARISON: Portable chest 9/16/2020 and CT abdomen pelvis 2/15/2011. TECHNIQUE: An upright PA radiograph of the chest, and upright and supine radiographs of the abdomen and pelvis were obtained.  FINDINGS: Several acute-appearing right mid to lower posterolateral rib fractures are noted, with a moderate-sized right pleural effusion and probable compressive right basilar atelectasis. There is no pneumothorax, significant left pleural effusion, cardiomegaly, or other acute fractures identified. Chronic healed deformity of the proximal right humerus, degenerative changes and mild rotary levoscoliosis of the lumbar spine appear unchanged. There is a normal bowel gas pattern. SEVERAL ACUTE RIGHT MID TO LOWER POSTEROLATERAL RIGHT RIB FRACTURES. MODERATE-SIZED RIGHT PLEURAL EFFUSION AND PROBABLE COMPRESSIVE ATELECTASIS. Ct Head Wo Contrast    Result Date: 3/4/2021  CT Brain. Contrast medium:  without contrast.. History:  Confusion. Technical factors: CT imaging of the brain was obtained and formatted as 5 mm contiguous axial images. 2.5 mm contiguous axial images were obtained through the osseous structures. Sagittal and coronal reconstruction obtained during postprocessing. Comparison:  CT brain, September 16, 2020 MRI/MRA brain,  August 28, 2015. Findings: Study limited secondary to motion artifact. Extra-axial spaces:  Normal. Intracranial hemorrhage:  None. Ventricular system: Ventricles mildly enlarged. Sulci mildly prominent. Basal Cisterns:  Normal. Cerebral Parenchyma: Bilateral symmetric periventricular areas decreased attenuation Midline Shift:  None. Cerebellum:  Normal. Paranasal sinuses and mastoid air cells:  Normal. Visualized Orbits:  Normal.     Impression: Study limited secondary to motion artifact. No acute findings. Mild cerebral atrophy. Chronic ischemic white matter disease. All CT scans at this facility use dose modulation, iterative reconstruction, and/or weight based dosing when appropriate to reduce radiation dose to as low as reasonably achievable. Assessment and  plan:     1. Right pleural effusion rule out etiology  2. History of fall with multiple right rib fracture. 3. New onset A. Fib  4. Coronary artery disease status post stent  5. Dementia    Patient has moderate right-sided pleural effusion with multiple rib fractures with history of fall x2 there is possibility of effusion could be due to rib fracture and possibility of hemomothorax or loculated effusion. Will request right and left decubitus film to see if fluid is free-flowing or not. Patient is on room air no complaint of shortness of breath. Room air saturation 96% considering dementia, she may not be appropriate at bedside thoracentesis. Will review chest x-ray decubitus film tomorrow. IR consult is already done and likely thoracentesis on Monday. We will follow    Thank you for the consult    NOTE: This report was transcribed using voice recognition software. Every effort was made to ensure accuracy; however, inadvertent computerized transcription errors may be present.     Electronically signed by Bard Tyree MD, FCCP on 3/6/2021 at 6:40 PM

## 2021-03-06 NOTE — PROGRESS NOTES
Hospitalist Daily Progress Note  Name: Cj Meraz  Age: 80 y.o. Gender: female  CodeStatus: Full Code  Allergies: No Known Allergies    Chief Complaint:Emesis (nauseated was given 4 mg of zofran and 500 ml of NS )    Primary Care Provider: Abdoul Longoria MD  InpatientTreatment Team: Treatment Team: Attending Provider: Mariano Leggett DO; Consulting Physician: James Mcintyre MD; : IRWIN Flores; Utilization Reviewer: Magaly Morataya RN; Consulting Physician: Michael Carbone MD; Registered Nurse: Yumiko Benítez, RN; : Scot Maynard; Tech: Gwenevere Poag; Utilization Reviewer: Brittney Rushing, RN; Registered Nurse: Paula Herrera, RN; Consulting Physician: Jarrod Barnett MD  Admission Date: 3/4/2021      Subjective: Patient seen and evaluated at bedside. Pt is persistently confused. pts daughter notes that this is a profund change from normal and is noted slurred speech. Oriented only to person. Physical Exam  Constitutional:       Comments: Thin and cachectic. Agitated, confused   HENT:      Head: Normocephalic and atraumatic. Mouth/Throat:      Mouth: Mucous membranes are moist.      Pharynx: Oropharynx is clear. Eyes:      Conjunctiva/sclera: Conjunctivae normal.      Pupils: Pupils are equal, round, and reactive to light. Cardiovascular:      Rate and Rhythm: Tachycardia present. Rhythm irregular. Heart sounds: No murmur. No friction rub. No gallop. Pulmonary:      Breath sounds: Rales present. No wheezing or rhonchi. Abdominal:      General: There is no distension. Tenderness: There is no abdominal tenderness. There is no guarding. Musculoskeletal: Normal range of motion. Right lower leg: No edema. Left lower leg: No edema. Neurological:      Mental Status: She is alert. Comments: Oriented only to self. Psychiatric:      Comments: Odd mood and affect, very hard of hearing, but poor insight.           Review of Systems  Reliable ROS not able to be obtained due to confusion. Medications:  Reviewed    Infusion Medications:    dextrose       Scheduled Medications:    metoprolol tartrate  25 mg Oral BID    mirtazapine  7.5 mg Oral Nightly    sodium chloride flush  10 mL Intravenous 2 times per day    insulin lispro  0-12 Units Subcutaneous TID WC    insulin lispro  0-6 Units Subcutaneous Nightly     PRN Meds: metoprolol, haloperidol lactate, sodium chloride flush, promethazine **OR** ondansetron, polyethylene glycol, acetaminophen **OR** acetaminophen, glucose, dextrose, glucagon (rDNA), dextrose    Labs:   Recent Labs     03/04/21 1715 03/05/21  0815 03/06/21  0622   WBC 5.8 8.7 9.4   HGB 10.7* 11.6* 10.8*   HCT 32.6* 34.1* 33.0*   * 526* 492*     Recent Labs     03/04/21 1715 03/05/21  0815 03/06/21  0622   * 135 136   K 4.7 3.7 3.8   CL 94* 99 101   CO2 21 20 22   BUN 13 10 11   CREATININE 0.85 0.63 0.63   CALCIUM 9.2 9.3 9.1     Recent Labs     03/05/21  0815 03/06/21  0622   AST 24 17   ALT 13 10   BILITOT 0.8* 0.5   ALKPHOS 122 103     Recent Labs     03/05/21  0815   INR 1.0     Recent Labs     03/04/21  1715   TROPONINI <0.010       Urinalysis:   Lab Results   Component Value Date    NITRU Negative 03/04/2021    WBCUA 0-2 03/04/2021    BACTERIA Negative 03/04/2021    RBCUA 0-2 03/04/2021    BLOODU Negative 03/04/2021    SPECGRAV 1.015 03/04/2021    GLUCOSEU Negative 03/04/2021       Radiology:   Most recent    Chest CT      WITH CONTRAST:No results found for this or any previous visit. WITHOUT CONTRAST: No results found for this or any previous visit. CXR      2-view:   Results for orders placed during the hospital encounter of 09/29/18   XR CHEST STANDARD (2 VW)    Narrative EXAMINATION: XR CHEST (2 VW)    CLINICAL HISTORY: SYNCOPE    COMPARISONS: AUGUST 20 17,015    FINDINGS: Osteopenia. Remote fracture healed right humeral neck. Patient leaning to right.  Cardiopericardial silhouette normal. Aorta tortuous and calcified. Lungs clear. Impression NO ACUTE CARDIOPULMONARY DISEASE        Portable:   Results for orders placed during the hospital encounter of 09/16/20   XR CHEST PORTABLE    Narrative EXAMINATION: CHEST PORTABLE VIEW     CLINICAL HISTORY: Midsternal chest pain    COMPARISONS: May 3, 2019     FINDINGS:    Single  views of the chest is submitted. The cardiac silhouette is enlarged. Pulmonary vascular attenuated  Right sided trachea. No focal infiltrates. No Pneumothoraces. Probable old healed fracture right humeral head with associated severe degenerative joint disease                                                                                    Impression NO ACUTE ACTIVE CARDIOPULMONARY PROCESS       Echo No results found for this or any previous visit. Assessment/Plan:    Active Hospital Problems    Diagnosis Date Noted    New onset a-fib ([facility] [address] [I48.91] 03/04/2021    Nausea & vomiting [R11.2] 03/04/2021    Falls [W19. XXXA] 03/04/2021    Weakness [R53.1] 03/04/2021    Hyponatremia [E87.1] 03/04/2021    Pleural effusion [J90] 03/04/2021    Dementia ([facility] [address] [F03.90]     Type 2 diabetes mellitus with complication (HCC) [C38.3] 12/16/2015    CAD (coronary artery disease) [I25.10]      Atrial Fibrillation with RVR: hold anticoagulation for possible procedure inpt. Poor candidate for chronic anticoagulation given frequent falls. Continue beta blocker. Cardiology following. Right pleural effusion: Consult to IR for thoracentesis. Consult pulmonary. Lateral cxr imaging    Dementia with confusion: daughter notes dramatic worsening compared to baseline. Ct head negative. No infectious cause. Consult to neuro per family request.     Angelo Romero with multiple rib fracture: PT/OT evaluation. se Remeron before bed, haldol 1mg IM PRN for agitation. DVT PPX held for possible thoracentesis.      Additional work up or/and treatment plan may be added today or then after based on clinical progression. I am managing a portion of pt care. Some medical issues are handled byother specialists. Additional work up and treatment should be done in out pt setting by pt PCP and other out pt providers. In addition to examining and evaluating pt, I spent additional time explaining care, normaland abnormal findings, and treatment plan. All of pt questions were answered. Counseling, diet and education were provided. Case will be discussed with nursing staff when appropriate. Family will be updated if and whenappropriate.       Electronically signed by Man Saravia DO on 3/6/2021 at 6:25 PM

## 2021-03-06 NOTE — PROGRESS NOTES
Progress Note  Patient: Rossana Wolfe  Unit/Bed: P735/D319-04  YOB: 1928  MRN: [id number]  Acct: [de-identified]   Admitting Diagnosis: New onset a-fib [facility]) [I48.91]  New onset a-fib [facility]) [I48.91]  Date:  3/4/2021  Hospital Day: 2    Chief Complaint:  A. rita    Subjective  Currently in sinus rhythm. Echo showed normal left ventricle function with borderline elevated right ventricular systolic pressure and grade 1 LV diastolic dysfunction. On Lopressor 25 twice daily. Will add as needed IV Lopressor. When she gets agitated heart rate goes up    Review of Systems:   Review of Systems   Constitutional: Negative for activity change, appetite change, diaphoresis, fatigue and unexpected weight change. HENT: Negative for facial swelling, nosebleeds, trouble swallowing and voice change. Respiratory: Negative for apnea, chest tightness, shortness of breath and wheezing. Cardiovascular: Negative for chest pain, palpitations and leg swelling. Gastrointestinal: Negative for abdominal distention, anal bleeding, blood in stool, nausea and vomiting. Genitourinary: Negative for decreased urine volume and dysuria. Musculoskeletal: Negative for gait problem and myalgias. Skin: Negative for color change, pallor, rash and wound. Neurological: Negative for dizziness, syncope, facial asymmetry, weakness, light-headedness, numbness and headaches. Hematological: Does not bruise/bleed easily. Psychiatric/Behavioral: Negative for agitation, behavioral problems, confusion, hallucinations and suicidal ideas. The patient is not nervous/anxious. All other systems reviewed and are negative. Physical Examination:    BP (!) 145/65   Pulse 95   Temp 97.8 °F (36.6 °C) (Oral)   Resp 19   Ht 5' (1.524 m)   Wt 145 lb (65.8 kg)   LMP  (LMP Unknown)   SpO2 100%   BMI 28.32 kg/m²    Physical Exam  Constitutional:       Appearance: She is well-developed. HENT:      Head: Normocephalic and atraumatic. Right Ear: External ear normal.      Left Ear: External ear normal.   Eyes:      Conjunctiva/sclera: Conjunctivae normal.      Pupils: Pupils are equal, round, and reactive to light. Neck:      Musculoskeletal: Normal range of motion and neck supple. Cardiovascular:      Rate and Rhythm: Normal rate and regular rhythm. Heart sounds: Normal heart sounds. Pulmonary:      Effort: Pulmonary effort is normal.      Breath sounds: Normal breath sounds. Abdominal:      General: Bowel sounds are normal.      Palpations: Abdomen is soft. Musculoskeletal: Normal range of motion. Skin:     General: Skin is warm and dry. Neurological:      Mental Status: She is alert and oriented to person, place, and time. Deep Tendon Reflexes: Reflexes are normal and symmetric. Psychiatric:         Behavior: Behavior normal.         Thought Content:  Thought content normal.         Judgment: Judgment normal.         LABS:  CBC:   Lab Results   Component Value Date    WBC 9.4 03/06/2021    RBC 3.58 03/06/2021    RBC 4.05 11/07/2011    HGB 10.8 03/06/2021    HCT 33.0 03/06/2021    MCV 92.3 03/06/2021    MCH 30.1 03/06/2021    MCHC 32.6 03/06/2021    RDW 14.7 03/06/2021     03/06/2021    MPV 8.4 09/01/2015     CBC with Differential:   Lab Results   Component Value Date    WBC 9.4 03/06/2021    RBC 3.58 03/06/2021    RBC 4.05 11/07/2011    HGB 10.8 03/06/2021    HCT 33.0 03/06/2021     03/06/2021    MCV 92.3 03/06/2021    MCH 30.1 03/06/2021    MCHC 32.6 03/06/2021    RDW 14.7 03/06/2021    BANDSPCT 5 03/10/2015    LYMPHOPCT 10.9 03/06/2021    MONOPCT 9.1 03/06/2021    EOSPCT 20.5 11/07/2011    BASOPCT 0.6 03/06/2021    MONOSABS 0.9 03/06/2021    LYMPHSABS 1.0 03/06/2021    EOSABS 1.1 03/06/2021    BASOSABS 0.1 03/06/2021     CMP:    Lab Results   Component Value Date     03/06/2021    K 3.8 03/06/2021     03/06/2021    CO2 22 03/06/2021    BUN 11 03/06/2021    CREATININE 0.63 03/06/2021 GFRAA >60.0 03/06/2021    LABGLOM >60.0 03/06/2021    GLUCOSE 124 03/06/2021    GLUCOSE 121 11/07/2011    PROT 6.5 03/06/2021    LABALBU 3.2 03/06/2021    LABALBU 4.2 11/07/2011    CALCIUM 9.1 03/06/2021    BILITOT 0.5 03/06/2021    ALKPHOS 103 03/06/2021    AST 17 03/06/2021    ALT 10 03/06/2021     BMP:    Lab Results   Component Value Date     03/06/2021    K 3.8 03/06/2021     03/06/2021    CO2 22 03/06/2021    BUN 11 03/06/2021    LABALBU 3.2 03/06/2021    LABALBU 4.2 11/07/2011    CREATININE 0.63 03/06/2021    CALCIUM 9.1 03/06/2021    GFRAA >60.0 03/06/2021    LABGLOM >60.0 03/06/2021    GLUCOSE 124 03/06/2021    GLUCOSE 121 11/07/2011     Magnesium:    Lab Results   Component Value Date    MG 1.9 05/03/2019     Troponin:    Lab Results   Component Value Date    TROPONINI <0.010 03/04/2021       Radiology:  Echocardiogram Complete 2d With Doppler With Color    Result Date: 3/5/2021  Transthoracic Echocardiography Report (TTE)  Demographics  Patient Name    Alex Rivera Gender                Female  Patient Number  [id number]      Race                                                  Ethnicity  Visit Number    [id number]     Room Number           W180  Corporate ID                  Date of Study         03/05/2021  Accession       [id number]    Referring Physician  Number  Date of Birth   02/10/1928    Sonographer           Olegario Chavez  Age             80 year(s)    Interpreting          [facility])                                Physician             Cardiology                                                      [address] [place] [address] Procedure Type of Study  TTE procedure:ECHO COMPLETE 2D W/DOP W/COLOR. Procedure Date Date: 03/05/2021 Start: 09:56 AM Study Location: Portable Technical Quality: Adequate visualization Indications:Atrial fibrillation. Patient Status: Routine Height: 60 inches Weight: 145 pounds BSA: 1.63 m^2 BMI: 28.32 kg/m^2 BP: 153/47 mmHg Allergies   - No known allergies. Measurements:  AV Peak Gradient: 12.22 mmHg           MV Peak E-Wave: 0.68 m/s  AV Mean Gradient: 7.6 mmHg             MV Peak A-Wave: 1.05 m/s  TR Velocity:2.67 m/s  TR Gradient:28.61 mmHg                                         Estimated RAP:3 mmHg                                         RVSP:31.61 mmHg Valves  Mitral Valve  Peak E-Wave: 0.68 m/s                 Peak A-Wave: 1.05 m/s                                        E/A Ratio: 0.65                                        Peak Gradient: 1.85 mmHg                                        Deceleration Time: 260.1 msec  Tissue Doppler  E' Septal Velocity: 0.06 m/s  E' Lateral Velocity: 0.05 m/s  Aortic Valve  Peak Velocity: 1.75 m/s               Mean Velocity: 1.33 m/s  Peak Gradient: 12.22 mmHg             Mean Gradient: 7.6 mmHg  AV VTI: 32.27 cm  Cusp Separation: 1.48 cm  Tricuspid Valve  Estimated RVSP: 31.61 mmHg              Estimated RAP: 3 mmHg  TR Velocity: 2.67 m/s                   TR Gradient: 28.61 mmHg  Pulmonic Valve  Peak Velocity: 1.12 m/s           Peak Gradient: 5.02 mmHg                                    Estimated PASP: 31.61 mmHg  LVOT  Peak Velocity: 0.94 m/s              Mean Velocity: 0.6 m/s  Peak Gradient: 2.88 mmHg             Mean Gradient: 1.62 mmHg                                       LVOT VTI: 17.21 cm Structures  Left Atrium  LA Dimension: 3.12 cm                        LA Area: 14.08 cm^2  LA/Aorta: 0.94  LA Volume/Index: 37.82 ml /23 m^2  Left Ventricle  Diastolic Dimension: 9.50 cm        Systolic Dimension: 0.76 cm  Septum Diastolic: 2.17 cm           Septum Systolic: 3.83 cm  PW Diastolic: 3.35 cm               PW Systolic: 2.86 cm                                      FS: 37 %  LV EDV/LV EDV Index: 78 ml/48 m^2   LV ESV/LV ESV Index: 25.5 ml/16 m^2  EF Calculated: 67.3 %               LV Length: 5.44 cm  Right Atrium  RA Systolic Pressure: 3 mmHg  Right Ventricle  Diastolic Dimension: 5.78 cm

## 2021-03-06 NOTE — PROGRESS NOTES
Physical Therapy   Facility/Department: [facility] MED SURG Q090/P327-14    NAME: Luan Jung    : 2/10/1928 (80 y.o.)  MRN: [id number]    Account: [de-identified]  Gender: female    PT evaluation and treatment orders received. Chart reviewed. PT eval attempted. Hold PT eval: pt unable to safely participate in mobility assessment this date d/t confusion. Will attempt PT evaluation again at earliest convenience.       Electronically signed by Toya Culp PT on 3/6/21 at 1:57 PM EST

## 2021-03-06 NOTE — FLOWSHEET NOTE
1043 pm. Restless and confused. Pt refused meds. Tylenol spit out. Refused yogurt and ensure drink.  Electronically signed by Roel Maguire RN on 3/5/2021 at 10:44 PM

## 2021-03-07 ENCOUNTER — APPOINTMENT (OUTPATIENT)
Dept: GENERAL RADIOLOGY | Age: 86
DRG: 308 | End: 2021-03-07
Payer: MEDICARE

## 2021-03-07 LAB
ALBUMIN SERPL-MCNC: 3 G/DL (ref 3.5–4.6)
ALP BLD-CCNC: 112 U/L (ref 40–130)
ALT SERPL-CCNC: 9 U/L (ref 0–33)
ANION GAP SERPL CALCULATED.3IONS-SCNC: 12 MEQ/L (ref 9–15)
AST SERPL-CCNC: 12 U/L (ref 0–35)
BASOPHILS ABSOLUTE: 0 K/UL (ref 0–0.2)
BASOPHILS RELATIVE PERCENT: 0.3 %
BILIRUB SERPL-MCNC: 0.6 MG/DL (ref 0.2–0.7)
BUN BLDV-MCNC: 14 MG/DL (ref 8–23)
CALCIUM SERPL-MCNC: 9.1 MG/DL (ref 8.5–9.9)
CHLORIDE BLD-SCNC: 102 MEQ/L (ref 95–107)
CO2: 22 MEQ/L (ref 20–31)
CREAT SERPL-MCNC: 0.65 MG/DL (ref 0.5–0.9)
EOSINOPHILS ABSOLUTE: 0.6 K/UL (ref 0–0.7)
EOSINOPHILS RELATIVE PERCENT: 6.4 %
GFR AFRICAN AMERICAN: >60
GFR NON-AFRICAN AMERICAN: >60
GLOBULIN: 3.8 G/DL (ref 2.3–3.5)
GLUCOSE BLD-MCNC: 117 MG/DL (ref 60–115)
GLUCOSE BLD-MCNC: 130 MG/DL (ref 70–99)
GLUCOSE BLD-MCNC: 137 MG/DL (ref 60–115)
GLUCOSE BLD-MCNC: 182 MG/DL (ref 60–115)
HCT VFR BLD CALC: 34.7 % (ref 37–47)
HEMOGLOBIN: 11.5 G/DL (ref 12–16)
LYMPHOCYTES ABSOLUTE: 1.1 K/UL (ref 1–4.8)
LYMPHOCYTES RELATIVE PERCENT: 10.9 %
MCH RBC QN AUTO: 30.7 PG (ref 27–31.3)
MCHC RBC AUTO-ENTMCNC: 33.1 % (ref 33–37)
MCV RBC AUTO: 92.6 FL (ref 82–100)
MONOCYTES ABSOLUTE: 1.2 K/UL (ref 0.2–0.8)
MONOCYTES RELATIVE PERCENT: 11.8 %
NEUTROPHILS ABSOLUTE: 6.9 K/UL (ref 1.4–6.5)
NEUTROPHILS RELATIVE PERCENT: 70.6 %
PDW BLD-RTO: 15.2 % (ref 11.5–14.5)
PERFORMED ON: ABNORMAL
PLATELET # BLD: 467 K/UL (ref 130–400)
POTASSIUM REFLEX MAGNESIUM: 3.7 MEQ/L (ref 3.4–4.9)
RBC # BLD: 3.75 M/UL (ref 4.2–5.4)
SODIUM BLD-SCNC: 136 MEQ/L (ref 135–144)
TOTAL PROTEIN: 6.8 G/DL (ref 6.3–8)
WBC # BLD: 9.8 K/UL (ref 4.8–10.8)

## 2021-03-07 PROCEDURE — 71045 X-RAY EXAM CHEST 1 VIEW: CPT

## 2021-03-07 PROCEDURE — 6370000000 HC RX 637 (ALT 250 FOR IP): Performed by: INTERNAL MEDICINE

## 2021-03-07 PROCEDURE — 99233 SBSQ HOSP IP/OBS HIGH 50: CPT | Performed by: INTERNAL MEDICINE

## 2021-03-07 PROCEDURE — 36415 COLL VENOUS BLD VENIPUNCTURE: CPT

## 2021-03-07 PROCEDURE — 2060000000 HC ICU INTERMEDIATE R&B

## 2021-03-07 PROCEDURE — 2580000003 HC RX 258: Performed by: NURSE PRACTITIONER

## 2021-03-07 PROCEDURE — 6370000000 HC RX 637 (ALT 250 FOR IP): Performed by: NURSE PRACTITIONER

## 2021-03-07 PROCEDURE — 80053 COMPREHEN METABOLIC PANEL: CPT

## 2021-03-07 PROCEDURE — 99221 1ST HOSP IP/OBS SF/LOW 40: CPT | Performed by: PSYCHIATRY & NEUROLOGY

## 2021-03-07 PROCEDURE — 85025 COMPLETE CBC W/AUTO DIFF WBC: CPT

## 2021-03-07 PROCEDURE — 99231 SBSQ HOSP IP/OBS SF/LOW 25: CPT | Performed by: INTERNAL MEDICINE

## 2021-03-07 RX ORDER — LORAZEPAM 2 MG/ML
1 INJECTION INTRAMUSCULAR
Status: ACTIVE | OUTPATIENT
Start: 2021-03-07 | End: 2021-03-07

## 2021-03-07 RX ADMIN — ACETAMINOPHEN 650 MG: 325 TABLET ORAL at 20:21

## 2021-03-07 RX ADMIN — METOPROLOL TARTRATE 25 MG: 25 TABLET, FILM COATED ORAL at 08:45

## 2021-03-07 RX ADMIN — METOPROLOL TARTRATE 25 MG: 25 TABLET, FILM COATED ORAL at 20:20

## 2021-03-07 RX ADMIN — MIRTAZAPINE 7.5 MG: 15 TABLET, FILM COATED ORAL at 20:20

## 2021-03-07 RX ADMIN — Medication 10 ML: at 20:31

## 2021-03-07 RX ADMIN — Medication 10 ML: at 09:00

## 2021-03-07 ASSESSMENT — ENCOUNTER SYMPTOMS
VOMITING: 0
NAUSEA: 0
BLOOD IN STOOL: 0

## 2021-03-07 NOTE — PROGRESS NOTES
INPATIENT PROGRESS NOTES    PATIENT NAME: Sri Murillo  MRN: [id number]  SERVICE DATE:  March 7, 2021   SERVICE TIME:  4:19 PM      PRIMARY SERVICE: Pulmonary Disease    CHIEF COMPLAIN: Right pleural effusion      INTERVAL HPI: Patient seen and examined at bedside, Interval Notes, orders reviewed. Nursing notes noted  Patient denies having short of breath. Comfortable in bed. Chest x-ray with decubitus films showing loculated pleural effusion. She has a history of fall with multiple rib fracture on right side. OBJECTIVE    Body mass index is 19.88 kg/m². PHYSICAL EXAM:  Vitals:  BP (!) 139/45   Pulse 92   Temp 98.2 °F (36.8 °C) (Oral)   Resp 18   Ht 5' (1.524 m)   Wt 101 lb 12.8 oz (46.2 kg)   LMP  (LMP Unknown)   SpO2 97%   BMI 19.88 kg/m²   General: Alert, awake . comfortable in bed, No distress. Head: Atraumatic , Normocephalic   Eyes: PERRL. No sclera icterus. No conjunctival injection. No discharge   ENT: No nasal  discharge. Pharynx clear. Neck:  Trachea midline. No thyromegaly, no JVD, No cervical adenopathy. Chest : Bilaterally symmetrical ,Normal effort,  No accessory muscle use  Lung : Diminished breath sound at right base. No Rales. No wheezing. No rhonchi. Heart[de-identified] Normal  rate. Regular rhythm. No mumur ,  Rub or gallop  ABD: Non-tender. Non-distended. No masses. No organmegaly. Normal bowel sounds. No hernia.   Ext : No Pitting both leg , No Cyanosis No clubbing  Neuro: no focal weakness          DATA:   Recent Labs     03/06/21  0622 03/07/21  0551   WBC 9.4 9.8   HGB 10.8* 11.5*   HCT 33.0* 34.7*   MCV 92.3 92.6   * 467*     Recent Labs     03/06/21  0622 03/07/21  0551    136   K 3.8 3.7    102   CO2 22 22   BUN 11 14   CREATININE 0.63 0.65   GLUCOSE 124* 130*   CALCIUM 9.1 9.1   PROT 6.5 6.8   LABALBU 3.2* 3.0*   BILITOT 0.5 0.6   ALKPHOS 103 112   AST 17 12   ALT 10 9   LABGLOM >60.0 >60.0   GFRAA >60.0 >60.0   GLOB 3.3 3.8*       MV Settings:          No results for input(s): PHART, WAM4FEC, PO2ART, ESY2VKS, BEART, O9DZBCFK in the last 72 hours. O2 Device: None (Room air)  O2 Flow Rate (L/min): 0 L/min    DIET GENERAL;     MEDICATIONS during current hospitalization:    Continuous Infusions:   dextrose         Scheduled Meds:   metoprolol tartrate  25 mg Oral BID    mirtazapine  7.5 mg Oral Nightly    sodium chloride flush  10 mL Intravenous 2 times per day    insulin lispro  0-12 Units Subcutaneous TID WC    insulin lispro  0-6 Units Subcutaneous Nightly       PRN Meds:LORazepam, metoprolol, haloperidol lactate, sodium chloride flush, promethazine **OR** ondansetron, polyethylene glycol, acetaminophen **OR** acetaminophen, glucose, dextrose, glucagon (rDNA), dextrose    Radiology  Echocardiogram Complete 2d With Doppler With Color    Result Date: 3/5/2021  Transthoracic Echocardiography Report (TTE)  Demographics  Patient Name    Shara Storm Gender                Female  Patient Number  [id number]      Race                                                  Ethnicity  Visit Number    [id number]     Room Number           W180  Corporate ID                  Date of Study         03/05/2021  Accession       [id number]    Referring Physician  Number  Date of Birth   02/10/1928    Sonographer           Melissa Perry MAME  Age             80 year(s)    Interpreting          [facility])                                Physician             Cardiology                                                      [address] [place] [address] Procedure Type of Study  TTE procedure:ECHO COMPLETE 2D W/DOP W/COLOR. Procedure Date Date: 03/05/2021 Start: 09:56 AM Study Location: Portable Technical Quality: Adequate visualization Indications:Atrial fibrillation. Patient Status: Routine Height: 60 inches Weight: 145 pounds BSA: 1.63 m^2 BMI: 28.32 kg/m^2 BP: 153/47 mmHg Allergies   - No known allergies. Conclusions  Summary  Aortic valve leaflets are mildly thickened.   Trivial aortic regurgitation is noted. Normal tricuspid valve structure and function. There is mild ( 1 +) tricuspid regurgitation with estimated RVSP of 31 mm  Hg. Mildly dilated left atrium. Normal left ventricle structure and function. Impaired relaxation compatible with diastolic dysfunction. ( reversed E/A  ratio)  Moderate concentric left ventricular hypertrophy. Signature  ----------------------------------------------------------------  Electronically signed by Alethea Hutchinson(Interpreting physician)  on 03/05/2021 01:28 PM  ----------------------------------------------------------------  Findings Left Ventricle Normal left ventricle structure and function. Impaired relaxation compatible with diastolic dysfunction. ( reversed E/A ratio) Moderate concentric left ventricular hypertrophy. Right Ventricle Normal right ventricle structure and function. Left Atrium Mildly dilated left atrium. Right Atrium Normal right atrium. Mitral Valve Mitral valve leaflets are mildly thickened with preserved leaflet mobility. Tricuspid Valve Normal tricuspid valve structure and function. There is mild ( 1 +) tricuspid regurgitation with estimated RVSP of 31 mm Hg. Aortic Valve Aortic valve leaflets are mildly thickened. Trivial aortic regurgitation is noted. Pulmonic Valve Normal pulmonic valve structure and function. Pericardial Effusion No evidence of pericardial effusion. Pleural Effusion No evidence of pleural effusion. Aorta \ Miscellaneous Miscellaneous normal findings were found. M-Mode Measurements (cm)  LVIDd: 4.19 cm                         LVIDs: 2.64 cm  IVSd: 1.07 cm                          IVSs: 1.29 cm  LVPWd: 1.01 cm                         LVPWs: 1.33 cm  Rt. Vent.  Dimension: 3.26 cm           AO Root Dimension: 3.32 cm                                         ACS: 1.48 cm                                         LA: 3.12 cm Doppler Measurements:  AV Peak Gradient: 12.22 mmHg           MV Peak E-Wave: 0.68 m/s  AV Mean cm    Xr Acute Abd Series Chest 1 Vw    Result Date: 3/5/2021  XR ACUTE ABD SERIES CHEST 1 VW : 3/4/2021 CLINICAL HISTORY:  nausea and vomiting . COMPARISON: Portable chest 9/16/2020 and CT abdomen pelvis 2/15/2011. TECHNIQUE: An upright PA radiograph of the chest, and upright and supine radiographs of the abdomen and pelvis were obtained. FINDINGS: Several acute-appearing right mid to lower posterolateral rib fractures are noted, with a moderate-sized right pleural effusion and probable compressive right basilar atelectasis. There is no pneumothorax, significant left pleural effusion, cardiomegaly, or other acute fractures identified. Chronic healed deformity of the proximal right humerus, degenerative changes and mild rotary levoscoliosis of the lumbar spine appear unchanged. There is a normal bowel gas pattern. SEVERAL ACUTE RIGHT MID TO LOWER POSTEROLATERAL RIGHT RIB FRACTURES. MODERATE-SIZED RIGHT PLEURAL EFFUSION AND PROBABLE COMPRESSIVE ATELECTASIS. Ct Head Wo Contrast    Result Date: 3/4/2021  CT Brain. Contrast medium:  without contrast.. History:  Confusion. Technical factors: CT imaging of the brain was obtained and formatted as 5 mm contiguous axial images. 2.5 mm contiguous axial images were obtained through the osseous structures. Sagittal and coronal reconstruction obtained during postprocessing. Comparison:  CT brain, September 16, 2020 MRI/MRA brain,  August 28, 2015. Findings: Study limited secondary to motion artifact. Extra-axial spaces:  Normal. Intracranial hemorrhage:  None. Ventricular system: Ventricles mildly enlarged. Sulci mildly prominent. Basal Cisterns:  Normal. Cerebral Parenchyma: Bilateral symmetric periventricular areas decreased attenuation Midline Shift:  None. Cerebellum:  Normal. Paranasal sinuses and mastoid air cells:  Normal. Visualized Orbits:  Normal.     Impression: Study limited secondary to motion artifact. No acute findings. Mild cerebral atrophy.  Chronic ischemic white matter disease. All CT scans at this facility use dose modulation, iterative reconstruction, and/or weight based dosing when appropriate to reduce radiation dose to as low as reasonably achievable. Xr Chest Decubitus Right    Result Date: 3/6/2021  EXAMINATION:  XR CHEST DECUBITUS LEFT, XR CHEST DECUBITUS RIGHT CLINICAL HISTORY:  pleural effusion . Melissa Profit COMPARISONS:  NONE AVAILABLE TECHNIQUE:  Bilateral decubitus films of the chest. 2 views are submitted. FINDINGS:  There is a right pleural effusion probably loculated as it does not appear to be free flowing. No left-sided pleural effusion. The field-of-view there is fractures of the lateral aspect of the right seventh eighth ninth and 10th ribs. RIGHT-SIDED PLEURAL EFFUSION WHICH IS PROBABLY LOCULATED AS IT IS NOT FREELY FLOWING. Xr Chest Decubitus Left    Result Date: 3/6/2021  EXAMINATION:  XR CHEST DECUBITUS LEFT, XR CHEST DECUBITUS RIGHT CLINICAL HISTORY:  pleural effusion . Melissa Profit COMPARISONS:  NONE AVAILABLE TECHNIQUE:  Bilateral decubitus films of the chest. 2 views are submitted. FINDINGS:  There is a right pleural effusion probably loculated as it does not appear to be free flowing. No left-sided pleural effusion. The field-of-view there is fractures of the lateral aspect of the right seventh eighth ninth and 10th ribs. RIGHT-SIDED PLEURAL EFFUSION WHICH IS PROBABLY LOCULATED AS IT IS NOT FREELY FLOWING. IMPRESSION AND SUGGESTION:  1. Right pleural effusion, loculated. 2. History of fall with multiple right rib fracture. 3. New onset A. Fib  4. Pulmonary artery disease status post stent  5. Dementia    Patient had loculated right-sided pleural effusion which could be due to after trauma and right-sided rib fracture and possibility of hemothorax. Her hemoglobin is stable and which could be old when she fell 2 weeks ago. Dr. Karlene Samaniego is consulted. May do diagnostic tap under ultrasound tomorrow.   She is on room air and O2

## 2021-03-07 NOTE — PROGRESS NOTES
Progress Note  Patient: Luan Jung  Unit/Bed: Q039/F561-26  YOB: 1928  MRN: [id number]  Acct: [de-identified]   Admitting Diagnosis: New onset a-fib [facility]) [I48.91]  New onset a-fib [facility]) [I48.91]  Date:  3/4/2021  Hospital Day: 3    Chief Complaint:  A. fib    Subjective  Significant dementia or agitation. .  Remains in sinus rhythm  :   Review of Systems   Constitutional: Negative for diaphoresis. HENT: Negative for nosebleeds. Cardiovascular: Negative for chest pain, palpitations and leg swelling. Gastrointestinal: Negative for blood in stool, nausea and vomiting. Musculoskeletal: Negative for myalgias. Neurological: Negative for dizziness, seizures, weakness and headaches. Psychiatric/Behavioral: The patient is not nervous/anxious.           Physical Examination:    BP (!) 160/65   Pulse 80   Temp 98.2 °F (36.8 °C) (Oral)   Resp 18   Ht 5' (1.524 m)   Wt 101 lb 12.8 oz (46.2 kg)   LMP  (LMP Unknown)   SpO2 98%   BMI 19.88 kg/m²    Physical Exam    LABS:  CBC:   Lab Results   Component Value Date    WBC 9.8 03/07/2021    RBC 3.75 03/07/2021    RBC 4.05 11/07/2011    HGB 11.5 03/07/2021    HCT 34.7 03/07/2021    MCV 92.6 03/07/2021    MCH 30.7 03/07/2021    MCHC 33.1 03/07/2021    RDW 15.2 03/07/2021     03/07/2021    MPV 8.4 09/01/2015     CBC with Differential:   Lab Results   Component Value Date    WBC 9.8 03/07/2021    RBC 3.75 03/07/2021    RBC 4.05 11/07/2011    HGB 11.5 03/07/2021    HCT 34.7 03/07/2021     03/07/2021    MCV 92.6 03/07/2021    MCH 30.7 03/07/2021    MCHC 33.1 03/07/2021    RDW 15.2 03/07/2021    BANDSPCT 5 03/10/2015    LYMPHOPCT 10.9 03/07/2021    MONOPCT 11.8 03/07/2021    EOSPCT 20.5 11/07/2011    BASOPCT 0.3 03/07/2021    MONOSABS 1.2 03/07/2021    LYMPHSABS 1.1 03/07/2021    EOSABS 0.6 03/07/2021    BASOSABS 0.0 03/07/2021     CMP:    Lab Results   Component Value Date     03/07/2021    K 3.7 03/07/2021     03/07/2021    CO2 22 03/07/2021    BUN 14 03/07/2021    CREATININE 0.65 03/07/2021    GFRAA >60.0 03/07/2021    LABGLOM >60.0 03/07/2021    GLUCOSE 130 03/07/2021    GLUCOSE 121 11/07/2011    PROT 6.8 03/07/2021    LABALBU 3.0 03/07/2021    LABALBU 4.2 11/07/2011    CALCIUM 9.1 03/07/2021    BILITOT 0.6 03/07/2021    ALKPHOS 112 03/07/2021    AST 12 03/07/2021    ALT 9 03/07/2021     BMP:    Lab Results   Component Value Date     03/07/2021    K 3.7 03/07/2021     03/07/2021    CO2 22 03/07/2021    BUN 14 03/07/2021    LABALBU 3.0 03/07/2021    LABALBU 4.2 11/07/2011    CREATININE 0.65 03/07/2021    CALCIUM 9.1 03/07/2021    GFRAA >60.0 03/07/2021    LABGLOM >60.0 03/07/2021    GLUCOSE 130 03/07/2021    GLUCOSE 121 11/07/2011     Magnesium:    Lab Results   Component Value Date    MG 1.9 05/03/2019     Troponin:    Lab Results   Component Value Date    TROPONINI <0.010 03/04/2021       Radiology:  Xr Chest Decubitus Right    Result Date: 3/6/2021  EXAMINATION:  XR CHEST DECUBITUS LEFT, XR CHEST DECUBITUS RIGHT CLINICAL HISTORY:  pleural effusion . Windy John COMPARISONS:  NONE AVAILABLE TECHNIQUE:  Bilateral decubitus films of the chest. 2 views are submitted. FINDINGS:  There is a right pleural effusion probably loculated as it does not appear to be free flowing. No left-sided pleural effusion. The field-of-view there is fractures of the lateral aspect of the right seventh eighth ninth and 10th ribs. RIGHT-SIDED PLEURAL EFFUSION WHICH IS PROBABLY LOCULATED AS IT IS NOT FREELY FLOWING. Xr Chest Decubitus Left    Result Date: 3/6/2021  EXAMINATION:  XR CHEST DECUBITUS LEFT, XR CHEST DECUBITUS RIGHT CLINICAL HISTORY:  pleural effusion . Windy John COMPARISONS:  NONE AVAILABLE TECHNIQUE:  Bilateral decubitus films of the chest. 2 views are submitted. FINDINGS:  There is a right pleural effusion probably loculated as it does not appear to be free flowing. No left-sided pleural effusion.  The field-of-view there is fractures of the lateral

## 2021-03-07 NOTE — PROGRESS NOTES
Assessment complete. See flow sheets. Patient refusing to eat or drink. HS medication not given. She is confused and asking for her mom and dad, reoriented to time and place. Lap belt/roll belt is in place for patients safety so she does not harm herself. Avasys in room for redirection also.

## 2021-03-07 NOTE — PROGRESS NOTES
Patient awake. Confused, reoriented, continues to refuse water and food. Repositioned, gave warm blanket. Remains in lap belt restraint with avasys in room.

## 2021-03-07 NOTE — CONSULTS
Subjective:      Patient ID: Carlee Gaucher is a 80 y.o. female who presents today for  confusion. HPI 51-year-old right-handed female history of dementia presents to emergency room with nausea vomiting and patient continued to become more confused. Patient is not aware that she is hospitalized she feels that she just had a cold. She is conversing with quite babbled and this could be her baseline speech. She is not complain of any symptoms. She is compliant with examination but review of system was difficult to a certain. It does appear from her evaluation that she has been falling and she is hit her head couple of times though there was no loss of consciousness. Review of Systems   Unable to perform ROS: Mental status change       Past Medical History:   Diagnosis Date    CAD (coronary artery disease)     Chronic anemia     Dementia ([address]     Morongo (hard of hearing)     Hyperlipidemia     Hypertension     Type II or unspecified type diabetes mellitus without mention of complication, not stated as uncontrolled      Past Surgical History:   Procedure Laterality Date    BLADDER SUSPENSION      CARDIAC CATHETERIZATION      CATARACT REMOVAL      CHOLECYSTECTOMY  1990    COLONOSCOPY  1-07    CORONARY ANGIOPLASTY WITH STENT PLACEMENT      CORONARY ANGIOPLASTY WITH STENT PLACEMENT  5-08    FOOT SURGERY      L    HYSTERECTOMY  1964     Social History     Socioeconomic History    Marital status:       Spouse name: Not on file    Number of children: Not on file    Years of education: Not on file    Highest education level: Not on file   Occupational History    Not on file   Social Needs    Financial resource strain: Not on file    Food insecurity     Worry: Not on file     Inability: Not on file    Transportation needs     Medical: Not on file     Non-medical: Not on file   Tobacco Use    Smoking status: Never Smoker    Smokeless tobacco: Never Used   Substance and Sexual Activity    Alcohol use: No    Drug use: No    Sexual activity: Not on file   Lifestyle    Physical activity     Days per week: Not on file     Minutes per session: Not on file    Stress: Not on file   Relationships    Social connections     Talks on phone: Not on file     Gets together: Not on file     Attends Taoism service: Not on file     Active member of club or organization: Not on file     Attends meetings of clubs or organizations: Not on file     Relationship status: Not on file    Intimate partner violence     Fear of current or ex partner: Not on file     Emotionally abused: Not on file     Physically abused: Not on file     Forced sexual activity: Not on file   Other Topics Concern    Not on file   Social History Narrative    Not on file     Family History   Problem Relation Age of Onset    Heart Disease Mother     Diabetes Mother     Cancer Father         LUNG     No Known Allergies  Current Facility-Administered Medications   Medication Dose Route Frequency Provider Last Rate Last Admin    LORazepam (ATIVAN) injection 1 mg  1 mg Intravenous Once PRN Andreas Ritter MD        metoprolol (LOPRESSOR) injection 2.5 mg  2.5 mg Intravenous Q6H PRN Serena Celestin MD        metoprolol tartrate (LOPRESSOR) tablet 25 mg  25 mg Oral BID Missy Aquino MD   25 mg at 03/07/21 0845    mirtazapine (REMERON) tablet 7.5 mg  7.5 mg Oral Nightly Esvin Mulders Sedar, DO        haloperidol lactate (HALDOL) injection 1 mg  1 mg Intramuscular Q6H PRN Esvin Mulgarfield Sedar, DO        sodium chloride flush 0.9 % injection 10 mL  10 mL Intravenous 2 times per day Loreda Kail, APRN - CNP   10 mL at 03/07/21 0900    sodium chloride flush 0.9 % injection 10 mL  10 mL Intravenous PRN Loreda Kail, APRN - CNP        promethazine (PHENERGAN) tablet 12.5 mg  12.5 mg Oral Q6H PRN Loreda Kail, APRN - CNP        Or    ondansetron (ZOFRAN) injection 4 mg  4 mg Intravenous Q6H PRN Loreda Kail, APRN - CNP        polyethylene glycol (GLYCOLAX) packet 17 g  17 g Oral Daily PRN Princess Dubs, APRN - CNP        acetaminophen (TYLENOL) tablet 650 mg  650 mg Oral Q6H PRN Princess Dubs, APRN - CNP   650 mg at 03/05/21 2207    Or    acetaminophen (TYLENOL) suppository 650 mg  650 mg Rectal Q6H PRN Princess Dubs, APRN - CNP        glucose (GLUTOSE) 40 % oral gel 15 g  15 g Oral PRN Princess Dubs, APRN - CNP        dextrose 50 % IV solution  12.5 g Intravenous PRN Princess Dubs, APRN - CNP        glucagon (rDNA) injection 1 mg  1 mg Intramuscular PRN Princess Dubs, APRN - CNP        dextrose 5 % solution  100 mL/hr Intravenous PRN Princess Dubs, APRN - CNP        insulin lispro (HUMALOG) injection vial 0-12 Units  0-12 Units Subcutaneous TID WC Princess Dubs, APRN - CNP        insulin lispro (HUMALOG) injection vial 0-6 Units  0-6 Units Subcutaneous Nightly Princess Dubs, APRN - CNP   1 Units at 03/05/21 2205        Objective:   BP (!) 160/65   Pulse 80   Temp 98.2 °F (36.8 °C) (Oral)   Resp 18   Ht 5' (1.524 m)   Wt 101 lb 12.8 oz (46.2 kg)   LMP  (LMP Unknown)   SpO2 98%   BMI 19.88 kg/m²     Physical Exam  Vitals signs reviewed. Eyes:      Pupils: Pupils are equal, round, and reactive to light. Neck:      Musculoskeletal: Normal range of motion. Cardiovascular:      Rate and Rhythm: Normal rate and regular rhythm. Heart sounds: No murmur. Pulmonary:      Effort: Pulmonary effort is normal.      Breath sounds: Normal breath sounds. Abdominal:      General: Bowel sounds are normal.   Musculoskeletal: Normal range of motion. Skin:     General: Skin is warm. Neurological:      Mental Status: She is alert. She is disoriented. Cranial Nerves: No cranial nerve deficit. Sensory: No sensory deficit. Motor: No abnormal muscle tone.       Coordination: Coordination normal.      Deep Tendon Reflexes: Reflexes are normal and symmetric. Babinski sign absent on the right side. Babinski sign absent on the left side. Psychiatric:         Mood and Affect: Mood normal.     Patient has considerable bruising from the falls. Initially it was noted that she was having some right-sided weakness though she is moving her arm well but not the leg is much. Posterior strength appears to be 4/5. We did not attempt to walk her she is babbling and somewhat difficult to understand    Echocardiogram Complete 2d With Doppler With Color    Result Date: 3/5/2021  Transthoracic Echocardiography Report (TTE)  Demographics  Patient Name    Yee Vazquez Gender                Female  Patient Number  [id number]      Race                                                  Ethnicity  Visit Number    [id number]     Room Number           W180  Corporate ID                  Date of Study         03/05/2021  Accession       [id number]    Referring Physician  Number  Date of Birth   02/10/1928    Sonographer           Estella Gusman  Age             80 year(s)    Interpreting          [facility])                                Physician             Cardiology                                                      [address] [place] [address] Procedure Type of Study  TTE procedure:ECHO COMPLETE 2D W/DOP W/COLOR. Procedure Date Date: 03/05/2021 Start: 09:56 AM Study Location: Portable Technical Quality: Adequate visualization Indications:Atrial fibrillation. Patient Status: Routine Height: 60 inches Weight: 145 pounds BSA: 1.63 m^2 BMI: 28.32 kg/m^2 BP: 153/47 mmHg Allergies   - No known allergies. Conclusions  Summary  Aortic valve leaflets are mildly thickened. Trivial aortic regurgitation is noted. Normal tricuspid valve structure and function. There is mild ( 1 +) tricuspid regurgitation with estimated RVSP of 31 mm  Hg. Mildly dilated left atrium. Normal left ventricle structure and function.   Impaired relaxation compatible with diastolic dysfunction. ( reversed E/A  ratio)  Moderate concentric left ventricular hypertrophy. Signature  ----------------------------------------------------------------  Electronically signed by James Hutchinson(Interpreting physician)  on 03/05/2021 01:28 PM  ----------------------------------------------------------------  Findings Left Ventricle Normal left ventricle structure and function. Impaired relaxation compatible with diastolic dysfunction. ( reversed E/A ratio) Moderate concentric left ventricular hypertrophy. Right Ventricle Normal right ventricle structure and function. Left Atrium Mildly dilated left atrium. Right Atrium Normal right atrium. Mitral Valve Mitral valve leaflets are mildly thickened with preserved leaflet mobility. Tricuspid Valve Normal tricuspid valve structure and function. There is mild ( 1 +) tricuspid regurgitation with estimated RVSP of 31 mm Hg. Aortic Valve Aortic valve leaflets are mildly thickened. Trivial aortic regurgitation is noted. Pulmonic Valve Normal pulmonic valve structure and function. Pericardial Effusion No evidence of pericardial effusion. Pleural Effusion No evidence of pleural effusion. Aorta \ Miscellaneous Miscellaneous normal findings were found. M-Mode Measurements (cm)  LVIDd: 4.19 cm                         LVIDs: 2.64 cm  IVSd: 1.07 cm                          IVSs: 1.29 cm  LVPWd: 1.01 cm                         LVPWs: 1.33 cm  Rt. Vent.  Dimension: 3.26 cm           AO Root Dimension: 3.32 cm                                         ACS: 1.48 cm                                         LA: 3.12 cm Doppler Measurements:  AV Peak Gradient: 12.22 mmHg           MV Peak E-Wave: 0.68 m/s  AV Mean Gradient: 7.6 mmHg             MV Peak A-Wave: 1.05 m/s  TR Velocity:2.67 m/s  TR Gradient:28.61 mmHg                                         Estimated RAP:3 mmHg                                         RVSP:31.61 mmHg Valves  Mitral Valve  Peak E-Wave: 0.68 m/s Peak A-Wave: 1.05 m/s                                        E/A Ratio: 0.65                                        Peak Gradient: 1.85 mmHg                                        Deceleration Time: 260.1 msec  Tissue Doppler  E' Septal Velocity: 0.06 m/s  E' Lateral Velocity: 0.05 m/s  Aortic Valve  Peak Velocity: 1.75 m/s               Mean Velocity: 1.33 m/s  Peak Gradient: 12.22 mmHg             Mean Gradient: 7.6 mmHg  AV VTI: 32.27 cm  Cusp Separation: 1.48 cm  Tricuspid Valve  Estimated RVSP: 31.61 mmHg              Estimated RAP: 3 mmHg  TR Velocity: 2.67 m/s                   TR Gradient: 28.61 mmHg  Pulmonic Valve  Peak Velocity: 1.12 m/s           Peak Gradient: 5.02 mmHg                                    Estimated PASP: 31.61 mmHg  LVOT  Peak Velocity: 0.94 m/s              Mean Velocity: 0.6 m/s  Peak Gradient: 2.88 mmHg             Mean Gradient: 1.62 mmHg                                       LVOT VTI: 17.21 cm Structures  Left Atrium  LA Dimension: 3.12 cm                        LA Area: 14.08 cm^2  LA/Aorta: 0.94  LA Volume/Index: 37.82 ml /23 m^2  Left Ventricle  Diastolic Dimension: 9.86 cm        Systolic Dimension: 8.89 cm  Septum Diastolic: 6.62 cm           Septum Systolic: 4.53 cm  PW Diastolic: 4.22 cm               PW Systolic: 4.11 cm                                      FS: 37 %  LV EDV/LV EDV Index: 78 ml/48 m^2   LV ESV/LV ESV Index: 25.5 ml/16 m^2  EF Calculated: 67.3 %               LV Length: 5.44 cm  Right Atrium  RA Systolic Pressure: 3 mmHg  Right Ventricle  Diastolic Dimension: 5.96 cm                                    RV Systolic Pressure: 43.64 mmHg Aorta/ Miscellaneous Aorta  Aortic Root: 3.32 cm    Xr Acute Abd Series Chest 1 Vw    Result Date: 3/5/2021  XR ACUTE ABD SERIES CHEST 1 VW : 3/4/2021 CLINICAL HISTORY:  nausea and vomiting . COMPARISON: Portable chest 9/16/2020 and CT abdomen pelvis 2/15/2011.  TECHNIQUE: An upright PA radiograph of the chest, and upright and supine radiographs of the abdomen and pelvis were obtained. FINDINGS: Several acute-appearing right mid to lower posterolateral rib fractures are noted, with a moderate-sized right pleural effusion and probable compressive right basilar atelectasis. There is no pneumothorax, significant left pleural effusion, cardiomegaly, or other acute fractures identified. Chronic healed deformity of the proximal right humerus, degenerative changes and mild rotary levoscoliosis of the lumbar spine appear unchanged. There is a normal bowel gas pattern. SEVERAL ACUTE RIGHT MID TO LOWER POSTEROLATERAL RIGHT RIB FRACTURES. MODERATE-SIZED RIGHT PLEURAL EFFUSION AND PROBABLE COMPRESSIVE ATELECTASIS. Ct Head Wo Contrast    Result Date: 3/4/2021  CT Brain. Contrast medium:  without contrast.. History:  Confusion. Technical factors: CT imaging of the brain was obtained and formatted as 5 mm contiguous axial images. 2.5 mm contiguous axial images were obtained through the osseous structures. Sagittal and coronal reconstruction obtained during postprocessing. Comparison:  CT brain, September 16, 2020 MRI/MRA brain,  August 28, 2015. Findings: Study limited secondary to motion artifact. Extra-axial spaces:  Normal. Intracranial hemorrhage:  None. Ventricular system: Ventricles mildly enlarged. Sulci mildly prominent. Basal Cisterns:  Normal. Cerebral Parenchyma: Bilateral symmetric periventricular areas decreased attenuation Midline Shift:  None. Cerebellum:  Normal. Paranasal sinuses and mastoid air cells:  Normal. Visualized Orbits:  Normal.     Impression: Study limited secondary to motion artifact. No acute findings. Mild cerebral atrophy. Chronic ischemic white matter disease. All CT scans at this facility use dose modulation, iterative reconstruction, and/or weight based dosing when appropriate to reduce radiation dose to as low as reasonably achievable.       Lab Results   Component Value Date    WBC 9.8 03/07/2021 RBC 3.75 03/07/2021    RBC 4.05 11/07/2011    HGB 11.5 03/07/2021    HCT 34.7 03/07/2021    MCV 92.6 03/07/2021    MCH 30.7 03/07/2021    MCHC 33.1 03/07/2021    RDW 15.2 03/07/2021     03/07/2021    MPV 8.4 09/01/2015     Lab Results   Component Value Date     03/07/2021    K 3.7 03/07/2021     03/07/2021    CO2 22 03/07/2021    BUN 14 03/07/2021    CREATININE 0.65 03/07/2021    GFRAA >60.0 03/07/2021    LABGLOM >60.0 03/07/2021    GLUCOSE 130 03/07/2021    GLUCOSE 121 11/07/2011    PROT 6.8 03/07/2021    LABALBU 3.0 03/07/2021    LABALBU 4.2 11/07/2011    CALCIUM 9.1 03/07/2021    BILITOT 0.6 03/07/2021    ALKPHOS 112 03/07/2021    AST 12 03/07/2021    ALT 9 03/07/2021     Lab Results   Component Value Date    PROTIME 13.1 03/05/2021    INR 1.0 03/05/2021     Lab Results   Component Value Date    TSH 1.730 03/09/2019    SOCRZMOO93 621 08/29/2016    FOLATE >20.0 08/30/2015    FERRITIN 62.1 12/03/2014     Lab Results   Component Value Date    TRIG 133 03/09/2019    HDL 68 03/09/2019    HDL 63 06/15/2011    LDLCALC 105 03/09/2019     No results found for: LABAMPH, BARBSCNU, LABBENZ, CANNAB, COCAINESCRN, LABMETH, OPIATESCREENURINE, PHENCYCLIDINESCREENURINE, PPXUR, ETOH  No results found for: LITHIUM, DILFRTOT, VALPROATE    Assessment:   Dementia with worsening with the possibility of underlying possible strokes which can occur in atrial fibrillation patient is not on anticoagulation patient has been falling. We will try and obtain an MRI of the brain if he can as she appears to be somewhat agitated we will perform this under sedation. Patient has gait ataxia with frontal gait disorder with significant dementia. I am awaiting her daughter for more history. Otherwise patient has a nonfocal examination but there may be some right-sided findings. Alessandro Chan MD, Mayford Cooks, American Board of Psychiatry & Neurology  Board Certified in Vascular Neurology  Board Certified in Neuromuscular Medicine  Certified in Neurorehabilitation         Plan:

## 2021-03-07 NOTE — PROGRESS NOTES
Hospitalist Daily Progress Note  Name: Deisi Garcia  Age: 80 y.o. Gender: female  CodeStatus: Full Code  Allergies: No Known Allergies    Chief Complaint:Emesis (nauseated was given 4 mg of zofran and 500 ml of NS )    Primary Care Provider: Sonnie Hashimoto, MD  InpatientTreatment Team: Treatment Team: Attending Provider: Bryant Erazo DO; Consulting Physician: Osiris Blount MD; Utilization Reviewer: Yolanda Webb RN; Consulting Physician: Vee Haque MD; Utilization Reviewer: Citlalli Potter RN; Consulting Physician: Kacie Dodge MD; Consulting Physician: Dareen Burkitt, MD; Respiratory Therapist (Day): Rhys Cespedes RCP; Registered Nurse: Zechariah Hall RN; Registered Nurse: Trice Chairez RN  Admission Date: 3/4/2021      Subjective: Patient seen and evaluated at bedside. Patient was seen by cardiology, neurology, pulmonology today. Patient remains on remains profoundly confused needs assistance with feeding. Remains on lapbelt restraint due to impulsivity and difficulty to safely monitor in the bed. Patient is afebrile vitals are stable. Daughter called to update, message left. Physical Exam  Constitutional:       Comments: Thin and cachectic. Agitated, confused   HENT:      Head: Normocephalic and atraumatic. Mouth/Throat:      Mouth: Mucous membranes are moist.      Pharynx: Oropharynx is clear. Eyes:      Conjunctiva/sclera: Conjunctivae normal.      Pupils: Pupils are equal, round, and reactive to light. Cardiovascular:      Rate and Rhythm: Tachycardia present. Rhythm irregular. Heart sounds: Murmur present. No friction rub. No gallop. Comments: BEST 2 out of 6  Pulmonary:      Breath sounds: Rales present. No wheezing or rhonchi. Abdominal:      General: There is no distension. Tenderness: There is no abdominal tenderness. There is no guarding. Musculoskeletal: Normal range of motion. Right lower leg: No edema. Left lower leg: No edema. Neurological:      Mental Status: She is alert. Comments: Oriented only to self. Psychiatric:      Comments: Odd mood and affect, very hard of hearing, but poor insight. Review of Systems  Reliable ROS not able to be obtained due to confusion. Medications:  Reviewed    Infusion Medications:    dextrose       Scheduled Medications:    metoprolol tartrate  25 mg Oral BID    mirtazapine  7.5 mg Oral Nightly    sodium chloride flush  10 mL Intravenous 2 times per day    insulin lispro  0-12 Units Subcutaneous TID WC    insulin lispro  0-6 Units Subcutaneous Nightly     PRN Meds: LORazepam, metoprolol, haloperidol lactate, sodium chloride flush, promethazine **OR** ondansetron, polyethylene glycol, acetaminophen **OR** acetaminophen, glucose, dextrose, glucagon (rDNA), dextrose    Labs:   Recent Labs     03/05/21  0815 03/06/21 0622 03/07/21  0551   WBC 8.7 9.4 9.8   HGB 11.6* 10.8* 11.5*   HCT 34.1* 33.0* 34.7*   * 492* 467*     Recent Labs     03/05/21  0815 03/06/21  0622 03/07/21  0551    136 136   K 3.7 3.8 3.7   CL 99 101 102   CO2 20 22 22   BUN 10 11 14   CREATININE 0.63 0.63 0.65   CALCIUM 9.3 9.1 9.1     Recent Labs     03/05/21  0815 03/06/21  0622 03/07/21  0551   AST 24 17 12   ALT 13 10 9   BILITOT 0.8* 0.5 0.6   ALKPHOS 122 103 112     Recent Labs     03/05/21  0815   INR 1.0     No results for input(s): CKTOTAL, TROPONINI in the last 72 hours. Urinalysis:   Lab Results   Component Value Date    NITRU Negative 03/04/2021    WBCUA 0-2 03/04/2021    BACTERIA Negative 03/04/2021    RBCUA 0-2 03/04/2021    BLOODU Negative 03/04/2021    SPECGRAV 1.015 03/04/2021    GLUCOSEU Negative 03/04/2021       Radiology:   Most recent    Chest CT      WITH CONTRAST:No results found for this or any previous visit. WITHOUT CONTRAST: No results found for this or any previous visit.     CXR      2-view:   Results for orders placed during the hospital encounter of 09/29/18   XR CHEST STANDARD (2 VW)    Narrative EXAMINATION: XR CHEST (2 VW)    CLINICAL HISTORY: SYNCOPE    COMPARISONS: AUGUST 20 17,015    FINDINGS: Osteopenia. Remote fracture healed right humeral neck. Patient leaning to right. Cardiopericardial silhouette normal. Aorta tortuous and calcified. Lungs clear. Impression NO ACUTE CARDIOPULMONARY DISEASE        Portable:   Results for orders placed during the hospital encounter of 09/16/20   XR CHEST PORTABLE    Narrative EXAMINATION: CHEST PORTABLE VIEW     CLINICAL HISTORY: Midsternal chest pain    COMPARISONS: May 3, 2019     FINDINGS:    Single  views of the chest is submitted. The cardiac silhouette is enlarged. Pulmonary vascular attenuated  Right sided trachea. No focal infiltrates. No Pneumothoraces. Probable old healed fracture right humeral head with associated severe degenerative joint disease                                                                                    Impression NO ACUTE ACTIVE CARDIOPULMONARY PROCESS       Echo No results found for this or any previous visit. Assessment/Plan:    Active Hospital Problems    Diagnosis Date Noted    Multiple closed fractures of ribs of right side [S22.41XA]     New onset a-fib ([address] [I48.91] 03/04/2021    Nausea & vomiting [R11.2] 03/04/2021    Falls [W19. XXXA] 03/04/2021    General weakness [R53.1] 03/04/2021    Hyponatremia [E87.1] 03/04/2021    Pleural effusion, right [J90] 03/04/2021    Dementia ([address] [F03.90]     Type 2 diabetes mellitus with complication ([address] [T37.9] 12/16/2015    CAD (coronary artery disease) [I25.10]      Atrial Fibrillation with RVR: hold anticoagulation for possible procedure inpt. Poor candidate for chronic anticoagulation given frequent falls. Continue beta blocker. Cardiology following. Right pleural effusion: Consult to IR for thoracentesis.  Consult pulmonary for recommendations regarding attempting any procedures given her advanced age and poor functional status. . Lateral cxr imaging shows loculated effusion    Dementia with confusion: daughter notes dramatic worsening compared to baseline. Ct head negative. No infectious cause. Consult to neuro per family request.  Given the patient's relatively advanced dementia with poor functional status the patient does have a very poor prognosis. This is left with family to discuss further plans of care    Falls with multiple rib fracture: PT/OT evaluation. se Remeron before bed, haldol 1mg IM PRN for agitation. DVT PPX held for possible thoracentesis. Additional work up or/and treatment plan may be added today or then after based on clinical progression. I am managing a portion of pt care. Some medical issues are handled byother specialists. Additional work up and treatment should be done in out pt setting by pt PCP and other out pt providers. In addition to examining and evaluating pt, I spent additional time explaining care, normaland abnormal findings, and treatment plan. All of pt questions were answered. Counseling, diet and education were provided. Case will be discussed with nursing staff when appropriate. Family will be updated if and whenappropriate.       Electronically signed by Barbara Pettit DO on 3/7/2021 at 6:44 PM

## 2021-03-07 NOTE — PROGRESS NOTES
Physical Therapy Missed Treatment   Facility/Department: [facility] MED SURG [id number]    NAME: Júnior Davenport    : 2/10/1928 (80 y.o.)  MRN: [id number]    Account: [de-identified]  Gender: female      PT evaluation and treatment orders received. Chart reviewed. PT evaluation attempted. Pt sleeping, however easily awoken with verbal cues. Pt very confused. Unable to engage in conversation. Resistant to manual facilitation/cues. Will hold PT evaluation this date. RIGO Daily notified. Will follow and attempt PT evaluation again at earliest availability.        Jose Bermudez, PT, 21 at 8:35 AM

## 2021-03-08 ENCOUNTER — APPOINTMENT (OUTPATIENT)
Dept: MRI IMAGING | Age: 86
DRG: 308 | End: 2021-03-08
Payer: MEDICARE

## 2021-03-08 LAB
ALBUMIN SERPL-MCNC: 2.9 G/DL (ref 3.5–4.6)
ALP BLD-CCNC: 108 U/L (ref 40–130)
ALT SERPL-CCNC: 8 U/L (ref 0–33)
ANION GAP SERPL CALCULATED.3IONS-SCNC: 14 MEQ/L (ref 9–15)
AST SERPL-CCNC: 16 U/L (ref 0–35)
BASOPHILS ABSOLUTE: 0 K/UL (ref 0–0.2)
BASOPHILS RELATIVE PERCENT: 0.2 %
BILIRUB SERPL-MCNC: 0.7 MG/DL (ref 0.2–0.7)
BUN BLDV-MCNC: 19 MG/DL (ref 8–23)
CALCIUM SERPL-MCNC: 8.6 MG/DL (ref 8.5–9.9)
CHLORIDE BLD-SCNC: 101 MEQ/L (ref 95–107)
CO2: 22 MEQ/L (ref 20–31)
CREAT SERPL-MCNC: 0.6 MG/DL (ref 0.5–0.9)
EOSINOPHILS ABSOLUTE: 0.5 K/UL (ref 0–0.7)
EOSINOPHILS RELATIVE PERCENT: 4.9 %
FOLATE: 13.9 NG/ML (ref 7.3–26.1)
GFR AFRICAN AMERICAN: >60
GFR NON-AFRICAN AMERICAN: >60
GLOBULIN: 3.7 G/DL (ref 2.3–3.5)
GLUCOSE BLD-MCNC: 125 MG/DL (ref 60–115)
GLUCOSE BLD-MCNC: 127 MG/DL (ref 70–99)
GLUCOSE BLD-MCNC: 128 MG/DL (ref 60–115)
GLUCOSE BLD-MCNC: 131 MG/DL (ref 60–115)
GLUCOSE BLD-MCNC: 151 MG/DL (ref 60–115)
HCT VFR BLD CALC: 34.1 % (ref 37–47)
HEMOGLOBIN: 11.2 G/DL (ref 12–16)
LYMPHOCYTES ABSOLUTE: 0.7 K/UL (ref 1–4.8)
LYMPHOCYTES RELATIVE PERCENT: 7.9 %
MCH RBC QN AUTO: 30.6 PG (ref 27–31.3)
MCHC RBC AUTO-ENTMCNC: 32.7 % (ref 33–37)
MCV RBC AUTO: 93.4 FL (ref 82–100)
MONOCYTES ABSOLUTE: 1.1 K/UL (ref 0.2–0.8)
MONOCYTES RELATIVE PERCENT: 11.7 %
NEUTROPHILS ABSOLUTE: 7 K/UL (ref 1.4–6.5)
NEUTROPHILS RELATIVE PERCENT: 75.3 %
PDW BLD-RTO: 14.8 % (ref 11.5–14.5)
PERFORMED ON: ABNORMAL
PLATELET # BLD: 379 K/UL (ref 130–400)
POTASSIUM REFLEX MAGNESIUM: 3.6 MEQ/L (ref 3.4–4.9)
RBC # BLD: 3.65 M/UL (ref 4.2–5.4)
SODIUM BLD-SCNC: 137 MEQ/L (ref 135–144)
TOTAL PROTEIN: 6.6 G/DL (ref 6.3–8)
VITAMIN B-12: 320 PG/ML (ref 232–1245)
WBC # BLD: 9.3 K/UL (ref 4.8–10.8)

## 2021-03-08 PROCEDURE — 99231 SBSQ HOSP IP/OBS SF/LOW 25: CPT | Performed by: NURSE PRACTITIONER

## 2021-03-08 PROCEDURE — APPSS30 APP SPLIT SHARED TIME 16-30 MINUTES: Performed by: NURSE PRACTITIONER

## 2021-03-08 PROCEDURE — 80053 COMPREHEN METABOLIC PANEL: CPT

## 2021-03-08 PROCEDURE — 6360000002 HC RX W HCPCS: Performed by: PSYCHIATRY & NEUROLOGY

## 2021-03-08 PROCEDURE — 99232 SBSQ HOSP IP/OBS MODERATE 35: CPT | Performed by: INTERNAL MEDICINE

## 2021-03-08 PROCEDURE — 85025 COMPLETE CBC W/AUTO DIFF WBC: CPT

## 2021-03-08 PROCEDURE — 82746 ASSAY OF FOLIC ACID SERUM: CPT

## 2021-03-08 PROCEDURE — 2060000000 HC ICU INTERMEDIATE R&B

## 2021-03-08 PROCEDURE — 99233 SBSQ HOSP IP/OBS HIGH 50: CPT | Performed by: PSYCHIATRY & NEUROLOGY

## 2021-03-08 PROCEDURE — 2580000003 HC RX 258: Performed by: NURSE PRACTITIONER

## 2021-03-08 PROCEDURE — 70551 MRI BRAIN STEM W/O DYE: CPT

## 2021-03-08 PROCEDURE — 6370000000 HC RX 637 (ALT 250 FOR IP): Performed by: INTERNAL MEDICINE

## 2021-03-08 PROCEDURE — 36415 COLL VENOUS BLD VENIPUNCTURE: CPT

## 2021-03-08 PROCEDURE — 82607 VITAMIN B-12: CPT

## 2021-03-08 RX ORDER — LORAZEPAM 2 MG/ML
1 INJECTION INTRAMUSCULAR ONCE
Status: COMPLETED | OUTPATIENT
Start: 2021-03-08 | End: 2021-03-08

## 2021-03-08 RX ORDER — LORAZEPAM 2 MG/ML
1 INJECTION INTRAMUSCULAR ONCE
Status: DISCONTINUED | OUTPATIENT
Start: 2021-03-08 | End: 2021-03-08

## 2021-03-08 RX ADMIN — METOPROLOL TARTRATE 25 MG: 25 TABLET, FILM COATED ORAL at 22:31

## 2021-03-08 RX ADMIN — Medication 10 ML: at 08:21

## 2021-03-08 RX ADMIN — LORAZEPAM 1 MG: 2 INJECTION INTRAMUSCULAR; INTRAVENOUS at 08:21

## 2021-03-08 RX ADMIN — MIRTAZAPINE 7.5 MG: 15 TABLET, FILM COATED ORAL at 22:31

## 2021-03-08 RX ADMIN — Medication 10 ML: at 22:31

## 2021-03-08 ASSESSMENT — ENCOUNTER SYMPTOMS
VOMITING: 0
COUGH: 0
COLOR CHANGE: 0
WHEEZING: 0
TROUBLE SWALLOWING: 0

## 2021-03-08 ASSESSMENT — PAIN SCALES - GENERAL: PAINLEVEL_OUTOF10: 0

## 2021-03-08 NOTE — PROGRESS NOTES
Progress Note  Patient: Cj Meraz  Unit/Bed: S644/W561-18  YOB: 1928  MRN: [id number]  Acct: [de-identified]   Admitting Diagnosis: New onset a-fib [facility]) [I48.91]  New onset a-fib [facility]) [I48.91]  Admit Date:  3/4/2021  Hospital Day: 4    Chief Complaint:AF    Histories:  Past Medical History:   Diagnosis Date    CAD (coronary artery disease)     Chronic anemia     Dementia ([address]     [place] (hard of hearing)     Hyperlipidemia     Hypertension     Type II or unspecified type diabetes mellitus without mention of complication, not stated as uncontrolled      Past Surgical History:   Procedure Laterality Date    BLADDER SUSPENSION      CARDIAC CATHETERIZATION      CATARACT REMOVAL      CHOLECYSTECTOMY  1990    COLONOSCOPY  1-07    CORONARY ANGIOPLASTY WITH STENT PLACEMENT      CORONARY ANGIOPLASTY WITH STENT PLACEMENT  5-08    FOOT SURGERY      L    HYSTERECTOMY  1964     Family History   Problem Relation Age of Onset    Heart Disease Mother     Diabetes Mother     Cancer Father         LUNG     Social History     Socioeconomic History    Marital status:       Spouse name: None    Number of children: None    Years of education: None    Highest education level: None   Occupational History    None   Social Needs    Financial resource strain: None    Food insecurity     Worry: None     Inability: None    Transportation needs     Medical: None     Non-medical: None   Tobacco Use    Smoking status: Never Smoker    Smokeless tobacco: Never Used   Substance and Sexual Activity    Alcohol use: No    Drug use: No    Sexual activity: None   Lifestyle    Physical activity     Days per week: None     Minutes per session: None    Stress: None   Relationships    Social connections     Talks on phone: None     Gets together: None     Attends Oriental orthodox service: None     Active member of club or organization: None     Attends meetings of clubs or organizations: None     Relationship status: None    Intimate partner violence     Fear of current or ex partner: None     Emotionally abused: None     Physically abused: None     Forced sexual activity: None   Other Topics Concern    None   Social History Narrative    None       Subjective/HPI very sleepy from Ativan. No new events. EKG:SR         Review of Systems:   Review of Systems  Not reliable    Physical Examination:    BP (!) 167/47   Pulse 92   Temp 98.2 °F (36.8 °C) (Oral)   Resp 17   Ht 5' (1.524 m)   Wt 101 lb 12.8 oz (46.2 kg)   LMP  (LMP Unknown)   SpO2 99%   BMI 19.88 kg/m²    Physical Exam   Constitutional: She appears healthy. No distress. HENT:   Normal cephalic and Atraumatic   Eyes: Pupils are equal, round, and reactive to light. Neck: Normal range of motion and thyroid normal. Neck supple. No JVD present. No neck adenopathy. No thyromegaly present. Cardiovascular: Normal rate, regular rhythm, intact distal pulses and normal pulses. Murmur heard. Pulmonary/Chest: Effort normal and breath sounds normal. She has no wheezes. She has no rales. She exhibits no tenderness. Abdominal: Soft. Bowel sounds are normal. There is no abdominal tenderness. Musculoskeletal: Normal range of motion. General: No tenderness or edema. Neurological: She exhibits altered mental status. Skin: Skin is warm. No cyanosis. Nails show no clubbing.        LABS:  CBC:   Lab Results   Component Value Date    WBC 9.3 03/08/2021    RBC 3.65 03/08/2021    RBC 4.05 11/07/2011    HGB 11.2 03/08/2021    HCT 34.1 03/08/2021    MCV 93.4 03/08/2021    MCH 30.6 03/08/2021    MCHC 32.7 03/08/2021    RDW 14.8 03/08/2021     03/08/2021    MPV 8.4 09/01/2015     CBC with Differential:    Lab Results   Component Value Date    WBC 9.3 03/08/2021    RBC 3.65 03/08/2021    RBC 4.05 11/07/2011    HGB 11.2 03/08/2021    HCT 34.1 03/08/2021     03/08/2021    MCV 93.4 03/08/2021    MCH 30.6 03/08/2021    MCHC 32.7 03/08/2021    RDW 14.8 03/08/2021    BANDSPCT 5 03/10/2015    LYMPHOPCT 7.9 03/08/2021    MONOPCT 11.7 03/08/2021    EOSPCT 20.5 11/07/2011    BASOPCT 0.2 03/08/2021    MONOSABS 1.1 03/08/2021    LYMPHSABS 0.7 03/08/2021    EOSABS 0.5 03/08/2021    BASOSABS 0.0 03/08/2021     CMP:    Lab Results   Component Value Date     03/08/2021    K 3.6 03/08/2021     03/08/2021    CO2 22 03/08/2021    BUN 19 03/08/2021    CREATININE 0.60 03/08/2021    GFRAA >60.0 03/08/2021    LABGLOM >60.0 03/08/2021    GLUCOSE 127 03/08/2021    GLUCOSE 121 11/07/2011    PROT 6.6 03/08/2021    LABALBU 2.9 03/08/2021    LABALBU 4.2 11/07/2011    CALCIUM 8.6 03/08/2021    BILITOT 0.7 03/08/2021    ALKPHOS 108 03/08/2021    AST 16 03/08/2021    ALT 8 03/08/2021     BMP:    Lab Results   Component Value Date     03/08/2021    K 3.6 03/08/2021     03/08/2021    CO2 22 03/08/2021    BUN 19 03/08/2021    LABALBU 2.9 03/08/2021    LABALBU 4.2 11/07/2011    CREATININE 0.60 03/08/2021    CALCIUM 8.6 03/08/2021    GFRAA >60.0 03/08/2021    LABGLOM >60.0 03/08/2021    GLUCOSE 127 03/08/2021    GLUCOSE 121 11/07/2011     Magnesium:    Lab Results   Component Value Date    MG 1.9 05/03/2019     Troponin:    Lab Results   Component Value Date    TROPONINI <0.010 03/04/2021        Active Hospital Problems    Diagnosis Date Noted    Multiple closed fractures of ribs of right side [S22.41XA]      Priority: Low    New onset a-fib ([facility] [address] [I48.91] 03/04/2021     Priority: Low    Nausea & vomiting [R11.2] 03/04/2021     Priority: Low    Falls [W19. XXXA] 03/04/2021     Priority: Low    General weakness [R53.1] 03/04/2021     Priority: Low    Hyponatremia [E87.1] 03/04/2021     Priority: Low    Pleural effusion, right [J90] 03/04/2021     Priority: Low    Dementia ([facility] [F03.90]      Priority: Low    Type 2 diabetes mellitus with complication ([facility] [R40.3] 12/16/2015     Priority: Low    CAD (coronary artery disease) [I25.10]      Priority: Low Assessment/Plan:  1. AF- rate control w BB. OK for Lovenox while here but do not recommend NOAC due to increased risks. 2. HTN - mostly stable  3. Dementia.    4. Echo LVEF normal        Electronically signed by José Miguel Sosa MD on 3/8/2021 at 9:03 AM

## 2021-03-08 NOTE — PROGRESS NOTES
INPATIENT PROGRESS NOTES    PATIENT NAME: Nicole Baxter  MRN: [id number]  SERVICE DATE:  March 8, 2021   SERVICE TIME:  4:16 PM      PRIMARY SERVICE: Pulmonary Disease    CHIEF COMPLAIN: Right pleural effusion      INTERVAL HPI: Patient seen and examined at bedside, Interval Notes, orders reviewed. Nursing notes noted  Patient is sleepy. As per RN she received medication for MRI. See appear comfortable in bed. Chest x-ray with decubitus films showing loculated pleural effusion. She has a history of fall with multiple rib fracture on right side. Her CODE STATUS changed to DNR CCA. With her age and condition she is high risk for any surgical intervention. She is on room air and O2 saturation 98% and not having short of breath. OBJECTIVE    Body mass index is 19.88 kg/m². PHYSICAL EXAM:  Vitals:  BP (!) 146/55   Pulse 84   Temp 98.2 °F (36.8 °C) (Axillary)   Resp 17   Ht 5' (1.524 m)   Wt 101 lb 12.8 oz (46.2 kg)   LMP  (LMP Unknown)   SpO2 98%   BMI 19.88 kg/m²   General: Alert, awake . comfortable in bed, No distress. Head: Atraumatic , Normocephalic   Eyes: PERRL. No sclera icterus. No conjunctival injection. No discharge   ENT: No nasal  discharge. Pharynx clear. Neck:  Trachea midline. No thyromegaly, no JVD, No cervical adenopathy. Chest : Bilaterally symmetrical ,Normal effort,  No accessory muscle use  Lung : Diminished breath sound at right base. No Rales. No wheezing. No rhonchi. Heart[de-identified] Normal  rate. Regular rhythm. No mumur ,  Rub or gallop  ABD: Non-tender. Non-distended. No masses. No organmegaly. Normal bowel sounds. No hernia.   Ext : No Pitting both leg , No Cyanosis No clubbing  Neuro: no focal weakness          DATA:   Recent Labs     03/07/21  0551 03/08/21  0551   WBC 9.8 9.3   HGB 11.5* 11.2*   HCT 34.7* 34.1*   MCV 92.6 93.4   * 379     Recent Labs     03/07/21  0551 03/08/21  0551    137   K 3.7 3.6    101   CO2 22 22   BUN 14 19   CREATININE 0.65 0.60 GLUCOSE 130* 127*   CALCIUM 9.1 8.6   PROT 6.8 6.6   LABALBU 3.0* 2.9*   BILITOT 0.6 0.7   ALKPHOS 112 108   AST 12 16   ALT 9 8   LABGLOM >60.0 >60.0   GFRAA >60.0 >60.0   GLOB 3.8* 3.7*       MV Settings:          No results for input(s): PHART, COL5TVT, PO2ART, CJX1EWN, BEART, R5SCKNYF in the last 72 hours. O2 Device: None (Room air)  O2 Flow Rate (L/min): 0 L/min    DIET GENERAL;     MEDICATIONS during current hospitalization:    Continuous Infusions:   dextrose         Scheduled Meds:   metoprolol tartrate  25 mg Oral BID    mirtazapine  7.5 mg Oral Nightly    sodium chloride flush  10 mL Intravenous 2 times per day    insulin lispro  0-12 Units Subcutaneous TID WC    insulin lispro  0-6 Units Subcutaneous Nightly       PRN Meds:metoprolol, haloperidol lactate, sodium chloride flush, promethazine **OR** ondansetron, polyethylene glycol, acetaminophen **OR** acetaminophen, glucose, dextrose, glucagon (rDNA), dextrose    Radiology  Echocardiogram Complete 2d With Doppler With Color    Result Date: 3/5/2021  Transthoracic Echocardiography Report (TTE)  Demographics  Patient Name    Malia Tierney Gender                Female  Patient Number  [id number]      Race                                                  Ethnicity  Visit Number    [id number]     Room Number           W180  Corporate ID                  Date of Study         03/05/2021  Accession       [id number]    Referring Physician  Number  Date of Birth   02/10/1928    Sonographer           Jose J Valenzuela [facility]  Age             80 year(s)    Interpreting          [facility])                                Physician             Cardiology                                                      [address] [place] [address] Procedure Type of Study  TTE procedure:ECHO COMPLETE 2D W/DOP W/COLOR. Procedure Date Date: 03/05/2021 Start: 09:56 AM Study Location: Portable Technical Quality: Adequate visualization Indications:Atrial fibrillation.  Patient Status: Routine Height: 60 inches Weight: 145 pounds BSA: 1.63 m^2 BMI: 28.32 kg/m^2 BP: 153/47 mmHg Allergies   - No known allergies. Conclusions  Summary  Aortic valve leaflets are mildly thickened. Trivial aortic regurgitation is noted. Normal tricuspid valve structure and function. There is mild ( 1 +) tricuspid regurgitation with estimated RVSP of 31 mm  Hg. Mildly dilated left atrium. Normal left ventricle structure and function. Impaired relaxation compatible with diastolic dysfunction. ( reversed E/A  ratio)  Moderate concentric left ventricular hypertrophy. Signature  ----------------------------------------------------------------  Electronically signed by Marek Hutchinson(Interpreting physician)  on 03/05/2021 01:28 PM  ----------------------------------------------------------------  Findings Left Ventricle Normal left ventricle structure and function. Impaired relaxation compatible with diastolic dysfunction. ( reversed E/A ratio) Moderate concentric left ventricular hypertrophy. Right Ventricle Normal right ventricle structure and function. Left Atrium Mildly dilated left atrium. Right Atrium Normal right atrium. Mitral Valve Mitral valve leaflets are mildly thickened with preserved leaflet mobility. Tricuspid Valve Normal tricuspid valve structure and function. There is mild ( 1 +) tricuspid regurgitation with estimated RVSP of 31 mm Hg. Aortic Valve Aortic valve leaflets are mildly thickened. Trivial aortic regurgitation is noted. Pulmonic Valve Normal pulmonic valve structure and function. Pericardial Effusion No evidence of pericardial effusion. Pleural Effusion No evidence of pleural effusion. Aorta \ Miscellaneous Miscellaneous normal findings were found. M-Mode Measurements (cm)  LVIDd: 4.19 cm                         LVIDs: 2.64 cm  IVSd: 1.07 cm                          IVSs: 1.29 cm  LVPWd: 1.01 cm                         LVPWs: 1.33 cm  Rt. Vent.  Dimension: 3.26 cm           AO Root Dimension: 3.32 cm ACS: 1.48 cm                                         LA: 3.12 cm Doppler Measurements:  AV Peak Gradient: 12.22 mmHg           MV Peak E-Wave: 0.68 m/s  AV Mean Gradient: 7.6 mmHg             MV Peak A-Wave: 1.05 m/s  TR Velocity:2.67 m/s  TR Gradient:28.61 mmHg                                         Estimated RAP:3 mmHg                                         RVSP:31.61 mmHg Valves  Mitral Valve  Peak E-Wave: 0.68 m/s                 Peak A-Wave: 1.05 m/s                                        E/A Ratio: 0.65                                        Peak Gradient: 1.85 mmHg                                        Deceleration Time: 260.1 msec  Tissue Doppler  E' Septal Velocity: 0.06 m/s  E' Lateral Velocity: 0.05 m/s  Aortic Valve  Peak Velocity: 1.75 m/s               Mean Velocity: 1.33 m/s  Peak Gradient: 12.22 mmHg             Mean Gradient: 7.6 mmHg  AV VTI: 32.27 cm  Cusp Separation: 1.48 cm  Tricuspid Valve  Estimated RVSP: 31.61 mmHg              Estimated RAP: 3 mmHg  TR Velocity: 2.67 m/s                   TR Gradient: 28.61 mmHg  Pulmonic Valve  Peak Velocity: 1.12 m/s           Peak Gradient: 5.02 mmHg                                    Estimated PASP: 31.61 mmHg  LVOT  Peak Velocity: 0.94 m/s              Mean Velocity: 0.6 m/s  Peak Gradient: 2.88 mmHg             Mean Gradient: 1.62 mmHg                                       LVOT VTI: 17.21 cm Structures  Left Atrium  LA Dimension: 3.12 cm                        LA Area: 14.08 cm^2  LA/Aorta: 0.94  LA Volume/Index: 37.82 ml /23 m^2  Left Ventricle  Diastolic Dimension: 2.47 cm        Systolic Dimension: 5.06 cm  Septum Diastolic: 1.32 cm           Septum Systolic: 7.76 cm  PW Diastolic: 6.82 cm               PW Systolic: 6.66 cm                                      FS: 37 %  LV EDV/LV EDV Index: 78 ml/48 m^2   LV ESV/LV ESV Index: 25.5 ml/16 m^2  EF Calculated: 67.3 %               LV Length: 5.44 cm  Right Atrium RA Systolic Pressure: 3 mmHg  Right Ventricle  Diastolic Dimension: 2.73 cm                                    RV Systolic Pressure: 20.19 mmHg Aorta/ Miscellaneous Aorta  Aortic Root: 3.32 cm    Xr Acute Abd Series Chest 1 Vw    Result Date: 3/5/2021  XR ACUTE ABD SERIES CHEST 1 VW : 3/4/2021 CLINICAL HISTORY:  nausea and vomiting . COMPARISON: Portable chest 9/16/2020 and CT abdomen pelvis 2/15/2011. TECHNIQUE: An upright PA radiograph of the chest, and upright and supine radiographs of the abdomen and pelvis were obtained. FINDINGS: Several acute-appearing right mid to lower posterolateral rib fractures are noted, with a moderate-sized right pleural effusion and probable compressive right basilar atelectasis. There is no pneumothorax, significant left pleural effusion, cardiomegaly, or other acute fractures identified. Chronic healed deformity of the proximal right humerus, degenerative changes and mild rotary levoscoliosis of the lumbar spine appear unchanged. There is a normal bowel gas pattern. SEVERAL ACUTE RIGHT MID TO LOWER POSTEROLATERAL RIGHT RIB FRACTURES. MODERATE-SIZED RIGHT PLEURAL EFFUSION AND PROBABLE COMPRESSIVE ATELECTASIS. Ct Head Wo Contrast    Result Date: 3/4/2021  CT Brain. Contrast medium:  without contrast.. History:  Confusion. Technical factors: CT imaging of the brain was obtained and formatted as 5 mm contiguous axial images. 2.5 mm contiguous axial images were obtained through the osseous structures. Sagittal and coronal reconstruction obtained during postprocessing. Comparison:  CT brain, September 16, 2020 MRI/MRA brain,  August 28, 2015. Findings: Study limited secondary to motion artifact. Extra-axial spaces:  Normal. Intracranial hemorrhage:  None. Ventricular system: Ventricles mildly enlarged. Sulci mildly prominent. Basal Cisterns:  Normal. Cerebral Parenchyma: Bilateral symmetric periventricular areas decreased attenuation Midline Shift:  None.  Cerebellum:  Normal. Paranasal sinuses and mastoid air cells:  Normal. Visualized Orbits:  Normal.     Impression: Study limited secondary to motion artifact. No acute findings. Mild cerebral atrophy. Chronic ischemic white matter disease. All CT scans at this facility use dose modulation, iterative reconstruction, and/or weight based dosing when appropriate to reduce radiation dose to as low as reasonably achievable. Xr Chest Decubitus Right    Result Date: 3/6/2021  EXAMINATION:  XR CHEST DECUBITUS LEFT, XR CHEST DECUBITUS RIGHT CLINICAL HISTORY:  pleural effusion . [employer] COMPARISONS:  NONE AVAILABLE TECHNIQUE:  Bilateral decubitus films of the chest. 2 views are submitted. FINDINGS:  There is a right pleural effusion probably loculated as it does not appear to be free flowing. No left-sided pleural effusion. The field-of-view there is fractures of the lateral aspect of the right seventh eighth ninth and 10th ribs. RIGHT-SIDED PLEURAL EFFUSION WHICH IS PROBABLY LOCULATED AS IT IS NOT FREELY FLOWING. Xr Chest Decubitus Left    Result Date: 3/6/2021  EXAMINATION:  XR CHEST DECUBITUS LEFT, XR CHEST DECUBITUS RIGHT CLINICAL HISTORY:  pleural effusion . [employer] COMPARISONS:  NONE AVAILABLE TECHNIQUE:  Bilateral decubitus films of the chest. 2 views are submitted. FINDINGS:  There is a right pleural effusion probably loculated as it does not appear to be free flowing. No left-sided pleural effusion. The field-of-view there is fractures of the lateral aspect of the right seventh eighth ninth and 10th ribs. RIGHT-SIDED PLEURAL EFFUSION WHICH IS PROBABLY LOCULATED AS IT IS NOT FREELY FLOWING. IMPRESSION AND SUGGESTION:  1. Right pleural effusion, loculated. 2. History of fall with multiple right rib fracture. 3. New onset A. Fib  4. Pulmonary artery disease status post stent  5.  Dementia    Patient had loculated right-sided pleural effusion which could be due to after trauma and right-sided rib fracture and possibility of hemothorax. Her hemoglobin is stable and which could be old when she fell 2 weeks ago. Dr. Jeniffer Pearce is consulted but  he has not seen the patient. NOTE: This report was transcribed using voice recognition software. Every effort was made to ensure accuracy; however, inadvertent computerized transcription errors may be present.       Electronically signed by Andressa Lira MD, FCCP on 3/8/2021 at 4:16 PM

## 2021-03-08 NOTE — CONSULTS
kg)   LMP  (LMP Unknown)   SpO2 99%   BMI 19.88 kg/m²    Physical Exam  Constitutional:       General: She is not in acute distress. Appearance: She is cachectic. She is not diaphoretic. HENT:      Head: Normocephalic and atraumatic. Right Ear: External ear normal.      Left Ear: External ear normal.      Nose: Nose normal.      Mouth/Throat:      Pharynx: No oropharyngeal exudate. Eyes:      General: No scleral icterus. Right eye: No discharge. Left eye: No discharge. Conjunctiva/sclera: Conjunctivae normal.      Pupils: Pupils are equal, round, and reactive to light. Neck:      Musculoskeletal: Normal range of motion and neck supple. Thyroid: No thyromegaly. Vascular: No JVD. Trachea: No tracheal deviation. Cardiovascular:      Rate and Rhythm: Normal rate and regular rhythm. Heart sounds: Normal heart sounds. Pulmonary:      Effort: Pulmonary effort is normal. No respiratory distress. Breath sounds: No stridor. Decreased breath sounds present. No wheezing or rales. Chest:      Chest wall: No tenderness. Abdominal:      General: Bowel sounds are normal. There is no distension. Palpations: Abdomen is soft. There is no mass. Tenderness: There is no abdominal tenderness. There is no guarding or rebound. Musculoskeletal: Normal range of motion. General: No tenderness or deformity. Lymphadenopathy:      Cervical: No cervical adenopathy. Skin:     General: Skin is warm and dry. Coloration: Skin is pale. Findings: No erythema or rash. Neurological:      Mental Status: She is unresponsive. Psychiatric:         Speech: She is noncommunicative.          Allergies:      No Known Allergies    Medications:      Current Facility-Administered Medications   Medication Dose Route Frequency Provider Last Rate Last Admin    metoprolol (LOPRESSOR) injection 2.5 mg  2.5 mg Intravenous Q6H PRN MD James Nath metoprolol tartrate (LOPRESSOR) tablet 25 mg  25 mg Oral BID Lizett David MD   25 mg at 03/07/21 2020    mirtazapine (REMERON) tablet 7.5 mg  7.5 mg Oral Nightly Celsa Kay Sedar, DO   7.5 mg at 03/07/21 2020    haloperidol lactate (HALDOL) injection 1 mg  1 mg Intramuscular Q6H PRN Celsa Kay Sedar, DO        sodium chloride flush 0.9 % injection 10 mL  10 mL Intravenous 2 times per day Lars Gomez APRN - CNP   10 mL at 03/08/21 2586    sodium chloride flush 0.9 % injection 10 mL  10 mL Intravenous PRN Lars Gomez, APRN - CNP        promethazine (PHENERGAN) tablet 12.5 mg  12.5 mg Oral Q6H PRN Lars Gomez, APRN - CNP        Or    ondansetron [facility]) injection 4 mg  4 mg Intravenous Q6H PRN Lars Gomez, APRN - CNP        polyethylene glycol (GLYCOLAX) packet 17 g  17 g Oral Daily PRN Lars Gomze APRN - CNP        acetaminophen (TYLENOL) tablet 650 mg  650 mg Oral Q6H PRN Lars Gomez, APRN - CNP   650 mg at 03/07/21 2021    Or    acetaminophen (TYLENOL) suppository 650 mg  650 mg Rectal Q6H PRN Lars Gomez APRN - CNP        glucose (GLUTOSE) 40 % oral gel 15 g  15 g Oral PRN Lars Gomez, APRN - CNP        dextrose 50 % IV solution  12.5 g Intravenous PRN Lars Gomez, APRN - CNP        glucagon (rDNA) injection 1 mg  1 mg Intramuscular PRN Lars Gomez, APRN - CNP        dextrose 5 % solution  100 mL/hr Intravenous PRN Lars Gomez APRN - CNP        insulin lispro (HUMALOG) injection vial 0-12 Units  0-12 Units Subcutaneous TID  Lars Gomez APRN - CNP        insulin lispro (HUMALOG) injection vial 0-6 Units  0-6 Units Subcutaneous Nightly Lars Gomez APRN - CNP   1 Units at 03/07/21 2030       History:       PMHx:  Past Medical History:   Diagnosis Date    CAD (coronary artery disease)     Chronic anemia     D[facility] [address]     [place] (hard of hearing)     Hyperlipidemia     Hypertension     Type II or unspecified type diabetes mellitus without mention of complication, not stated as uncontrolled        PSHx:  Past Surgical History:   Procedure Laterality Date    BLADDER SUSPENSION      CARDIAC CATHETERIZATION      CATARACT REMOVAL      CHOLECYSTECTOMY  1990    COLONOSCOPY  1-07    CORONARY ANGIOPLASTY WITH STENT PLACEMENT      CORONARY ANGIOPLASTY WITH STENT PLACEMENT  5-08    FOOT SURGERY      L    HYSTERECTOMY  1964       Social Hx:  Social History     Socioeconomic History    Marital status:       Spouse name: None    Number of children: None    Years of education: None    Highest education level: None   Occupational History    None   Social Needs    Financial resource strain: None    Food insecurity     Worry: None     Inability: None    Transportation needs     Medical: None     Non-medical: None   Tobacco Use    Smoking status: Never Smoker    Smokeless tobacco: Never Used   Substance and Sexual Activity    Alcohol use: No    Drug use: No    Sexual activity: None   Lifestyle    Physical activity     Days per week: None     Minutes per session: None    Stress: None   Relationships    Social connections     Talks on phone: None     Gets together: None     Attends Latter day service: None     Active member of club or organization: None     Attends meetings of clubs or organizations: None     Relationship status: None    Intimate partner violence     Fear of current or ex partner: None     Emotionally abused: None     Physically abused: None     Forced sexual activity: None   Other Topics Concern    None   Social History Narrative    None       Family Hx:  Family History   Problem Relation Age of Onset    Heart Disease Mother     Diabetes Mother     Cancer Father         LUNG       LABS: Reviewed     CBC:  Lab Results   Component Value Date    WBC 9.3 03/08/2021    RBC 3.65 03/08/2021    RBC 4.05 11/07/2011    HGB 11.2 03/08/2021    HCT 34.1 03/08/2021    MCV 93.4 03/08/2021    MCH 30.6 03/08/2021    MCHC 32.7 03/08/2021    RDW 14.8 03/08/2021     03/08/2021    MPV 8.4 09/01/2015     CBC with Differential:   Lab Results   Component Value Date    WBC 9.3 03/08/2021    RBC 3.65 03/08/2021    RBC 4.05 11/07/2011    HGB 11.2 03/08/2021    HCT 34.1 03/08/2021     03/08/2021    MCV 93.4 03/08/2021    MCH 30.6 03/08/2021    MCHC 32.7 03/08/2021    RDW 14.8 03/08/2021    BANDSPCT 5 03/10/2015    LYMPHOPCT 7.9 03/08/2021    MONOPCT 11.7 03/08/2021    EOSPCT 20.5 11/07/2011    BASOPCT 0.2 03/08/2021    MONOSABS 1.1 03/08/2021    LYMPHSABS 0.7 03/08/2021    EOSABS 0.5 03/08/2021    BASOSABS 0.0 03/08/2021     CMP:    Lab Results   Component Value Date     03/08/2021    K 3.6 03/08/2021     03/08/2021    CO2 22 03/08/2021    BUN 19 03/08/2021    CREATININE 0.60 03/08/2021    GFRAA >60.0 03/08/2021    LABGLOM >60.0 03/08/2021    GLUCOSE 127 03/08/2021    GLUCOSE 121 11/07/2011    PROT 6.6 03/08/2021    LABALBU 2.9 03/08/2021    LABALBU 4.2 11/07/2011    CALCIUM 8.6 03/08/2021    BILITOT 0.7 03/08/2021    ALKPHOS 108 03/08/2021    AST 16 03/08/2021    ALT 8 03/08/2021     BMP:    Lab Results   Component Value Date     03/08/2021    K 3.6 03/08/2021     03/08/2021    CO2 22 03/08/2021    BUN 19 03/08/2021    LABALBU 2.9 03/08/2021    LABALBU 4.2 11/07/2011    CREATININE 0.60 03/08/2021    CALCIUM 8.6 03/08/2021    GFRAA >60.0 03/08/2021    LABGLOM >60.0 03/08/2021    GLUCOSE 127 03/08/2021    GLUCOSE 121 11/07/2011     TSH:   Lab Results   Component Value Date    TSH 1.730 03/09/2019     Vitamin B12 and Folate: No components found for: FOLIC,  B24  Urinalysis:   Lab Results   Component Value Date    NITRU Negative 03/04/2021    WBCUA 0-2 03/04/2021    BACTERIA Negative 03/04/2021    RBCUA 0-2 03/04/2021    BLOODU Negative 03/04/2021    SPECGRAV 1.015 03/04/2021    GLUCOSEU Negative 03/04/2021 FUNCTIONAL ADL´S:     Independent: [  ] Eating, [   ] Dressing, [   ] Transferring, [   ] Toileting, [   ] Bathing, [   ] Continence  Dependent   : x  ] Eating, [   x] Dressing, [   ] Transferring, [ x  ] Toileting, [x ] Bathing, [  x ]x Continence  W. assistant : [  ] Eating, [   ] Dressing, [   ] Transferring, [   ] Donalynn Leone, [   ] Bathing, [   ] Continence    Radiology: Reviewed      Xr Chest Decubitus Right    Result Date: 3/6/2021  EXAMINATION:  XR CHEST DECUBITUS LEFT, XR CHEST DECUBITUS RIGHT CLINICAL HISTORY:  pleural effusion . COMPARISONS:  NONE AVAILABLE TECHNIQUE:  Bilateral decubitus films of the chest. 2 views are submitted. FINDINGS:  There is a right pleural effusion probably loculated as it does not appear to be free flowing. No left-sided pleural effusion. The field-of-view there is fractures of the lateral aspect of the right seventh eighth ninth and 10th ribs. RIGHT-SIDED PLEURAL EFFUSION WHICH IS PROBABLY LOCULATED AS IT IS NOT FREELY FLOWING. Xr Chest Decubitus Left    Result Date: 3/6/2021  EXAMINATION:  XR CHEST DECUBITUS LEFT, XR CHEST DECUBITUS RIGHT CLINICAL HISTORY:  pleural effusion . COMPARISONS:  NONE AVAILABLE TECHNIQUE:  Bilateral decubitus films of the chest. 2 views are submitted. FINDINGS:  There is a right pleural effusion probably loculated as it does not appear to be free flowing. No left-sided pleural effusion. The field-of-view there is fractures of the lateral aspect of the right seventh eighth ninth and 10th ribs. RIGHT-SIDED PLEURAL EFFUSION WHICH IS PROBABLY LOCULATED AS IT IS NOT FREELY FLOWING. Mri Brain Wor Contrast    Result Date: 3/8/2021  EXAMINATION: MRI BRAIN WO CONTRAST CLINICAL HISTORY:  stroke COMPARISONS: Brain MRI from August 20, 2015 TECHNIQUE: Multiplanar multisequence images of the brain were obtained without contrast. Diffusion perfusion imaging was obtained. FINDINGS:  There are no extra-axial collections.  There is no evidence of hemorrhage. There are no areas of perfusion diffusion signal abnormality to suggest ischemia. The susceptibility images do not demonstrate evidence of hemosiderin deposition within the brain parenchyma or the leptomeninges. There is preservation of the gray-white matter differentiation. There are numerous foci of T2/FLAIR hyperintensities in the subcortical and periventricular white matter in a symmetric distribution throughout both hemispheres. There is prominence of the sulci and ventricles consistent with moderate global cerebral atrophy and chronic involutional changes. There are dilated perivascular spaces versus lacunar infarcts in the bilateral basal ganglia. The midline structures are intact, the corpus callosum is within normal limits. The region of the pineal gland and the sella turcica are unremarkable. There are no space-occupying lesions in the posterior fossa. The basilar cisterns are patent. The craniocervical junction is unremarkable. The visualized portions of the orbits are within normal limits, the globes are intact. The visualized portions of the paranasal sinuses are within normal limits. The calvarium and soft tissues are unremarkable. There are no acute intracranial changes, there is no evidence of hemorrhage or acute ischemia. Assessment and plan:      -Advance Care Planning  Discussed goals of care with her daughter Josefina Davis. Explained in extensive detail nuances between full code, DNR CCA and DNR CC. Josefina Davis has made the decision to be:    DNR CCA  No feeding tube      Palliative Performance Scale 10  Estimated Fast 7d    Due to advanced dementia, Albumin 2.9, unable to maintain hydration and nutrition,inability to participate in rehab, and pneumothorax She would qualify for hospice care at this point      -Goals of Care Discussion:  Disease process and typical progression and goals of treatment were discussed in basic terms.  Nitza's  Goal for her mother is to optimize available comfort care measures to decrease hospitalizations and maximize function. We discussed the palliative care philosophy in light of those goals. We discussed all care options contingent on treatment response and QOL. Much active listening, presence, and emotional support were given. Anorexia  Continue Mirtazapine 7.5 mg po at [facility]  Consider nutritional supplements  Snacks at bedside      Trena Corbett is being treated by other specialties while hospitalized for the following:  Multiple closed fractures of ribs of right side [S22.41XA]       New onset a-fib ([facility] [address] [I48.91] 03/04/2021    Nausea & vomiting [R11.2] 03/04/2021    Falls [W19. XXXA] 03/04/2021    General weakness [R53.1] 03/04/2021    Hyponatremia [E87.1] 03/04/2021    Pleural effusion, right [J90] 03/04/2021    Dementia ([facility] [address] [F03.90]      Type 2 diabetes mellitus with complication ([facility] [address] [H05.9] 12/16/2015    CAD (coronary artery disease) [I25.10]            If family does not elect hospice or Trena Corbett improves she would benefit form outpatient palliative care treatment. Thank you for allowing me to participate in [address] [place] [address].         Electronically signed by COLT Quijano CNP on 3/8/2021 at 11:06 AM

## 2021-03-08 NOTE — FLOWSHEET NOTE
0830- Pt medicated with Ativan for MRI. Pt left floor to MRI via bed    0940- Pt back to room from MRI. Pt very lethargic and sleepy but able to arouse. Unable to eat breakfast or take meds. Daughter at bedside    1200- Pt still lethargic/sedated due to Ativan given for MRI. Pt is unable to eat lunch at this time    1300- L. Kg Lawton NP at bedside and updated    1330- Dr Slick Mcadams at bedside    1515- Pt washed up, diaper changed, and repositioned in bed. Attempted to feed pt and she refused. Mouth care performed    1700- Daughter at bedside and feeding patient. Pt ate a few bites and drank.  Mouth care performed Electronically signed by Nacho Waters RN on 3/8/2021 at 7:11 PM

## 2021-03-08 NOTE — PROGRESS NOTES
[facility] Neurology Daily Progress Note  Name: Mati Alan  Age: 80 y.o. Gender: female  CodeStatus: DNR-CCA  Allergies: No Known Allergies    Chief Complaint:Emesis (nauseated was given 4 mg of zofran and 500 ml of NS )    Primary Care Provider: Ebenezer Sweet MD  InpatientTreatment Team: Treatment Team: Attending Provider: Vanessa Lazaro MD; Consulting Physician: Vic Davey MD; Utilization Reviewer: Gely Hudson RN; Consulting Physician: Candy Giron MD; Utilization Reviewer: Brad Barnett RN; Consulting Physician: Kajal Fuller MD; Consulting Physician: Ani Bryan MD; Patient Care Tech: Presley Medina; Registered Nurse: Roma Estrella RN; Patient Care Tech: Lida Bravo; : IRWIN Escalante; : Lisa Anthony RN  Admission Date: 3/4/2021      HPI   Pt seen and examined for neuro follow up for dementia with behavioral disturbances in the setting of new onset atrial fibrillation and recurrent falls with multiple rib fractures. Patiently is currently sedated post MRI. Did discuss patient with nursing. When awake she is impulsive and restless and requiring some restraints. Confused at baseline but family reports that this is worse for her. No obvious focal neuro deficits. No seizure activity reported. Appetite poor. No dysphagia noted. Pt still pleasantly confused    Vitals:    03/08/21 0748   BP: (!) 167/47   Pulse: 92   Resp: 17   Temp: 98.2 °F (36.8 °C)   SpO2: 99%      Review of Systems   Unable to perform ROS: Dementia   Constitutional: Positive for appetite change. Negative for fever. HENT: Positive for hearing loss. Negative for trouble swallowing. Respiratory: Negative for cough and wheezing. Cardiovascular: Negative for leg swelling. Gastrointestinal: Negative for vomiting. Skin: Negative for color change and rash. Neurological: Positive for weakness (  Generalized).  Negative for tremors, seizures, syncope, facial asymmetry and speech difficulty. Psychiatric/Behavioral: Positive for agitation, behavioral problems, confusion and sleep disturbance. The patient is not nervous/anxious. Physical Exam  Vitals signs and nursing note reviewed. Constitutional:       General: She is sleeping. She is not in acute distress. Appearance: She is not diaphoretic. Interventions: She is sedated. HENT:      Head: Normocephalic and atraumatic. Eyes:      Pupils: Pupils are equal, round, and reactive to light. Cardiovascular:      Rate and Rhythm: Normal rate. Rhythm irregular. Pulmonary:      Effort: Pulmonary effort is normal. No respiratory distress. Breath sounds: Normal breath sounds. Abdominal:      General: Bowel sounds are normal.      Palpations: Abdomen is soft. Skin:     General: Skin is warm and dry. Neurological:      Mental Status: She is disoriented. Motor: No tremor or seizure activity.       Comments: Patient unable to cooperate in neuro exam due to sedation         Pt is sedate due to ativan      Medications:  Reviewed    Infusion Medications:    dextrose       Scheduled Medications:    metoprolol tartrate  25 mg Oral BID    mirtazapine  7.5 mg Oral Nightly    sodium chloride flush  10 mL Intravenous 2 times per day    insulin lispro  0-12 Units Subcutaneous TID WC    insulin lispro  0-6 Units Subcutaneous Nightly     PRN Meds: metoprolol, haloperidol lactate, sodium chloride flush, promethazine **OR** ondansetron, polyethylene glycol, acetaminophen **OR** acetaminophen, glucose, dextrose, glucagon (rDNA), dextrose    Labs:   Recent Labs     03/06/21 0622 03/07/21  0551 03/08/21  0551   WBC 9.4 9.8 9.3   HGB 10.8* 11.5* 11.2*   HCT 33.0* 34.7* 34.1*   * 467* 379     Recent Labs     03/06/21  0622 03/07/21  0551 03/08/21  0551    136 137   K 3.8 3.7 3.6    102 101   CO2 22 22 22   BUN 11 14 19   CREATININE 0.63 0.65 0.60   CALCIUM 9.1 9.1 8.6     Recent Labs     03/06/21 0622 03/07/21  0551 03/08/21  0551   AST 17 12 16   ALT 10 9 8   BILITOT 0.5 0.6 0.7   ALKPHOS 103 112 108     No results for input(s): INR in the last 72 hours. No results for input(s): Belinda Older in the last 72 hours. Urinalysis:   Lab Results   Component Value Date    NITRU Negative 03/04/2021    WBCUA 0-2 03/04/2021    BACTERIA Negative 03/04/2021    RBCUA 0-2 03/04/2021    BLOODU Negative 03/04/2021    SPECGRAV 1.015 03/04/2021    GLUCOSEU Negative 03/04/2021       Radiology:   Most recent    EEG No procedure found. MRI of Brain No results found for this or any previous visit. Results for orders placed during the hospital encounter of 03/04/21   MRI BRAIN WO CONTRAST    Narrative EXAMINATION: MRI BRAIN WO CONTRAST     CLINICAL HISTORY:  stroke     COMPARISONS: Brain MRI from August 20, 2015    TECHNIQUE:    Multiplanar multisequence images of the brain were obtained without contrast. Diffusion perfusion imaging was obtained. FINDINGS:      There are no extra-axial collections. There is no evidence of hemorrhage. There are no areas of perfusion diffusion signal abnormality to suggest ischemia. The susceptibility images do not demonstrate evidence of hemosiderin deposition within the brain   parenchyma or the leptomeninges. There is preservation of the gray-white matter differentiation. There are numerous foci of T2/FLAIR hyperintensities in the subcortical and periventricular white matter in a symmetric distribution throughout both hemispheres. There is prominence of the   sulci and ventricles consistent with moderate global cerebral atrophy and chronic involutional changes. There are dilated perivascular spaces versus lacunar infarcts in the bilateral basal ganglia. The midline structures are intact, the corpus callosum is within normal limits. The region of the pineal gland and the sella turcica are unremarkable. There are no space-occupying lesions in the posterior fossa.  The basilar cisterns are patent. The craniocervical junction is unremarkable. The visualized portions of the orbits are within normal limits, the globes are intact. The visualized portions of the paranasal sinuses are within normal limits. The calvarium and soft tissues are unremarkable. Impression    There are no acute intracranial changes, there is no evidence of hemorrhage or acute ischemia. MRA of the Head and Neck: No results found for this or any previous visit. No results found for this or any previous visit. No results found for this or any previous visit. CT of the Head:   Results for orders placed during the hospital encounter of 03/04/21   CT Head WO Contrast    Narrative CT Brain. Contrast medium:  without contrast.. History:  Confusion. Technical factors: CT imaging of the brain was obtained and formatted as 5 mm contiguous axial images. 2.5 mm contiguous axial images were obtained through the osseous structures. Sagittal and coronal reconstruction obtained during postprocessing. Comparison:  CT brain, September 16, 2020 MRI/MRA brain,  August 28, 2015. Findings:    Study limited secondary to motion artifact. Extra-axial spaces:  Normal.     Intracranial hemorrhage:  None. Ventricular system: Ventricles mildly enlarged. Sulci mildly prominent. Basal Cisterns:  Normal.    Cerebral Parenchyma: Bilateral symmetric periventricular areas decreased attenuation    Midline Shift:  None. Cerebellum:  Normal.     Paranasal sinuses and mastoid air cells:  Normal.    Visualized Orbits:  Normal.        Impression Impression:    Study limited secondary to motion artifact. No acute findings. Mild cerebral atrophy. Chronic ischemic white matter disease.       All CT scans at this facility use dose modulation, iterative reconstruction, and/or weight based dosing when appropriate to reduce radiation dose to as low as reasonably achievable. No results found for this or any previous visit. No results found for this or any previous visit. Carotid duplex: No results found for this or any previous visit. No results found for this or any previous visit. No results found for this or any previous visit. Echo No results found for this or any previous visit. Assessment/Plan:    Dementia with worsening with the possibility of underlying possible strokes which can occur in atrial fibrillation patient is not on anticoagulation patient has been falling. We will try and obtain an MRI of the brain if he can as she appears to be somewhat agitated we will perform this under sedation. Patient has gait ataxia with frontal gait disorder with significant dementia. I am awaiting her daughter for more history. Otherwise patient has a nonfocal examination but there may be some right-sided findings. Dementia with behavioral disturbances  Remeron has been initiated to help with sleep and appetite  New onset atrial fibrillation, oral anticoagulation not recommended due to patient's falls risk  Recurrent falls with multiple rib fractures  Right pleural effusion, IR consulted  MRI of the brain negative for any acute intracranial findings  Check B12 and folate  Palliative care on consult  Patient is now DNR CCA  Will follow    I have personally performed a face to face diagnostic evaluation on this patient, reviewed all data and investigations, and am the sole provider of all clinical decisions on the neurological status of this patient. pt sedate due to ativan  Will reexamine in am, mri seen, no new infarcts      Alessandro Valdivia MD, [id number], American Board of Psychiatry & Neurology  Board Certified in Vascular Neurology  Board Certified in Neuromuscular Medicine  Certified in Neurorehabilitation     Collaborating physicians: Dr Shelby Valdivia    Electronically signed by Burnie Kehr, APRN - CNP on 3/8/2021 at 1:09 PM

## 2021-03-09 LAB
ALBUMIN SERPL-MCNC: 3 G/DL (ref 3.5–4.6)
ALP BLD-CCNC: 122 U/L (ref 40–130)
ALT SERPL-CCNC: 13 U/L (ref 0–33)
ANION GAP SERPL CALCULATED.3IONS-SCNC: 18 MEQ/L (ref 9–15)
AST SERPL-CCNC: 27 U/L (ref 0–35)
BASOPHILS ABSOLUTE: 0 K/UL (ref 0–0.2)
BASOPHILS RELATIVE PERCENT: 0.4 %
BILIRUB SERPL-MCNC: 0.8 MG/DL (ref 0.2–0.7)
BUN BLDV-MCNC: 21 MG/DL (ref 8–23)
CALCIUM SERPL-MCNC: 8.8 MG/DL (ref 8.5–9.9)
CHLORIDE BLD-SCNC: 102 MEQ/L (ref 95–107)
CO2: 20 MEQ/L (ref 20–31)
CREAT SERPL-MCNC: 0.64 MG/DL (ref 0.5–0.9)
EOSINOPHILS ABSOLUTE: 0.3 K/UL (ref 0–0.7)
EOSINOPHILS RELATIVE PERCENT: 2.9 %
GFR AFRICAN AMERICAN: >60
GFR NON-AFRICAN AMERICAN: >60
GLOBULIN: 3.9 G/DL (ref 2.3–3.5)
GLUCOSE BLD-MCNC: 126 MG/DL (ref 60–115)
GLUCOSE BLD-MCNC: 127 MG/DL (ref 60–115)
GLUCOSE BLD-MCNC: 135 MG/DL (ref 60–115)
GLUCOSE BLD-MCNC: 138 MG/DL (ref 60–115)
GLUCOSE BLD-MCNC: 138 MG/DL (ref 70–99)
HCT VFR BLD CALC: 34.1 % (ref 37–47)
HEMOGLOBIN: 10.9 G/DL (ref 12–16)
LYMPHOCYTES ABSOLUTE: 0.7 K/UL (ref 1–4.8)
LYMPHOCYTES RELATIVE PERCENT: 7.8 %
MAGNESIUM: 2 MG/DL (ref 1.7–2.4)
MCH RBC QN AUTO: 29.9 PG (ref 27–31.3)
MCHC RBC AUTO-ENTMCNC: 32.1 % (ref 33–37)
MCV RBC AUTO: 93.1 FL (ref 82–100)
MONOCYTES ABSOLUTE: 0.8 K/UL (ref 0.2–0.8)
MONOCYTES RELATIVE PERCENT: 8.4 %
NEUTROPHILS ABSOLUTE: 7.5 K/UL (ref 1.4–6.5)
NEUTROPHILS RELATIVE PERCENT: 80.5 %
PDW BLD-RTO: 14.8 % (ref 11.5–14.5)
PERFORMED ON: ABNORMAL
PLATELET # BLD: 397 K/UL (ref 130–400)
POTASSIUM REFLEX MAGNESIUM: 3.5 MEQ/L (ref 3.4–4.9)
RBC # BLD: 3.66 M/UL (ref 4.2–5.4)
SODIUM BLD-SCNC: 140 MEQ/L (ref 135–144)
TOTAL PROTEIN: 6.9 G/DL (ref 6.3–8)
WBC # BLD: 9.4 K/UL (ref 4.8–10.8)

## 2021-03-09 PROCEDURE — 83735 ASSAY OF MAGNESIUM: CPT

## 2021-03-09 PROCEDURE — 2580000003 HC RX 258: Performed by: NURSE PRACTITIONER

## 2021-03-09 PROCEDURE — 2060000000 HC ICU INTERMEDIATE R&B

## 2021-03-09 PROCEDURE — APPSS15 APP SPLIT SHARED TIME 0-15 MINUTES: Performed by: NURSE PRACTITIONER

## 2021-03-09 PROCEDURE — 85025 COMPLETE CBC W/AUTO DIFF WBC: CPT

## 2021-03-09 PROCEDURE — 36415 COLL VENOUS BLD VENIPUNCTURE: CPT

## 2021-03-09 PROCEDURE — 99233 SBSQ HOSP IP/OBS HIGH 50: CPT | Performed by: PSYCHIATRY & NEUROLOGY

## 2021-03-09 PROCEDURE — 92610 EVALUATE SWALLOWING FUNCTION: CPT

## 2021-03-09 PROCEDURE — 99232 SBSQ HOSP IP/OBS MODERATE 35: CPT | Performed by: INTERNAL MEDICINE

## 2021-03-09 PROCEDURE — 99231 SBSQ HOSP IP/OBS SF/LOW 25: CPT | Performed by: NURSE PRACTITIONER

## 2021-03-09 PROCEDURE — 80053 COMPREHEN METABOLIC PANEL: CPT

## 2021-03-09 RX ADMIN — Medication 10 ML: at 22:03

## 2021-03-09 RX ADMIN — Medication 10 ML: at 08:25

## 2021-03-09 ASSESSMENT — PAIN SCALES - GENERAL
PAINLEVEL_OUTOF10: 0
PAINLEVEL_OUTOF10: 0

## 2021-03-09 ASSESSMENT — ENCOUNTER SYMPTOMS
COLOR CHANGE: 0
TROUBLE SWALLOWING: 0
COUGH: 0
WHEEZING: 0

## 2021-03-09 NOTE — PROGRESS NOTES
Hospitalist Daily Progress Note  Name: Alpa Teixeira  Age: 80 y.o. Gender: female  CodeStatus: DNR-CCA  Allergies: No Known Allergies    Chief Complaint:Emesis (nauseated was given 4 mg of zofran and 500 ml of NS )    Primary Care Provider: Martha العراقي MD  InpatientTreatment Team: Treatment Team: Attending Provider: Rossy Lopez MD; Consulting Physician: Shelly Crum MD; Consulting Physician: Klarissa Zamudio MD; Utilization Reviewer: Raul Otto, RIGO; Consulting Physician: Jerrod Guerrero MD; Consulting Physician: Renan Ellis MD; : IRWIN Lee; : Raz Ann, RIGO; Utilization Reviewer: Alexandrea Cary RN; Registered Nurse: Anton Ramirez RN  Admission Date: 3/4/2021      Subjective: Patient sleepy, rousable but not alert to questions. Physical Exam  Constitutional:       Comments: Thin and cachectic. Agitated, confused   HENT:      Head: Normocephalic and atraumatic. Mouth/Throat:      Mouth: Mucous membranes are moist.      Pharynx: Oropharynx is clear. Eyes:      Conjunctiva/sclera: Conjunctivae normal.      Pupils: Pupils are equal, round, and reactive to light. Cardiovascular:      Rate and Rhythm: Tachycardia present. Rhythm irregular. Heart sounds: Murmur present. No friction rub. No gallop. Comments: BEST 2 out of 6  Pulmonary:      Breath sounds: Rales present. No wheezing or rhonchi. Abdominal:      General: There is no distension. Tenderness: There is no abdominal tenderness. There is no guarding. Musculoskeletal: Normal range of motion. Right lower leg: No edema. Left lower leg: No edema. Neurological:      Mental Status: She is alert. Comments: Oriented only to self. Psychiatric:      Comments: Odd mood and affect, very hard of hearing, but poor insight. Review of Systems  Reliable ROS not able to be obtained due to confusion.    Medications:  Reviewed    Infusion Medications:    dextrose Scheduled Medications:    metoprolol tartrate  25 mg Oral BID    mirtazapine  7.5 mg Oral Nightly    sodium chloride flush  10 mL Intravenous 2 times per day    insulin lispro  0-12 Units Subcutaneous TID     insulin lispro  0-6 Units Subcutaneous Nightly     PRN Meds: metoprolol, haloperidol lactate, sodium chloride flush, promethazine **OR** ondansetron, polyethylene glycol, acetaminophen **OR** acetaminophen, glucose, dextrose, glucagon (rDNA), dextrose    Labs:   Recent Labs     03/06/21 0622 03/07/21 0551 03/08/21  0551   WBC 9.4 9.8 9.3   HGB 10.8* 11.5* 11.2*   HCT 33.0* 34.7* 34.1*   * 467* 379     Recent Labs     03/06/21 0622 03/07/21 0551 03/08/21  0551    136 137   K 3.8 3.7 3.6    102 101   CO2 22 22 22   BUN 11 14 19   CREATININE 0.63 0.65 0.60   CALCIUM 9.1 9.1 8.6     Recent Labs     03/06/21 0622 03/07/21 0551 03/08/21  0551   AST 17 12 16   ALT 10 9 8   BILITOT 0.5 0.6 0.7   ALKPHOS 103 112 108     No results for input(s): INR in the last 72 hours. No results for input(s): Belinda Older in the last 72 hours. Urinalysis:   Lab Results   Component Value Date    NITRU Negative 03/04/2021    WBCUA 0-2 03/04/2021    BACTERIA Negative 03/04/2021    RBCUA 0-2 03/04/2021    BLOODU Negative 03/04/2021    SPECGRAV 1.015 03/04/2021    GLUCOSEU Negative 03/04/2021       Radiology:   Most recent    Chest CT      WITH CONTRAST:No results found for this or any previous visit. WITHOUT CONTRAST: No results found for this or any previous visit. CXR      2-view:   Results for orders placed during the hospital encounter of 09/29/18   XR CHEST STANDARD (2 VW)    Narrative EXAMINATION: XR CHEST (2 VW)    CLINICAL HISTORY: SYNCOPE    COMPARISONS: AUGUST 20 17,015    FINDINGS: Osteopenia. Remote fracture healed right humeral neck. Patient leaning to right. Cardiopericardial silhouette normal. Aorta tortuous and calcified. Lungs clear.       Impression NO ACUTE CARDIOPULMONARY DISEASE        Portable:   Results for orders placed during the hospital encounter of 09/16/20   XR CHEST PORTABLE    Narrative EXAMINATION: CHEST PORTABLE VIEW     CLINICAL HISTORY: Midsternal chest pain    COMPARISONS: May 3, 2019     FINDINGS:    Single  views of the chest is submitted. The cardiac silhouette is enlarged. Pulmonary vascular attenuated  Right sided trachea. No focal infiltrates. No Pneumothoraces. Probable old healed fracture right humeral head with associated severe degenerative joint disease                                                                                    Impression NO ACUTE ACTIVE CARDIOPULMONARY PROCESS       Echo No results found for this or any previous visit. Assessment/Plan:    Active Hospital Problems    Diagnosis Date Noted    Multiple closed fractures of ribs of right side [S22.41XA]     New onset a-fib ([facility] [I48.91] 03/04/2021    Nausea & vomiting [R11.2] 03/04/2021    Falls [W19. XXXA] 03/04/2021    Weakness [R53.1] 03/04/2021    Hyponatremia [E87.1] 03/04/2021    Pleural effusion, right [J90] 03/04/2021    Dementia ([facility] [F03.90]     Type 2 diabetes mellitus with complication ([facility] [P56.7] 12/16/2015    CAD (coronary artery disease) [I25.10]      Atrial Fibrillation with RVR: hold anticoagulation for possible procedure inpt. Poor candidate for chronic anticoagulation given frequent falls. Continue beta blocker. Cardiology following. Right pleural effusion: Consult to IR for thoracentesis. Consult pulmonary for recommendations regarding attempting any procedures given her advanced age and poor functional status. . Lateral cxr imaging shows loculated effusion    Dementia with confusion: daughter notes dramatic worsening compared to baseline. Ct head negative. No infectious cause.  Consult to neuro per family request.  Given the patient's relatively advanced dementia with poor functional status the patient does have a very poor prognosis. This is left with family to discuss further plans of care   3/8 - d/w daughter by phone. Arranging hospice meeting today/tomorrow when able. Falls with multiple rib fracture: PT/OT evaluation. se Remeron before bed, haldol 1mg IM PRN for agitation. DVT PPX held for possible thoracentesis. Additional work up or/and treatment plan may be added today or then after based on clinical progression. I am managing a portion of pt care. Some medical issues are handled byother specialists. Additional work up and treatment should be done in out pt setting by pt PCP and other out pt providers. In addition to examining and evaluating pt, I spent additional time explaining care, normaland abnormal findings, and treatment plan. All of pt questions were answered. Counseling, diet and education were provided. Case will be discussed with nursing staff when appropriate. Family will be updated if and whenappropriate.       Electronically signed by Ok Cosby MD on 3/9/2021 at 1:49 AM

## 2021-03-09 NOTE — PROGRESS NOTES
organizations: None     Relationship status: None    Intimate partner violence     Fear of current or ex partner: None     Emotionally abused: None     Physically abused: None     Forced sexual activity: None   Other Topics Concern    None   Social History Narrative    None       Subjective/HPI Pt is some confused. Talking randomly. She is not hungry. She does not knw where she is. EKG:SR         Review of Systems:   Review of Systems  Not reliable    Physical Examination:    BP (!) 164/43   Pulse 88   Temp 100.3 °F (37.9 °C) (Axillary)   Resp 17   Ht 5' (1.524 m)   Wt 101 lb 12.8 oz (46.2 kg)   LMP  (LMP Unknown)   SpO2 95%   BMI 19.88 kg/m²    Physical Exam   Constitutional: She appears healthy. No distress. HENT:   Normal cephalic and Atraumatic   Eyes: Pupils are equal, round, and reactive to light. Neck: Normal range of motion and thyroid normal. Neck supple. No JVD present. No neck adenopathy. No thyromegaly present. Cardiovascular: Normal rate, regular rhythm, intact distal pulses and normal pulses. Murmur heard. Pulmonary/Chest: Effort normal and breath sounds normal. She has no wheezes. She has no rales. She exhibits no tenderness. Abdominal: Soft. Bowel sounds are normal. There is no abdominal tenderness. Musculoskeletal: Normal range of motion. General: No tenderness or edema. Neurological: She exhibits altered mental status. Skin: Skin is warm. No cyanosis. Nails show no clubbing.        LABS:  CBC:   Lab Results   Component Value Date    WBC 9.4 03/09/2021    RBC 3.66 03/09/2021    RBC 4.05 11/07/2011    HGB 10.9 03/09/2021    HCT 34.1 03/09/2021    MCV 93.1 03/09/2021    MCH 29.9 03/09/2021    MCHC 32.1 03/09/2021    RDW 14.8 03/09/2021     03/09/2021    MPV 8.4 09/01/2015     CBC with Differential:    Lab Results   Component Value Date    WBC 9.4 03/09/2021    RBC 3.66 03/09/2021    RBC 4.05 11/07/2011    HGB 10.9 03/09/2021    HCT 34.1 03/09/2021    PLT 397 03/09/2021    MCV 93.1 03/09/2021    MCH 29.9 03/09/2021    MCHC 32.1 03/09/2021    RDW 14.8 03/09/2021    BANDSPCT 5 03/10/2015    LYMPHOPCT 7.8 03/09/2021    MONOPCT 8.4 03/09/2021    EOSPCT 20.5 11/07/2011    BASOPCT 0.4 03/09/2021    MONOSABS 0.8 03/09/2021    LYMPHSABS 0.7 03/09/2021    EOSABS 0.3 03/09/2021    BASOSABS 0.0 03/09/2021     CMP:    Lab Results   Component Value Date     03/09/2021    K 3.5 03/09/2021     03/09/2021    CO2 20 03/09/2021    BUN 21 03/09/2021    CREATININE 0.64 03/09/2021    GFRAA >60.0 03/09/2021    LABGLOM >60.0 03/09/2021    GLUCOSE 138 03/09/2021    GLUCOSE 121 11/07/2011    PROT 6.9 03/09/2021    LABALBU 3.0 03/09/2021    LABALBU 4.2 11/07/2011    CALCIUM 8.8 03/09/2021    BILITOT 0.8 03/09/2021    ALKPHOS 122 03/09/2021    AST 27 03/09/2021    ALT 13 03/09/2021     BMP:    Lab Results   Component Value Date     03/09/2021    K 3.5 03/09/2021     03/09/2021    CO2 20 03/09/2021    BUN 21 03/09/2021    LABALBU 3.0 03/09/2021    LABALBU 4.2 11/07/2011    CREATININE 0.64 03/09/2021    CALCIUM 8.8 03/09/2021    GFRAA >60.0 03/09/2021    LABGLOM >60.0 03/09/2021    GLUCOSE 138 03/09/2021    GLUCOSE 121 11/07/2011     Magnesium:    Lab Results   Component Value Date    MG 2.0 03/09/2021     Troponin:    Lab Results   Component Value Date    TROPONINI <0.010 03/04/2021        Active Hospital Problems    Diagnosis Date Noted    Multiple closed fractures of ribs of right side [S22.41XA]      Priority: Low    New onset a-fib ([facility] [I48.91] 03/04/2021     Priority: Low    Nausea & vomiting [R11.2] 03/04/2021     Priority: Low    Falls [W19. XXXA] 03/04/2021     Priority: Low    Weakness [R53.1] 03/04/2021     Priority: Low    Hyponatremia [E87.1] 03/04/2021     Priority: Low    Pleural effusion, right [J90] 03/04/2021     Priority: Low    Dementia ([facility] [F03.90]      Priority: Low    Type 2 diabetes mellitus with complication ([facility] [M87.1] 12/16/2015 Priority: Low    CAD (coronary artery disease) [I25.10]      Priority: Low        Assessment/Plan:  1. AF- rate control w BB. OK for Lovenox while here but do not recommend NOAC due to increased risks. 2. HTN - uncontrolled. She is not consistently taking her PO BB.   3. Advanced Dementia. 4. Echo LVEF normal   5.  DNR CCA       Electronically signed by José Miguel Sosa MD on 3/9/2021 at 8:48 AM

## 2021-03-09 NOTE — FLOWSHEET NOTE
Attempted to feed pt breakfast. Pt held food in mouth and would not swallow. Pt tried to bite pca as she fed her.      Prudence Noun Dr Quinn Long to let him know that pt not swallowing food and needs swallow eval    1010- Daughter called in with HIPPA code and updated    1620- Son at bedside

## 2021-03-09 NOTE — PROGRESS NOTES
INPATIENT PROGRESS NOTES    PATIENT NAME: Kenya Doty  MRN: [id number]  SERVICE DATE:  March 9, 2021   SERVICE TIME:  1:37 PM      PRIMARY SERVICE: Pulmonary Disease    CHIEF COMPLAIN: Right pleural effusion      INTERVAL HPI: Patient seen and examined at bedside, Interval Notes, orders reviewed. Nursing notes noted  Patient is sleepy but easily arousable. Her CODE STATUS changed to DNR CCA. She is not having short of breath and she is comfortable in bed. She is on room air and O2 saturation 95%     OBJECTIVE    Body mass index is 19.88 kg/m². PHYSICAL EXAM:  Vitals:  BP (!) 164/43   Pulse 88   Temp 100.3 °F (37.9 °C) (Axillary)   Resp 17   Ht 5' (1.524 m)   Wt 101 lb 12.8 oz (46.2 kg)   LMP  (LMP Unknown)   SpO2 95%   BMI 19.88 kg/m²   General: Alert, awake . comfortable in bed, No distress. Head: Atraumatic , Normocephalic   Eyes: PERRL. No sclera icterus. No conjunctival injection. No discharge   ENT: No nasal  discharge. Pharynx clear. Neck:  Trachea midline. No thyromegaly, no JVD, No cervical adenopathy. Chest : Bilaterally symmetrical ,Normal effort,  No accessory muscle use  Lung : Diminished breath sound at right base. No Rales. No wheezing. No rhonchi. Heart[de-identified] Normal  rate. Regular rhythm. No mumur ,  Rub or gallop  ABD: Non-tender. Non-distended. No masses. No organmegaly. Normal bowel sounds. No hernia.   Ext : No Pitting both leg , No Cyanosis No clubbing  Neuro: no focal weakness          DATA:   Recent Labs     03/08/21  0551 03/09/21  0549   WBC 9.3 9.4   HGB 11.2* 10.9*   HCT 34.1* 34.1*   MCV 93.4 93.1    397     Recent Labs     03/08/21  0551 03/09/21  0549    140   K 3.6 3.5    102   CO2 22 20   BUN 19 21   CREATININE 0.60 0.64   GLUCOSE 127* 138*   CALCIUM 8.6 8.8   PROT 6.6 6.9   LABALBU 2.9* 3.0*   BILITOT 0.7 0.8*   ALKPHOS 108 122   AST 16 27   ALT 8 13   LABGLOM >60.0 >60.0   GFRAA >60.0 >60.0   GLOB 3.7* 3.9*       MV Settings:          No results for input(s): PHART, OHJ4ROU, PO2ART, LJJ9FVE, BEART, M4RESQPF in the last 72 hours. O2 Device: None (Room air)  O2 Flow Rate (L/min): 0 L/min    DIET GENERAL;     MEDICATIONS during current hospitalization:    Continuous Infusions:   dextrose         Scheduled Meds:   metoprolol tartrate  25 mg Oral BID    mirtazapine  7.5 mg Oral Nightly    sodium chloride flush  10 mL Intravenous 2 times per day    insulin lispro  0-12 Units Subcutaneous TID WC    insulin lispro  0-6 Units Subcutaneous Nightly       PRN Meds:metoprolol, haloperidol lactate, sodium chloride flush, promethazine **OR** ondansetron, polyethylene glycol, acetaminophen **OR** acetaminophen, glucose, dextrose, glucagon (rDNA), dextrose    Radiology  Echocardiogram Complete 2d With Doppler With Color    Result Date: 3/5/2021  Transthoracic Echocardiography Report (TTE)  Demographics  Patient Name    Malorie Bella Gender                Female  Patient Number  [id number]      Race                                                  Ethnicity  Visit Number    [id number]     Room Number           W180  Corporate ID                  Date of Study         03/05/2021  Accession       [id number]    Referring Physician  Number  Date of Birth   02/10/1928    Sonographer           Yady Miguel [facility]  Age             80 year(s)    Interpreting          [facility])                                Physician             Cardiology                                                      [address] [place] [address] Procedure Type of Study  TTE procedure:ECHO COMPLETE 2D W/DOP W/COLOR. Procedure Date Date: 03/05/2021 Start: 09:56 AM Study Location: Portable Technical Quality: Adequate visualization Indications:Atrial fibrillation. Patient Status: Routine Height: 60 inches Weight: 145 pounds BSA: 1.63 m^2 BMI: 28.32 kg/m^2 BP: 153/47 mmHg Allergies   - No known allergies. Conclusions  Summary  Aortic valve leaflets are mildly thickened. Trivial aortic regurgitation is noted. Normal tricuspid valve structure and function. There is mild ( 1 +) tricuspid regurgitation with estimated RVSP of 31 mm  Hg. Mildly dilated left atrium. Normal left ventricle structure and function. Impaired relaxation compatible with diastolic dysfunction. ( reversed E/A  ratio)  Moderate concentric left ventricular hypertrophy. Signature  ----------------------------------------------------------------  Electronically signed by Amara Hutchinson(Interpreting physician)  on 03/05/2021 01:28 PM  ----------------------------------------------------------------  Findings Left Ventricle Normal left ventricle structure and function. Impaired relaxation compatible with diastolic dysfunction. ( reversed E/A ratio) Moderate concentric left ventricular hypertrophy. Right Ventricle Normal right ventricle structure and function. Left Atrium Mildly dilated left atrium. Right Atrium Normal right atrium. Mitral Valve Mitral valve leaflets are mildly thickened with preserved leaflet mobility. Tricuspid Valve Normal tricuspid valve structure and function. There is mild ( 1 +) tricuspid regurgitation with estimated RVSP of 31 mm Hg. Aortic Valve Aortic valve leaflets are mildly thickened. Trivial aortic regurgitation is noted. Pulmonic Valve Normal pulmonic valve structure and function. Pericardial Effusion No evidence of pericardial effusion. Pleural Effusion No evidence of pleural effusion. Aorta \ Miscellaneous Miscellaneous normal findings were found. M-Mode Measurements (cm)  LVIDd: 4.19 cm                         LVIDs: 2.64 cm  IVSd: 1.07 cm                          IVSs: 1.29 cm  LVPWd: 1.01 cm                         LVPWs: 1.33 cm  Rt. Vent.  Dimension: 3.26 cm           AO Root Dimension: 3.32 cm                                         ACS: 1.48 cm                                         LA: 3.12 cm Doppler Measurements:  AV Peak Gradient: 12.22 mmHg           MV Peak E-Wave: 0.68 m/s  AV Mean Gradient: 7.6 mmHg MV Peak A-Wave: 1.05 m/s  TR Velocity:2.67 m/s  TR Gradient:28.61 mmHg                                         Estimated RAP:3 mmHg                                         RVSP:31.61 mmHg Valves  Mitral Valve  Peak E-Wave: 0.68 m/s                 Peak A-Wave: 1.05 m/s                                        E/A Ratio: 0.65                                        Peak Gradient: 1.85 mmHg                                        Deceleration Time: 260.1 msec  Tissue Doppler  E' Septal Velocity: 0.06 m/s  E' Lateral Velocity: 0.05 m/s  Aortic Valve  Peak Velocity: 1.75 m/s               Mean Velocity: 1.33 m/s  Peak Gradient: 12.22 mmHg             Mean Gradient: 7.6 mmHg  AV VTI: 32.27 cm  Cusp Separation: 1.48 cm  Tricuspid Valve  Estimated RVSP: 31.61 mmHg              Estimated RAP: 3 mmHg  TR Velocity: 2.67 m/s                   TR Gradient: 28.61 mmHg  Pulmonic Valve  Peak Velocity: 1.12 m/s           Peak Gradient: 5.02 mmHg                                    Estimated PASP: 31.61 mmHg  LVOT  Peak Velocity: 0.94 m/s              Mean Velocity: 0.6 m/s  Peak Gradient: 2.88 mmHg             Mean Gradient: 1.62 mmHg                                       LVOT VTI: 17.21 cm Structures  Left Atrium  LA Dimension: 3.12 cm                        LA Area: 14.08 cm^2  LA/Aorta: 0.94  LA Volume/Index: 37.82 ml /23 m^2  Left Ventricle  Diastolic Dimension: 6.26 cm        Systolic Dimension: 5.25 cm  Septum Diastolic: 3.82 cm           Septum Systolic: 6.45 cm  PW Diastolic: 7.05 cm               PW Systolic: 2.41 cm                                      FS: 37 %  LV EDV/LV EDV Index: 78 ml/48 m^2   LV ESV/LV ESV Index: 25.5 ml/16 m^2  EF Calculated: 67.3 %               LV Length: 5.44 cm  Right Atrium  RA Systolic Pressure: 3 mmHg  Right Ventricle  Diastolic Dimension: 1.51 cm                                    RV Systolic Pressure: 18.28 mmHg Aorta/ Miscellaneous Aorta  Aortic Root: 3.32 cm    Xr Acute Abd Series Chest 1 Vw    Result Date: 3/5/2021  XR ACUTE ABD SERIES CHEST 1 VW : 3/4/2021 CLINICAL HISTORY:  nausea and vomiting . COMPARISON: Portable chest 9/16/2020 and CT abdomen pelvis 2/15/2011. TECHNIQUE: An upright PA radiograph of the chest, and upright and supine radiographs of the abdomen and pelvis were obtained. FINDINGS: Several acute-appearing right mid to lower posterolateral rib fractures are noted, with a moderate-sized right pleural effusion and probable compressive right basilar atelectasis. There is no pneumothorax, significant left pleural effusion, cardiomegaly, or other acute fractures identified. Chronic healed deformity of the proximal right humerus, degenerative changes and mild rotary levoscoliosis of the lumbar spine appear unchanged. There is a normal bowel gas pattern. SEVERAL ACUTE RIGHT MID TO LOWER POSTEROLATERAL RIGHT RIB FRACTURES. MODERATE-SIZED RIGHT PLEURAL EFFUSION AND PROBABLE COMPRESSIVE ATELECTASIS. Ct Head Wo Contrast    Result Date: 3/4/2021  CT Brain. Contrast medium:  without contrast.. History:  Confusion. Technical factors: CT imaging of the brain was obtained and formatted as 5 mm contiguous axial images. 2.5 mm contiguous axial images were obtained through the osseous structures. Sagittal and coronal reconstruction obtained during postprocessing. Comparison:  CT brain, September 16, 2020 MRI/MRA brain,  August 28, 2015. Findings: Study limited secondary to motion artifact. Extra-axial spaces:  Normal. Intracranial hemorrhage:  None. Ventricular system: Ventricles mildly enlarged. Sulci mildly prominent. Basal Cisterns:  Normal. Cerebral Parenchyma: Bilateral symmetric periventricular areas decreased attenuation Midline Shift:  None. Cerebellum:  Normal. Paranasal sinuses and mastoid air cells:  Normal. Visualized Orbits:  Normal.     Impression: Study limited secondary to motion artifact. No acute findings. Mild cerebral atrophy. Chronic ischemic white matter disease.  All CT scans at this facility use dose modulation, iterative reconstruction, and/or weight based dosing when appropriate to reduce radiation dose to as low as reasonably achievable. Xr Chest Decubitus Right    Result Date: 3/6/2021  EXAMINATION:  XR CHEST DECUBITUS LEFT, XR CHEST DECUBITUS RIGHT CLINICAL HISTORY:  pleural effusion . StudyTube COMPARISONS:  NONE AVAILABLE TECHNIQUE:  Bilateral decubitus films of the chest. 2 views are submitted. FINDINGS:  There is a right pleural effusion probably loculated as it does not appear to be free flowing. No left-sided pleural effusion. The field-of-view there is fractures of the lateral aspect of the right seventh eighth ninth and 10th ribs. RIGHT-SIDED PLEURAL EFFUSION WHICH IS PROBABLY LOCULATED AS IT IS NOT FREELY FLOWING. Xr Chest Decubitus Left    Result Date: 3/6/2021  EXAMINATION:  XR CHEST DECUBITUS LEFT, XR CHEST DECUBITUS RIGHT CLINICAL HISTORY:  pleural effusion . StudyTube COMPARISONS:  NONE AVAILABLE TECHNIQUE:  Bilateral decubitus films of the chest. 2 views are submitted. FINDINGS:  There is a right pleural effusion probably loculated as it does not appear to be free flowing. No left-sided pleural effusion. The field-of-view there is fractures of the lateral aspect of the right seventh eighth ninth and 10th ribs. RIGHT-SIDED PLEURAL EFFUSION WHICH IS PROBABLY LOCULATED AS IT IS NOT FREELY FLOWING. IMPRESSION AND SUGGESTION:  1. Right pleural effusion, loculated. 2. History of fall with multiple right rib fracture. 3. New onset A. Fib  4. Coronary  artery disease status post stent  5. Dementia    Patient had loculated right-sided pleural effusion which could be due to after trauma and right-sided rib fracture and possibility of hemothorax. Discussed with RN. Patient family going to have a hospice meeting today. NOTE: This report was transcribed using voice recognition software.  Every effort was made to ensure accuracy; however, inadvertent computerized transcription errors may be present.       Electronically signed by Michael Adams MD, FCCP on 3/9/2021 at 1:37 PM

## 2021-03-09 NOTE — PROGRESS NOTES
[id number] [facility] Neurology Daily Progress Note  Name: Ramone Sender  Age: 80 y.o. Gender: female  CodeStatus: DNR-CCA  Allergies: No Known Allergies    Chief Complaint:Emesis (nauseated was given 4 mg of zofran and 500 ml of NS )    Primary Care Provider: Jj Russell MD  InpatientTreatment Team: Treatment Team: Attending Provider: Steve Lundborg, MD; Consulting Physician: John Williamson MD; Consulting Physician: Shelli Curiel MD; Utilization Reviewer: Chencho Hogan, RN; Consulting Physician: Yamile Landrum MD; Consulting Physician: Cassie Kocher, MD; Utilization Reviewer: Fuentes Sawyer, RN; : Vlad Angel RN; Patient Care Tech: Sheryl Garcia; : IRWIN Gibbons; Registered Nurse: Jeremy Lemons RN  Admission Date: 3/4/2021         Pt seen and examined for neuro follow up for dementia with behavioral disturbances in the setting of new onset atrial fibrillation and recurrent falls with multiple rib fractures. Confused at baseline but family reports that this is worse for her. Agitation somewhat improved and patient is now out of restraints. No obvious focal neuro deficits. No seizure activity reported. Appetite poor. Patient with dysphagia. NPO. Hospice to be consulted    No new changes    Vitals:    03/09/21 1501   BP: (!) 156/56   Pulse: 75   Resp: 17   Temp: 97.9 °F (36.6 °C)   SpO2: 95%      Review of Systems   Unable to perform ROS: Dementia   Constitutional: Positive for appetite change. Negative for fever. HENT: Positive for hearing loss. Negative for trouble swallowing. Respiratory: Negative for cough and wheezing. Cardiovascular: Negative for leg swelling. Skin: Negative for color change and rash. Neurological: Positive for weakness (  Generalized). Negative for tremors, seizures, syncope, facial asymmetry and speech difficulty. Psychiatric/Behavioral: Positive for agitation, behavioral problems, confusion and sleep disturbance.  The patient is not nervous/anxious. mild agitation  Physical Exam  Vitals signs and nursing note reviewed. Constitutional:       General: She is sleeping. She is not in acute distress. Appearance: She is not diaphoretic. Interventions: She is sedated. HENT:      Head: Normocephalic and atraumatic. Eyes:      Pupils: Pupils are equal, round, and reactive to light. Cardiovascular:      Rate and Rhythm: Normal rate. Rhythm irregular. Pulmonary:      Effort: Pulmonary effort is normal. No respiratory distress. Breath sounds: Normal breath sounds. Abdominal:      General: Bowel sounds are normal.      Palpations: Abdomen is soft. Skin:     General: Skin is warm and dry. Neurological:      General: No focal deficit present. Mental Status: She is disoriented. Motor: No tremor or seizure activity.       Comments: Patient unable to cooperate in neuro exam therefore it is limited           Medications:  Reviewed    Infusion Medications:    dextrose       Scheduled Medications:    metoprolol tartrate  25 mg Oral BID    mirtazapine  7.5 mg Oral Nightly    sodium chloride flush  10 mL Intravenous 2 times per day    insulin lispro  0-12 Units Subcutaneous TID WC    insulin lispro  0-6 Units Subcutaneous Nightly     PRN Meds: metoprolol, haloperidol lactate, sodium chloride flush, promethazine **OR** ondansetron, polyethylene glycol, acetaminophen **OR** acetaminophen, glucose, dextrose, glucagon (rDNA), dextrose    Labs:   Recent Labs     03/07/21  0551 03/08/21  0551 03/09/21  0549   WBC 9.8 9.3 9.4   HGB 11.5* 11.2* 10.9*   HCT 34.7* 34.1* 34.1*   * 379 397     Recent Labs     03/07/21  0551 03/08/21  0551 03/09/21  0549    137 140   K 3.7 3.6 3.5    101 102   CO2 22 22 20   BUN 14 19 21   CREATININE 0.65 0.60 0.64   CALCIUM 9.1 8.6 8.8     Recent Labs     03/07/21  0551 03/08/21  0551 03/09/21  0549   AST 12 16 27   ALT 9 8 13   BILITOT 0.6 0.7 0.8*   ALKPHOS 112 108 122     No results for input(s): INR in the last 72 hours. No results for input(s): Zoe Lacks in the last 72 hours. Urinalysis:   Lab Results   Component Value Date    NITRU Negative 03/04/2021    WBCUA 0-2 03/04/2021    BACTERIA Negative 03/04/2021    RBCUA 0-2 03/04/2021    BLOODU Negative 03/04/2021    SPECGRAV 1.015 03/04/2021    GLUCOSEU Negative 03/04/2021       Radiology:   Most recent    EEG No procedure found. MRI of Brain No results found for this or any previous visit. Results for orders placed during the hospital encounter of 03/04/21   MRI BRAIN WO CONTRAST    Narrative EXAMINATION: MRI BRAIN WO CONTRAST     CLINICAL HISTORY:  stroke     COMPARISONS: Brain MRI from August 20, 2015    TECHNIQUE:    Multiplanar multisequence images of the brain were obtained without contrast. Diffusion perfusion imaging was obtained. FINDINGS:      There are no extra-axial collections. There is no evidence of hemorrhage. There are no areas of perfusion diffusion signal abnormality to suggest ischemia. The susceptibility images do not demonstrate evidence of hemosiderin deposition within the brain   parenchyma or the leptomeninges. There is preservation of the gray-white matter differentiation. There are numerous foci of T2/FLAIR hyperintensities in the subcortical and periventricular white matter in a symmetric distribution throughout both hemispheres. There is prominence of the   sulci and ventricles consistent with moderate global cerebral atrophy and chronic involutional changes. There are dilated perivascular spaces versus lacunar infarcts in the bilateral basal ganglia. The midline structures are intact, the corpus callosum is within normal limits. The region of the pineal gland and the sella turcica are unremarkable. There are no space-occupying lesions in the posterior fossa. The basilar cisterns are patent. The craniocervical junction is unremarkable.      The visualized portions of the orbits are within normal limits, the globes are intact. The visualized portions of the paranasal sinuses are within normal limits. The calvarium and soft tissues are unremarkable. Impression    There are no acute intracranial changes, there is no evidence of hemorrhage or acute ischemia. MRA of the Head and Neck: No results found for this or any previous visit. No results found for this or any previous visit. No results found for this or any previous visit. CT of the Head:   Results for orders placed during the hospital encounter of 03/04/21   CT Head WO Contrast    Narrative CT Brain. Contrast medium:  without contrast.. History:  Confusion. Technical factors: CT imaging of the brain was obtained and formatted as 5 mm contiguous axial images. 2.5 mm contiguous axial images were obtained through the osseous structures. Sagittal and coronal reconstruction obtained during postprocessing. Comparison:  CT brain, September 16, 2020 MRI/MRA brain,  August 28, 2015. Findings:    Study limited secondary to motion artifact. Extra-axial spaces:  Normal.     Intracranial hemorrhage:  None. Ventricular system: Ventricles mildly enlarged. Sulci mildly prominent. Basal Cisterns:  Normal.    Cerebral Parenchyma: Bilateral symmetric periventricular areas decreased attenuation    Midline Shift:  None. Cerebellum:  Normal.     Paranasal sinuses and mastoid air cells:  Normal.    Visualized Orbits:  Normal.        Impression Impression:    Study limited secondary to motion artifact. No acute findings. Mild cerebral atrophy. Chronic ischemic white matter disease. All CT scans at this facility use dose modulation, iterative reconstruction, and/or weight based dosing when appropriate to reduce radiation dose to as low as reasonably achievable. No results found for this or any previous visit. No results found for this or any previous visit. Carotid duplex: No results found for this or any previous visit. No results found for this or any previous visit. No results found for this or any previous visit. Echo No results found for this or any previous visit. Assessment/Plan:    Dementia with worsening with the possibility of underlying possible strokes which can occur in atrial fibrillation patient is not on anticoagulation patient has been falling. We will try and obtain an MRI of the brain if he can as she appears to be somewhat agitated we will perform this under sedation. Patient has gait ataxia with frontal gait disorder with significant dementia. I am awaiting her daughter for more history. Otherwise patient has a nonfocal examination but there may be some right-sided findings. Dementia with behavioral disturbances  Remeron has been initiated to help with sleep and appetite  New onset atrial fibrillation, oral anticoagulation not recommended due to patient's falls risk  Recurrent falls with multiple rib fractures  Right pleural effusion, IR consulted  MRI of the brain negative for any acute intracranial findings  Check B12 and folate  Palliative care on consult  Patient is now DNR CCA  Will follow    Dysphagia, n.p.o. Hospice consult pending    I have personally performed a face to face diagnostic evaluation on this patient, reviewed all data and investigations, and am the sole provider of all clinical decisions on the neurological status of this patient. pt has dementia, and behaviour issues, mri normal, recommend supportive care      Alessandro Joseph MD, Zoe Avendano, American Board of Psychiatry & Neurology  Board Certified in Vascular Neurology  Board Certified in Neuromuscular Medicine  Certified in Neurorehabilitation     Collaborating physicians: Dr Sona Joseph    Electronically signed by COLT Cain CNP on 3/9/2021 at 3:46 PM

## 2021-03-09 NOTE — PROGRESS NOTES
Palliative Care Progress Note  Patient: Kedar Moreno  Gender: female  YOB: 1928  Unit/Bed: Z790/U629-28  Code Status: DNR-CCA  Inpatient Treatment Team: Treatment Team: Attending Provider: Sedrick Gray MD; Consulting Physician: Annelise Jiménez MD; Consulting Physician: Janae Chavarria MD; Utilization Reviewer: Alexys William, RN; Consulting Physician: Preet Trammell MD; Consulting Physician: Bobbi Miller MD; Utilization Reviewer: Tiffanie Brice, RIGO; : Asia Torrez, RIGO; Patient Care Tech: Rickie Tang; : Viveca Olszewski, LSW; Registered Nurse: Jacki Barrera RN  Admit Date:  3/4/2021    Chief Complaint: Goals of Care    History of Presenting Illness:      Kedar Moreno is a 80 y.o. female on hospital day 5 with a history of  CAD (coronary artery disease), Chronic anemia, Dementia ([address] [place] (hard of hearing), Hyperlipidemia, Hypertension, and Type II or unspecified type diabetes mellitus presented with new onset Atrial Fibrillation. Several falls at home, right rib fracture, possible hemothorax. noted. Anna Dues Possible placement in SNF     She is sedated as she had an MRI this am. Currently profoundly confused, has a lap belt due to impulsive behavior and multiple attempts to climb out of bed. HX of frequent falls and on anticoagulation. Family state rapid mental status change, no infectious cause found, head CT negative. She is unable to participate in PT due to her unfortunate cognitive state. She is refusing most of her food and medicine. No dysphagia. Albumin 2.9     Prior to this incident she was incontinent of stool and urine, needed assistance for all ADLS, transferred with assistance per her daughter. 3/9/21     Lethargic, wakes to voice, speaks tangentially, does not respond to requests. Not oriented to self or place. Awaiting a SLP consult, still declining most food and water. Unable to participate in PT due to cognitive status.  She is on room air and oxygen sat is 98%, thus no surgical intervention will be done on pleural effusion due to frail state. PAIN AD zero, appears calm and comfortable, has not had to use haldol today. If she continues to refuse food and water and SLP cannot find a reason she will not eat, I recommend hospice care as family declines feeding tube. Review of Systems:       Review of Systems   Unable to perform ROS: Dementia       Physical Examination:       BP (!) 164/43   Pulse 88   Temp 100.3 °F (37.9 °C) (Axillary)   Resp 17   Ht 5' (1.524 m)   Wt 101 lb 12.8 oz (46.2 kg)   LMP  (LMP Unknown)   SpO2 95%   BMI 19.88 kg/m²    Physical Exam  Constitutional:       General: She is not in acute distress. Appearance: She is cachectic. She is not diaphoretic. HENT:      Head: Normocephalic and atraumatic. Right Ear: External ear normal.      Left Ear: External ear normal.      Nose: Nose normal.      Mouth/Throat:      Pharynx: No oropharyngeal exudate. Eyes:      General: No scleral icterus. Right eye: No discharge. Left eye: No discharge. Conjunctiva/sclera: Conjunctivae normal.      Pupils: Pupils are equal, round, and reactive to light. Neck:      Musculoskeletal: Normal range of motion and neck supple. Thyroid: No thyromegaly. Vascular: No JVD. Trachea: No tracheal deviation. Cardiovascular:      Rate and Rhythm: Normal rate and regular rhythm. Heart sounds: Normal heart sounds. Pulmonary:      Effort: Pulmonary effort is normal. No respiratory distress. Breath sounds: No stridor. Decreased breath sounds present. No wheezing or rales. Chest:      Chest wall: No tenderness. Abdominal:      General: Bowel sounds are normal. There is no distension. Palpations: Abdomen is soft. There is no mass. Tenderness: There is no abdominal tenderness. There is no guarding or rebound. Musculoskeletal: Normal range of motion.          General: No tenderness or deformity. Lymphadenopathy:      Cervical: No cervical adenopathy. Skin:     General: Skin is warm and dry. Capillary Refill: Capillary refill takes less than 2 seconds. Coloration: Skin is pale. Findings: No erythema or rash. Neurological:      Mental Status: She is unresponsive. Psychiatric:         Attention and Perception: She is inattentive.          Speech: Speech is tangential.         Allergies:      No Known Allergies    Medications:      Current Facility-Administered Medications   Medication Dose Route Frequency Provider Last Rate Last Admin    metoprolol (LOPRESSOR) injection 2.5 mg  2.5 mg Intravenous Q6H PRN Celine Ferraro MD        metoprolol tartrate (LOPRESSOR) tablet 25 mg  25 mg Oral BID Osiris Blount MD   25 mg at 03/08/21 2231    mirtazapine (REMERON) tablet 7.5 mg  7.5 mg Oral Nightly Kristian Nelson Sedar, DO   7.5 mg at 03/08/21 2231    haloperidol lactate (HALDOL) injection 1 mg  1 mg Intramuscular Q6H PRN Kristian Nelson Sedar, DO        sodium chloride flush 0.9 % injection 10 mL  10 mL Intravenous 2 times per day Princess Dubs, APRN - CNP   10 mL at 03/09/21 0825    sodium chloride flush 0.9 % injection 10 mL  10 mL Intravenous PRN Princess Dubs, APRN - CNP        promethazine (PHENERGAN) tablet 12.5 mg  12.5 mg Oral Q6H PRN Princess Dubs, APRN - CNP        Or    ondansetron [facility]) injection 4 mg  4 mg Intravenous Q6H PRN Princess Dubs, APRN - CNP        polyethylene glycol (GLYCOLAX) packet 17 g  17 g Oral Daily PRN Princess Dubs, APRN - CNP        acetaminophen (TYLENOL) tablet 650 mg  650 mg Oral Q6H PRN Princess Dubs, APRN - CNP   650 mg at 03/07/21 2021    Or    acetaminophen (TYLENOL) suppository 650 mg  650 mg Rectal Q6H PRN Princess Dubs, APRN - CNP        glucose (GLUTOSE) 40 % oral gel 15 g  15 g Oral PRN Flor Mendoza, APRN - CNP        dextrose 50 % IV solution  12.5 g Intravenous PRN COLT Anderson CNP        glucagon (rDNA) injection 1 mg  1 mg Intramuscular PRN COLT Anderson CNP        dextrose 5 % solution  100 mL/hr Intravenous PRN COLT Anderson CNP        insulin lispro (HUMALOG) injection vial 0-12 Units  0-12 Units Subcutaneous TID WC COLT Anderson CNP        insulin lispro (HUMALOG) injection vial 0-6 Units  0-6 Units Subcutaneous Nightly COLT Anderson CNP   1 Units at 03/07/21 2030       History: PMHx:  Past Medical History:   Diagnosis Date    CAD (coronary artery disease)     Chronic anemia     Dementia ([facility] [address]     [place] (hard of hearing)     Hyperlipidemia     Hypertension     Type II or unspecified type diabetes mellitus without mention of complication, not stated as uncontrolled        PSHx:  Past Surgical History:   Procedure Laterality Date    BLADDER SUSPENSION      CARDIAC CATHETERIZATION      CATARACT REMOVAL      CHOLECYSTECTOMY  1990    COLONOSCOPY  1-07    CORONARY ANGIOPLASTY WITH STENT PLACEMENT      CORONARY ANGIOPLASTY WITH STENT PLACEMENT  5-08    FOOT SURGERY      L    HYSTERECTOMY  1964       Social Hx:  Social History     Socioeconomic History    Marital status:       Spouse name: None    Number of children: None    Years of education: None    Highest education level: None   Occupational History    None   Social Needs    Financial resource strain: None    Food insecurity     Worry: None     Inability: None    Transportation needs     Medical: None     Non-medical: None   Tobacco Use    Smoking status: Never Smoker    Smokeless tobacco: Never Used   Substance and Sexual Activity    Alcohol use: No    Drug use: No    Sexual activity: None   Lifestyle    Physical activity     Days per week: None     Minutes per session: None    Stress: None   Relationships    Social connections     Talks on phone: None     Gets together: 03/09/2021     03/09/2021    CO2 20 03/09/2021    BUN 21 03/09/2021    LABALBU 3.0 03/09/2021    LABALBU 4.2 11/07/2011    CREATININE 0.64 03/09/2021    CALCIUM 8.8 03/09/2021    GFRAA >60.0 03/09/2021    LABGLOM >60.0 03/09/2021    GLUCOSE 138 03/09/2021    GLUCOSE 121 11/07/2011     TSH:   Lab Results   Component Value Date    TSH 1.730 03/09/2019     Vitamin B12 and Folate: No components found for: FOLIC,  D88  Urinalysis:   Lab Results   Component Value Date    NITRU Negative 03/04/2021    WBCUA 0-2 03/04/2021    BACTERIA Negative 03/04/2021    RBCUA 0-2 03/04/2021    BLOODU Negative 03/04/2021    SPECGRAV 1.015 03/04/2021    GLUCOSEU Negative 03/04/2021           FUNCTIONAL ADL´S:     Independent: [  ] Eating, [   ] Dressing, [   ] Transferring, [   ] Jackolyn Brandon, [   ] Leslie Edvin, [   ] Continence  Dependent   : x  ] Eating, [   x] Dressing, [   ] Transferring, [ x  ] Toileting, [x ] Bathing, [  x ]x Continence  W. assistant : [  ] Eating, [   ] Dressing, [   ] Transferring, [   ] Jackolyn Brandon, [   ] Bathing, [   ] Continence    Radiology: Reviewed      Xr Chest Decubitus Right    Result Date: 3/6/2021  EXAMINATION:  XR CHEST DECUBITUS LEFT, XR CHEST DECUBITUS RIGHT CLINICAL HISTORY:  pleural effusion . COMPARISONS:  NONE AVAILABLE TECHNIQUE:  Bilateral decubitus films of the chest. 2 views are submitted. FINDINGS:  There is a right pleural effusion probably loculated as it does not appear to be free flowing. No left-sided pleural effusion. The field-of-view there is fractures of the lateral aspect of the right seventh eighth ninth and 10th ribs. RIGHT-SIDED PLEURAL EFFUSION WHICH IS PROBABLY LOCULATED AS IT IS NOT FREELY FLOWING. Xr Chest Decubitus Left    Result Date: 3/6/2021  EXAMINATION:  XR CHEST DECUBITUS LEFT, XR CHEST DECUBITUS RIGHT CLINICAL HISTORY:  pleural effusion . COMPARISONS:  NONE AVAILABLE TECHNIQUE:  Bilateral decubitus films of the chest. 2 views are submitted.  FINDINGS:  There is a ischemia. Assessment and plan:      -Advance Care Planning  Discussed goals of care with her daughter Chau Crowder. Explained in extensive detail nuances between full code, DNR CCA and DNR CC. Chau Crowder has made the decision to be:    DNR CCA  No feeding tube      Palliative Performance Scale 10  Estimated Fast 7d    Due to advanced dementia, Albumin 2.9, unable to maintain hydration and nutrition,inability to participate in rehab, and pneumothorax She would qualify for hospice care at this point      -Goals of Care Discussion:  Disease process and typical progression and goals of treatment were discussed in basic terms. Nitza's  Goal for her mother is to optimize available comfort care measures to decrease hospitalizations and maximize function. We discussed the palliative care philosophy in light of those goals. We discussed all care options contingent on treatment response and QOL. Much active listening, presence, and emotional support were given. Anorexia  Continue Mirtazapine 7.5 mg po at [facility]  Consider nutritional supplements  Snacks at bedside      Jamaal Lujan is being treated by other specialties while hospitalized for the following:  Multiple closed fractures of ribs of right side [S22.41XA]       New onset a-fib ([facility] [address] [I48.91] 03/04/2021    Nausea & vomiting [R11.2] 03/04/2021    Falls [W19. XXXA] 03/04/2021    General weakness [R53.1] 03/04/2021    Hyponatremia [E87.1] 03/04/2021    Pleural effusion, right [J90] 03/04/2021    Dementia ([facility] [address] [F03.90]      Type 2 diabetes mellitus with complication ([address] [L36.1] 12/16/2015    CAD (coronary artery disease) [I25.10]            If family does not elect hospice or Jamaal Lujan improves she would benefit form outpatient palliative care treatment. Thank you for allowing me to participate in [address] [place] [address].         Electronically signed by COLT Raygoza CNP on 3/9/2021 at 12:00 PM

## 2021-03-09 NOTE — PROGRESS NOTES
[facility] is schedule to meet with patients daughter on 3/10/2021 between [id number] at [facility].

## 2021-03-09 NOTE — PROGRESS NOTES
[facility]   [facility]  Speech Language Pathology  Clinical Bedside Swallow Evaluation    NAME:Kaye Fernandez  : 2/10/1928 (83 y.o.)   MRN: [id number]  ROOM: [place] [id number]  ADMISSION DATE: 3/4/2021  PATIENT DIAGNOSIS(ES): New onset a-fib ([address] [I48.91]  New onset a-fib ([address] [I48.91]  Chief Complaint   Patient presents with    Emesis     nauseated was given 4 mg of zofran and 500 ml of NS      Patient Active Problem List    Diagnosis Date Noted    Multiple closed fractures of ribs of right side     New onset a-fib ([address] 2021    Nausea & vomiting 2021    Falls 2021    Weakness 2021    Hyponatremia 2021    Pleural effusion, right 2021    Dementia ([address]     Type 2 diabetes mellitus with complication ([address]     Impaired mobility and activities of daily living 2015    Neck pain 2015    Osteoarthritis 2014    Dizziness, nonspecific 2013    Vitamin B12 deficiency 10/24/2012    Hyperlipidemia     Chronic anemia     CAD (coronary artery disease)     [place] (hard of hearing)      Past Medical History:   Diagnosis Date    CAD (coronary artery disease)     Chronic anemia     Dementia ([address]     [place]hard of hearing[place]     Hyperlipidemia     Hypertension     Type II or unspecified type diabetes mellitus without mention of complication, not stated as uncontrolled      Past Surgical History:   Procedure Laterality Date    BLADDER SUSPENSION      CARDIAC CATHETERIZATION      CATARACT REMOVAL      CHOLECYSTECTOMY      COLONOSCOPY      CORONARY ANGIOPLASTY WITH STENT PLACEMENT      CORONARY ANGIOPLASTY WITH STENT PLACEMENT      FOOT SURGERY      L    HYSTERECTOMY  1964     No Known Allergies    DATE ONSET: 3/4/2021    Date of Evaluation: 3/9/2021   Evaluating Therapist: CHARMAINE Sen    Recommended Diet and Intervention  Diet Solids Recommendation: NPO  Liquid Consistency Recommendation: NPO  Recommended Form of Meds: Via alternative means of nutrition  Recommendations: NPO;Dysphagia treatment; Therapeutic feeds with SLP only  Therapeutic Interventions: Therapeutic PO trials with SLP, Patient/Family education       Reason for Referral  Isaías Bella was referred for a bedside swallow evaluation to assess the efficiency of her swallow function, identify signs and symptoms of aspiration and make recommendations regarding safe dietary consistencies, effective compensatory strategies, and safe eating environment. General  Chart Reviewed: Yes  Subjective  Subjective: Pt has had poor PO intake per chart review, pallative care consult completed, hospice consult ordered  Behavior/Cognition: Alert; Cooperative;Pleasant mood;Confused  Respiratory Status: Room air  O2 Device: None (Room air)  Communication Observation: Dysarthria  Follows Directions: None  Dentition: Poor dental/oral hygeine; Some missing teeth  Patient Positioning: Upright in bed  Baseline Vocal Quality: Weak  Volitional Cough: (pt unable)  Prior Dysphagia History: None  Consistencies Administered: Dysphagia Pureed (Dysphagia I); Thin - cup; Thin - teaspoon; Thin - straw    Current Diet level:  Current Diet : Regular  Current Liquid Diet : Thin    Oral Motor Deficits  Oral/Motor  Oral Motor: Exceptions to WFL(unable to follow directions to complete)  Labial ROM: Reduced left; Reduced right  Labial Strength: Reduced  Labial Coordination: Reduced  Lingual ROM: Reduced left; Reduced right  Lingual Strength: Reduced  Lingual Coordination: Reduced    Oral Phase Dysfunction  Oral Phase  Oral Phase: Exceptions  Oral Phase Dysfunction  Spillage Left: Puree; Thin - teaspoon; Thin - cup; Thin - straw  Decreased Anterior to Posterior Transit: All  Suspected Premature Bolus Loss: All  Lingual/Palatal Residue: Puree  Oral Phase  Oral Phase - Comment: Severe oral dysphagia characterized by generalized oral weakness with decreased lingual/labial strength and ROM resulting in weak and incomplete labial seal around cup, spoon, and straw with anterior bolus loss per all consistencies tested to the left, which the pt did not sense. Absent A-P transit noted per thin liquid trials from all methods of intake. Premature bolus loss to the pharynx observed due to absent lingual control/coordination as the pt maintained an open mouth posture with no visible attempt to propel bolus posteriorly, however bolus was no longer in the oral cavity. Per the initial trial of puree, pt again demonstrated oral holding no attempt to propel bolus posteriorly. After approximately 30 seconds and max prompting and tactile cues, pt began to initiate A-P transit of 1/4 tsp puree bolus, however max residuals remained throughout the oral cavity post swallow that pt made no attempts to clear. SLP cleared residuals with toothette. Indicators of Pharyngeal Phase Dysfunction   Pharyngeal Phase  Pharyngeal Phase: Exceptions  Indicators of Pharyngeal Phase Dysfunction  Wet Vocal Quality: Thin - teaspoon  Cough - Immediate: Thin - teaspoon  Pharyngeal Phase   Pharyngeal: Suspected pharyngeal dysphagia characterized by absent hyolaryngeal movement and pharyngeal swallow following trial of thin water via teaspoon following by an immediate, weak cough. Delayed initiation of the pharyngeal swallow per puree is suspected, however no overt s/s of aspiration observed following 1 trial. Additional trials were not deemed safe by SLP at this time due to fluctuating alertness and inconsistent ability to produce a pharyngeal swallow. Impression  Dysphagia Diagnosis: Severe oral stage dysphagia; Severe pharyngeal stage dysphagia  Dysphagia Impression : Clinical signs of oral and pharyngeal dysphagia are present, likely acute-on-chronic related to dementia and generalized weakness . Swallow prognosis is guarded.  Instrumental swallow study is not indicated at this time due to pt's ability to consistently produce a pharyngeal swallow. Given severity of dysphagia and high risk for aspiration pneumonia, pt is not safe for oral diet at this time. Dysphagia Outcome Severity Scale: Level 1: Severe dysphagia- NPO. Unable to tolerate any PO safely     Treatment Plan  Requires SLP Intervention: Yes  Duration/Frequency of Treatment: 2-4x/week for LOS or until goals are met  D/C Recommendations: To be determined       Treatment/Goals  Short-term Goals  Timeframe for Short-term Goals: 1-2 weeks  Goal 1: Pt will tolerate therapeutic PO food/liquid trials with consistencies to be determined by treating SLP without overt s/s of aspiration in all opportunities. Goal 2: POC to be updated as appropriate. Long-term Goals  Timeframe for Long-term Goals: 1-2 weeks  Goal 1: Pt will tolerate the least restrictive diet without advserse outcomes. Dysphagia Goals: The patient will tolerate puree foods 10/10. Prognosis  Prognosis  Prognosis for safe diet advancement: guarded  Barriers to reach goals: cognitive deficits;severity of dysphagia;inconsistent alertness  Individuals consulted  Consulted and agree with results and recommendations: Patient;RN(RIGO Nieto)    Education  Patient Education: Results of BSE  Patient Education Response: No evidence of learning  Safety Devices in place: Yes  Type of devices: Bed alarm in place;Call light within reach; Other (comment)(avasys in room)    Pain Assessment:  Initial Assessment:  Patient c/o: \"it hurts all over, baby doll\"  RIGO Nieto notified. Re-assessment:  Pt unable to state    [x]  Evaluation routed to ordering physician inbox. RIGO Nieto notified physician via perfect serve.           Therapy Time  SLP Individual Minutes  Time In: 2978  Time Out: 4689  Minutes: 18          Signature: Electronically signed by CHARMAINE Arshad on 3/9/2021 at 1:54 PM

## 2021-03-09 NOTE — PROGRESS NOTES
Physical Therapy Missed Treatment   Facility/Department: [facility] MED SURG A981/C733-98    NAME: Marian Silva    : 2/10/1928 (80 y.o.)  MRN: [id number]    Account: [de-identified]  Gender: female      Referral received. Chart reviewed. Pt has hospice consult. Pts nurse indicates appropriate to delay eval at this time. Will follow and attempt PT evaluation again at earliest availability.        Gerri Canseco, PT, 21 at 2:04 PM

## 2021-03-09 NOTE — CARE COORDINATION
The LSW left a message for the patient's daughter, Moy Rosenthal, regarding the hospice consult. Electronically signed by Damaris Mott on 3/9/21 at 12:44 PM EST    The patient's daughter, Moy Rosenthal, called the LSW back. [place] of choice was discussed regarding the hospice consult. The patient's daughter would like [place] [id number]. The LSW called and discussed the referral with Pamela Mcneil at [place] [address]. Electronically signed by IRWIN Felton on 3/9/21 at 2:04 PM EST    Per Pamela Mcneil at [place] [address], hospice will meet with the patient's daughter 3/10/2021 at 10:15-10:30 am.  The LSW updated the patient's RN.  Electronically signed by IRWIN Felton on 3/9/21 at 4:10 PM EST

## 2021-03-10 LAB
ALBUMIN SERPL-MCNC: 2.8 G/DL (ref 3.5–4.6)
ALP BLD-CCNC: 141 U/L (ref 40–130)
ALT SERPL-CCNC: 26 U/L (ref 0–33)
ANION GAP SERPL CALCULATED.3IONS-SCNC: 18 MEQ/L (ref 9–15)
AST SERPL-CCNC: 61 U/L (ref 0–35)
BASOPHILS ABSOLUTE: 0 K/UL (ref 0–0.2)
BASOPHILS RELATIVE PERCENT: 0.3 %
BILIRUB SERPL-MCNC: 0.9 MG/DL (ref 0.2–0.7)
BUN BLDV-MCNC: 25 MG/DL (ref 8–23)
CALCIUM SERPL-MCNC: 9.3 MG/DL (ref 8.5–9.9)
CHLORIDE BLD-SCNC: 103 MEQ/L (ref 95–107)
CO2: 20 MEQ/L (ref 20–31)
CREAT SERPL-MCNC: 0.64 MG/DL (ref 0.5–0.9)
EOSINOPHILS ABSOLUTE: 0.1 K/UL (ref 0–0.7)
EOSINOPHILS RELATIVE PERCENT: 1.3 %
GFR AFRICAN AMERICAN: >60
GFR NON-AFRICAN AMERICAN: >60
GLOBULIN: 4.5 G/DL (ref 2.3–3.5)
GLUCOSE BLD-MCNC: 132 MG/DL (ref 70–99)
GLUCOSE BLD-MCNC: 137 MG/DL (ref 60–115)
GLUCOSE BLD-MCNC: 149 MG/DL (ref 60–115)
GLUCOSE BLD-MCNC: 162 MG/DL (ref 60–115)
GLUCOSE BLD-MCNC: 227 MG/DL (ref 60–115)
GLUCOSE BLD-MCNC: 247 MG/DL (ref 60–115)
HCT VFR BLD CALC: 31.8 % (ref 37–47)
HEMOGLOBIN: 10.2 G/DL (ref 12–16)
LYMPHOCYTES ABSOLUTE: 0.8 K/UL (ref 1–4.8)
LYMPHOCYTES RELATIVE PERCENT: 8.5 %
MCH RBC QN AUTO: 30.8 PG (ref 27–31.3)
MCHC RBC AUTO-ENTMCNC: 32 % (ref 33–37)
MCV RBC AUTO: 96.4 FL (ref 82–100)
MONOCYTES ABSOLUTE: 0.8 K/UL (ref 0.2–0.8)
MONOCYTES RELATIVE PERCENT: 8.4 %
NEUTROPHILS ABSOLUTE: 8 K/UL (ref 1.4–6.5)
NEUTROPHILS RELATIVE PERCENT: 81.5 %
PDW BLD-RTO: 15.7 % (ref 11.5–14.5)
PERFORMED ON: ABNORMAL
PLATELET # BLD: 366 K/UL (ref 130–400)
POTASSIUM REFLEX MAGNESIUM: 4 MEQ/L (ref 3.4–4.9)
RBC # BLD: 3.3 M/UL (ref 4.2–5.4)
SODIUM BLD-SCNC: 141 MEQ/L (ref 135–144)
TOTAL PROTEIN: 7.3 G/DL (ref 6.3–8)
WBC # BLD: 9.8 K/UL (ref 4.8–10.8)

## 2021-03-10 PROCEDURE — 99231 SBSQ HOSP IP/OBS SF/LOW 25: CPT | Performed by: NURSE PRACTITIONER

## 2021-03-10 PROCEDURE — 99232 SBSQ HOSP IP/OBS MODERATE 35: CPT | Performed by: INTERNAL MEDICINE

## 2021-03-10 PROCEDURE — APPSS15 APP SPLIT SHARED TIME 0-15 MINUTES: Performed by: NURSE PRACTITIONER

## 2021-03-10 PROCEDURE — 92507 TX SP LANG VOICE COMM INDIV: CPT

## 2021-03-10 PROCEDURE — 2500000003 HC RX 250 WO HCPCS: Performed by: INTERNAL MEDICINE

## 2021-03-10 PROCEDURE — 6370000000 HC RX 637 (ALT 250 FOR IP): Performed by: INTERNAL MEDICINE

## 2021-03-10 PROCEDURE — 6370000000 HC RX 637 (ALT 250 FOR IP): Performed by: NURSE PRACTITIONER

## 2021-03-10 PROCEDURE — 36415 COLL VENOUS BLD VENIPUNCTURE: CPT

## 2021-03-10 PROCEDURE — 80053 COMPREHEN METABOLIC PANEL: CPT

## 2021-03-10 PROCEDURE — 92526 ORAL FUNCTION THERAPY: CPT

## 2021-03-10 PROCEDURE — 2580000003 HC RX 258: Performed by: NURSE PRACTITIONER

## 2021-03-10 PROCEDURE — 2060000000 HC ICU INTERMEDIATE R&B

## 2021-03-10 PROCEDURE — 85025 COMPLETE CBC W/AUTO DIFF WBC: CPT

## 2021-03-10 RX ORDER — CLONIDINE 0.2 MG/24H
1 PATCH, EXTENDED RELEASE TRANSDERMAL WEEKLY
Status: DISCONTINUED | OUTPATIENT
Start: 2021-03-10 | End: 2021-03-13 | Stop reason: HOSPADM

## 2021-03-10 RX ADMIN — INSULIN LISPRO 4 UNITS: 100 INJECTION, SOLUTION INTRAVENOUS; SUBCUTANEOUS at 17:18

## 2021-03-10 RX ADMIN — Medication 10 ML: at 08:16

## 2021-03-10 RX ADMIN — Medication 10 ML: at 20:09

## 2021-03-10 RX ADMIN — METOPROLOL TARTRATE 2.5 MG: 5 INJECTION INTRAVENOUS at 23:09

## 2021-03-10 RX ADMIN — MIRTAZAPINE 7.5 MG: 15 TABLET, FILM COATED ORAL at 20:10

## 2021-03-10 ASSESSMENT — ENCOUNTER SYMPTOMS
COUGH: 0
WHEEZING: 0
COLOR CHANGE: 0
TROUBLE SWALLOWING: 0

## 2021-03-10 ASSESSMENT — PAIN SCALES - GENERAL: PAINLEVEL_OUTOF10: 0

## 2021-03-10 NOTE — PROGRESS NOTES
Progress Note  Patient: Ben Bran  Unit/Bed: C919/I252-50  YOB: 1928  MRN: [id number]  Acct: [de-identified]   Admitting Diagnosis: New onset a-fib [facility]) [I48.91]  New onset a-fib [facility]) [I48.91]  Admit Date:  3/4/2021  Hospital Day: 6    Chief Complaint:AF MS changes Pleural effusion    Histories:  Past Medical History:   Diagnosis Date    CAD (coronary artery disease)     Chronic anemia     Dementia ([address]     [place] (hard of hearing)     Hyperlipidemia     Hypertension     Type II or unspecified type diabetes mellitus without mention of complication, not stated as uncontrolled      Past Surgical History:   Procedure Laterality Date    BLADDER SUSPENSION      CARDIAC CATHETERIZATION      CATARACT REMOVAL      CHOLECYSTECTOMY  1990    COLONOSCOPY  1-07    CORONARY ANGIOPLASTY WITH STENT PLACEMENT      CORONARY ANGIOPLASTY WITH STENT PLACEMENT  5-08    FOOT SURGERY      L    HYSTERECTOMY  1964     Family History   Problem Relation Age of Onset    Heart Disease Mother     Diabetes Mother     Cancer Father         LUNG     Social History     Socioeconomic History    Marital status:       Spouse name: None    Number of children: None    Years of education: None    Highest education level: None   Occupational History    None   Social Needs    Financial resource strain: None    Food insecurity     Worry: None     Inability: None    Transportation needs     Medical: None     Non-medical: None   Tobacco Use    Smoking status: Never Smoker    Smokeless tobacco: Never Used   Substance and Sexual Activity    Alcohol use: No    Drug use: No    Sexual activity: None   Lifestyle    Physical activity     Days per week: None     Minutes per session: None    Stress: None   Relationships    Social connections     Talks on phone: None     Gets together: None     Attends Judaism service: None     Active member of club or organization: None     Attends meetings of clubs or organizations: None     Relationship status: None    Intimate partner violence     Fear of current or ex partner: None     Emotionally abused: None     Physically abused: None     Forced sexual activity: None   Other Topics Concern    None   Social History Narrative    None       Subjective/HPI  remains confused. Appears comfortable. Failed swallow study. EKG:SR 80        Review of Systems:   Review of Systems  Not reliable    Physical Examination:    BP (!) 170/63   Pulse 87   Temp 97.9 °F (36.6 °C) (Axillary)   Resp 18   Ht 5' (1.524 m)   Wt 96 lb 4.8 oz (43.7 kg)   LMP  (LMP Unknown)   SpO2 98%   BMI 18.81 kg/m²    Physical Exam   Constitutional: She appears healthy. No distress. HENT:   Normal cephalic and Atraumatic   Eyes: Pupils are equal, round, and reactive to light. Neck: Normal range of motion and thyroid normal. Neck supple. No JVD present. No neck adenopathy. No thyromegaly present. Cardiovascular: Normal rate, regular rhythm, intact distal pulses and normal pulses. Murmur heard. Pulmonary/Chest: Effort normal and breath sounds normal. She has no wheezes. She has no rales. She exhibits no tenderness. Abdominal: Soft. Bowel sounds are normal. There is no abdominal tenderness. Musculoskeletal: Normal range of motion. General: No tenderness or edema. Neurological: She exhibits altered mental status. Skin: Skin is warm. No cyanosis. Nails show no clubbing.        LABS:  CBC:   Lab Results   Component Value Date    WBC 9.8 03/10/2021    RBC 3.30 03/10/2021    RBC 4.05 11/07/2011    HGB 10.2 03/10/2021    HCT 31.8 03/10/2021    MCV 96.4 03/10/2021    MCH 30.8 03/10/2021    MCHC 32.0 03/10/2021    RDW 15.7 03/10/2021     03/10/2021    MPV 8.4 09/01/2015     CBC with Differential:    Lab Results   Component Value Date    WBC 9.8 03/10/2021    RBC 3.30 03/10/2021    RBC 4.05 11/07/2011    HGB 10.2 03/10/2021    HCT 31.8 03/10/2021     03/10/2021    MCV 96.4 03/10/2021    MCH 30.8 03/10/2021    MCHC 32.0 03/10/2021    RDW 15.7 03/10/2021    BANDSPCT 5 03/10/2015    LYMPHOPCT 8.5 03/10/2021    MONOPCT 8.4 03/10/2021    EOSPCT 20.5 11/07/2011    BASOPCT 0.3 03/10/2021    MONOSABS 0.8 03/10/2021    LYMPHSABS 0.8 03/10/2021    EOSABS 0.1 03/10/2021    BASOSABS 0.0 03/10/2021     CMP:    Lab Results   Component Value Date     03/09/2021    K 3.5 03/09/2021     03/09/2021    CO2 20 03/09/2021    BUN 21 03/09/2021    CREATININE 0.64 03/09/2021    GFRAA >60.0 03/09/2021    LABGLOM >60.0 03/09/2021    GLUCOSE 138 03/09/2021    GLUCOSE 121 11/07/2011    PROT 6.9 03/09/2021    LABALBU 3.0 03/09/2021    LABALBU 4.2 11/07/2011    CALCIUM 8.8 03/09/2021    BILITOT 0.8 03/09/2021    ALKPHOS 122 03/09/2021    AST 27 03/09/2021    ALT 13 03/09/2021     BMP:    Lab Results   Component Value Date     03/09/2021    K 3.5 03/09/2021     03/09/2021    CO2 20 03/09/2021    BUN 21 03/09/2021    LABALBU 3.0 03/09/2021    LABALBU 4.2 11/07/2011    CREATININE 0.64 03/09/2021    CALCIUM 8.8 03/09/2021    GFRAA >60.0 03/09/2021    LABGLOM >60.0 03/09/2021    GLUCOSE 138 03/09/2021    GLUCOSE 121 11/07/2011     Magnesium:    Lab Results   Component Value Date    MG 2.0 03/09/2021     Troponin:    Lab Results   Component Value Date    TROPONINI <0.010 03/04/2021        Active Hospital Problems    Diagnosis Date Noted    Multiple closed fractures of ribs of right side [S22.41XA]      Priority: Low    [facility] [I48.91] 03/04/2021     Priority: Low    Nausea & vomiting [R11.2] 03/04/2021     Priority: Low    Falls [W19. XXXA] 03/04/2021     Priority: Low    General weakness [R53.1] 03/04/2021     Priority: Low    Hyponatremia [E87.1] 03/04/2021     Priority: Low    Pleural effusion, right [J90] 03/04/2021     Priority: Low    Dementia ([facility] [F03.90]      Priority: Low    Type 2 diabetes mellitus with complication ([facility] [K34.2] 12/16/2015     Priority: Low    CAD (coronary artery disease) [I25.10]      Priority: Low        Assessment/Plan:  1. AF- converted to SR.   2. HTN - uncontrolled. Failed swallow and is NPO. Dc PO BB. Add Catapres patch. 3. Advanced Dementia. 4. Echo LVEF normal   5. Hospice evaluation in progress.        Electronically signed by Bhanu Singer MD on 3/10/2021 at 8:31 AM

## 2021-03-10 NOTE — PROGRESS NOTES
Hospitalist Daily Progress Note  Name: Carlton Turner  Age: 80 y.o. Gender: female  CodeStatus: DNR-CCA  Allergies: No Known Allergies    Chief Complaint:Emesis (nauseated was given 4 mg of zofran and 500 ml of NS )    Primary Care Provider: Melba Ace MD  InpatientTreatment Team: Treatment Team: Attending Provider: Homero Dale MD; Consulting Physician: Autumn Flaherty MD; Consulting Physician: Loren Hager MD; Utilization Reviewer: Melba Wiley, RN; Consulting Physician: Ameena Wing MD; Consulting Physician: Merlin Park MD; Utilization Reviewer: Johnson Tello, RN; : Antonio Valencia, RIGO; Patient Care Tech: Ramirezjen Del Toro; Registered Nurse: Eryn Smith RN  Admission Date: 3/4/2021      Subjective: Patient states pain is moderate, not comfortable. No cp, sob. Physical Exam  Constitutional:       Comments: Thin and cachectic. Agitated, confused   HENT:      Head: Normocephalic and atraumatic. Mouth/Throat:      Mouth: Mucous membranes are moist.      Pharynx: Oropharynx is clear. Eyes:      Conjunctiva/sclera: Conjunctivae normal.      Pupils: Pupils are equal, round, and reactive to light. Cardiovascular:      Rate and Rhythm: Tachycardia present. Rhythm irregular. Heart sounds: Murmur present. No friction rub. No gallop. Comments: BEST 2 out of 6  Pulmonary:      Breath sounds: Rales present. No wheezing or rhonchi. Abdominal:      General: There is no distension. Tenderness: There is no abdominal tenderness. There is no guarding. Musculoskeletal: Normal range of motion. Right lower leg: No edema. Left lower leg: No edema. Neurological:      Mental Status: She is alert. Comments: Oriented only to self. Psychiatric:      Comments: Odd mood and affect, very hard of hearing, but poor insight. Review of Systems  Reliable ROS not able to be obtained due to confusion.    Medications:  Reviewed    Infusion Medications:    dextrose       Scheduled Medications:    metoprolol tartrate  25 mg Oral BID    mirtazapine  7.5 mg Oral Nightly    sodium chloride flush  10 mL Intravenous 2 times per day    insulin lispro  0-12 Units Subcutaneous TID WC    insulin lispro  0-6 Units Subcutaneous Nightly     PRN Meds: metoprolol, haloperidol lactate, sodium chloride flush, promethazine **OR** ondansetron, polyethylene glycol, acetaminophen **OR** acetaminophen, glucose, dextrose, glucagon (rDNA), dextrose    Labs:   Recent Labs     03/07/21  0551 03/08/21  0551 03/09/21  0549   WBC 9.8 9.3 9.4   HGB 11.5* 11.2* 10.9*   HCT 34.7* 34.1* 34.1*   * 379 397     Recent Labs     03/07/21  0551 03/08/21  0551 03/09/21  0549    137 140   K 3.7 3.6 3.5    101 102   CO2 22 22 20   BUN 14 19 21   CREATININE 0.65 0.60 0.64   CALCIUM 9.1 8.6 8.8     Recent Labs     03/07/21  0551 03/08/21  0551 03/09/21  0549   AST 12 16 27   ALT 9 8 13   BILITOT 0.6 0.7 0.8*   ALKPHOS 112 108 122     No results for input(s): INR in the last 72 hours. No results for input(s): Towana Isiah in the last 72 hours. Urinalysis:   Lab Results   Component Value Date    NITRU Negative 03/04/2021    WBCUA 0-2 03/04/2021    BACTERIA Negative 03/04/2021    RBCUA 0-2 03/04/2021    BLOODU Negative 03/04/2021    SPECGRAV 1.015 03/04/2021    GLUCOSEU Negative 03/04/2021       Radiology:   Most recent    Chest CT      WITH CONTRAST:No results found for this or any previous visit. WITHOUT CONTRAST: No results found for this or any previous visit. CXR      2-view:   Results for orders placed during the hospital encounter of 09/29/18   XR CHEST STANDARD (2 VW)    Narrative EXAMINATION: XR CHEST (2 VW)    CLINICAL HISTORY: SYNCOPE    COMPARISONS: AUGUST 20 17,015    FINDINGS: Osteopenia. Remote fracture healed right humeral neck. Patient leaning to right. Cardiopericardial silhouette normal. Aorta tortuous and calcified. Lungs clear.       Impression NO ACUTE CARDIOPULMONARY DISEASE        Portable:   Results for orders placed during the hospital encounter of 09/16/20   XR CHEST PORTABLE    Narrative EXAMINATION: CHEST PORTABLE VIEW     CLINICAL HISTORY: Midsternal chest pain    COMPARISONS: May 3, 2019     FINDINGS:    Single  views of the chest is submitted. The cardiac silhouette is enlarged. Pulmonary vascular attenuated  Right sided trachea. No focal infiltrates. No Pneumothoraces. Probable old healed fracture right humeral head with associated severe degenerative joint disease                                                                                    Impression NO ACUTE ACTIVE CARDIOPULMONARY PROCESS       Echo No results found for this or any previous visit. Assessment/Plan:    Active Hospital Problems    Diagnosis Date Noted    Multiple closed fractures of ribs of right side [S22.41XA]     New onset a-fib ([facility] [I48.91] 03/04/2021    Nausea & vomiting [R11.2] 03/04/2021    Falls [W19. XXXA] 03/04/2021    General weakness [R53.1] 03/04/2021    Hyponatremia [E87.1] 03/04/2021    Pleural effusion, right [J90] 03/04/2021    Dementia ([facility] [F03.90]     Type 2 diabetes mellitus with complication ([facility] [U61.1] 12/16/2015    CAD (coronary artery disease) [I25.10]      Atrial Fibrillation with RVR: hold anticoagulation for possible procedure inpt. Poor candidate for chronic anticoagulation given frequent falls. Continue beta blocker. Cardiology following. Right pleural effusion: Consult to IR for thoracentesis. Consult pulmonary for recommendations regarding attempting any procedures given her advanced age and poor functional status. . Lateral cxr imaging shows loculated effusion    Dementia with confusion: daughter notes dramatic worsening compared to baseline. Ct head negative. No infectious cause.  Consult to neuro per family request.  Given the patient's relatively advanced dementia with poor functional status the patient does have a very poor prognosis. This is left with family to discuss further plans of care   3/8 - d/w daughter by phone. Arranging hospice meeting today/tomorrow when able. 3/9 - hospice scheduled family meeting morning 3/10    Falls with multiple rib fracture: PT/OT evaluation. se Remeron before bed, haldol 1mg IM PRN for agitation. DVT PPX held for possible thoracentesis. Additional work up or/and treatment plan may be added today or then after based on clinical progression. I am managing a portion of pt care. Some medical issues are handled byother specialists. Additional work up and treatment should be done in out pt setting by pt PCP and other out pt providers. In addition to examining and evaluating pt, I spent additional time explaining care, normaland abnormal findings, and treatment plan. All of pt questions were answered. Counseling, diet and education were provided. Case will be discussed with nursing staff when appropriate. Family will be updated if and whenappropriate.       Electronically signed by Derek Lechuga MD on 3/10/2021 at 12:49 AM

## 2021-03-10 NOTE — PROGRESS NOTES
[facility] Neurology Daily Progress Note  Name: Joan Alexander  Age: 80 y.o. Gender: female  CodeStatus: DNR-CCA  Allergies: No Known Allergies    Chief Complaint:Emesis (nauseated was given 4 mg of zofran and 500 ml of NS )    Primary Care Provider: Leila Sands MD  InpatientTreatment Team: Treatment Team: Attending Provider: Diana Fitzpatrick MD; Consulting Physician: Sixto Armijo MD; Consulting Physician: Jake Kerr MD; Utilization Reviewer: Johnna Mancilla RN; Consulting Physician: Page Canchola MD; Consulting Physician: Belia Gastelum MD; Utilization Reviewer: Jerel Morgan RN; : Mohan Zarate RN; Registered Nurse: Mevelyn Heimlich, RN; : IRWIN Felton; Registered Nurse: Quita Lin RN  Admission Date: 3/4/2021         Pt seen and examined for neuro follow up for dementia with behavioral disturbances in the setting of new onset atrial fibrillation and recurrent falls with multiple rib fractures. Alert. Profoundly confused. Agitated at times. Does not follow commands. No obvious focal neuro deficits. No seizure activity reported. Appetite poor. Patient with dysphagia. NPO. Family met with hospice today and is deciding on hospice services    Pt denies symptoms    Vitals:    03/10/21 0805   BP: (!) 170/63   Pulse: 87   Resp: 18   Temp:    SpO2:       Review of Systems   Unable to perform ROS: Dementia   Constitutional: Positive for appetite change. Negative for fever. HENT: Positive for hearing loss. Negative for trouble swallowing. Respiratory: Negative for cough and wheezing. Cardiovascular: Negative for leg swelling. Skin: Negative for color change and rash. Neurological: Positive for weakness (  Generalized). Negative for tremors, seizures, syncope, facial asymmetry and speech difficulty. Psychiatric/Behavioral: Positive for agitation, behavioral problems, confusion and sleep disturbance. The patient is not nervous/anxious.       Physical Exam  Vitals signs and nursing note reviewed. Constitutional:       General: She is sleeping. She is not in acute distress. Appearance: She is not diaphoretic. Interventions: She is sedated. HENT:      Head: Normocephalic and atraumatic. Eyes:      Pupils: Pupils are equal, round, and reactive to light. Cardiovascular:      Rate and Rhythm: Normal rate. Rhythm irregular. Pulmonary:      Effort: Pulmonary effort is normal. No respiratory distress. Breath sounds: Normal breath sounds. Abdominal:      General: Bowel sounds are normal.      Palpations: Abdomen is soft. Skin:     General: Skin is warm and dry. Neurological:      General: No focal deficit present. Mental Status: She is disoriented. Motor: No tremor or seizure activity.       Comments: Patient unable to cooperate in neuro exam therefore it is limited           Medications:  Reviewed    Infusion Medications:    dextrose       Scheduled Medications:    cloNIDine  1 patch Transdermal Weekly    mirtazapine  7.5 mg Oral Nightly    sodium chloride flush  10 mL Intravenous 2 times per day    insulin lispro  0-12 Units Subcutaneous TID WC    insulin lispro  0-6 Units Subcutaneous Nightly     PRN Meds: metoprolol, haloperidol lactate, sodium chloride flush, promethazine **OR** ondansetron, polyethylene glycol, acetaminophen **OR** acetaminophen, glucose, dextrose, glucagon (rDNA), dextrose    Labs:   Recent Labs     03/08/21  0551 03/09/21  0549 03/10/21  0616   WBC 9.3 9.4 9.8   HGB 11.2* 10.9* 10.2*   HCT 34.1* 34.1* 31.8*    397 366     Recent Labs     03/08/21  0551 03/09/21  0549 03/10/21  0616    140 141   K 3.6 3.5 4.0    102 103   CO2 22 20 20   BUN 19 21 25*   CREATININE 0.60 0.64 0.64   CALCIUM 8.6 8.8 9.3     Recent Labs     03/08/21  0551 03/09/21  0549 03/10/21  0616   AST 16 27 61*   ALT 8 13 26   BILITOT 0.7 0.8* 0.9*   ALKPHOS 108 122 141*     No results for input(s): INR in the last 72 hours. No results for input(s): Kathi Drown in the last 72 hours. Urinalysis:   Lab Results   Component Value Date    NITRU Negative 03/04/2021    WBCUA 0-2 03/04/2021    BACTERIA Negative 03/04/2021    RBCUA 0-2 03/04/2021    BLOODU Negative 03/04/2021    SPECGRAV 1.015 03/04/2021    GLUCOSEU Negative 03/04/2021       Radiology:   Most recent    EEG No procedure found. MRI of Brain No results found for this or any previous visit. Results for orders placed during the hospital encounter of 03/04/21   MRI BRAIN WO CONTRAST    Narrative EXAMINATION: MRI BRAIN WO CONTRAST     CLINICAL HISTORY:  stroke     COMPARISONS: Brain MRI from August 20, 2015    TECHNIQUE:    Multiplanar multisequence images of the brain were obtained without contrast. Diffusion perfusion imaging was obtained. FINDINGS:      There are no extra-axial collections. There is no evidence of hemorrhage. There are no areas of perfusion diffusion signal abnormality to suggest ischemia. The susceptibility images do not demonstrate evidence of hemosiderin deposition within the brain   parenchyma or the leptomeninges. There is preservation of the gray-white matter differentiation. There are numerous foci of T2/FLAIR hyperintensities in the subcortical and periventricular white matter in a symmetric distribution throughout both hemispheres. There is prominence of the   sulci and ventricles consistent with moderate global cerebral atrophy and chronic involutional changes. There are dilated perivascular spaces versus lacunar infarcts in the bilateral basal ganglia. The midline structures are intact, the corpus callosum is within normal limits. The region of the pineal gland and the sella turcica are unremarkable. There are no space-occupying lesions in the posterior fossa. The basilar cisterns are patent. The craniocervical junction is unremarkable.      The visualized portions of the orbits are within normal limits, the globes are intact. The visualized portions of the paranasal sinuses are within normal limits. The calvarium and soft tissues are unremarkable. Impression    There are no acute intracranial changes, there is no evidence of hemorrhage or acute ischemia. MRA of the Head and Neck: No results found for this or any previous visit. No results found for this or any previous visit. No results found for this or any previous visit. CT of the Head:   Results for orders placed during the hospital encounter of 03/04/21   CT Head WO Contrast    Narrative CT Brain. Contrast medium:  without contrast.. History:  Confusion. Technical factors: CT imaging of the brain was obtained and formatted as 5 mm contiguous axial images. 2.5 mm contiguous axial images were obtained through the osseous structures. Sagittal and coronal reconstruction obtained during postprocessing. Comparison:  CT brain, September 16, 2020 MRI/MRA brain,  August 28, 2015. Findings:    Study limited secondary to motion artifact. Extra-axial spaces:  Normal.     Intracranial hemorrhage:  None. Ventricular system: Ventricles mildly enlarged. Sulci mildly prominent. Basal Cisterns:  Normal.    Cerebral Parenchyma: Bilateral symmetric periventricular areas decreased attenuation    Midline Shift:  None. Cerebellum:  Normal.     Paranasal sinuses and mastoid air cells:  Normal.    Visualized Orbits:  Normal.        Impression Impression:    Study limited secondary to motion artifact. No acute findings. Mild cerebral atrophy. Chronic ischemic white matter disease. All CT scans at this facility use dose modulation, iterative reconstruction, and/or weight based dosing when appropriate to reduce radiation dose to as low as reasonably achievable. No results found for this or any previous visit. No results found for this or any previous visit.     Carotid duplex: No results found for this or any previous visit. No results found for this or any previous visit. No results found for this or any previous visit. Echo No results found for this or any previous visit. Assessment/Plan:    Dementia with worsening with the possibility of underlying possible strokes which can occur in atrial fibrillation patient is not on anticoagulation patient has been falling. We will try and obtain an MRI of the brain if he can as she appears to be somewhat agitated we will perform this under sedation. Patient has gait ataxia with frontal gait disorder with significant dementia. I am awaiting her daughter for more history. Otherwise patient has a nonfocal examination but there may be some right-sided findings. Dementia with behavioral disturbances  Remeron has been initiated to help with sleep and appetite  New onset atrial fibrillation, oral anticoagulation not recommended due to patient's falls risk  Recurrent falls with multiple rib fractures  Right pleural effusion, IR consulted  MRI of the brain negative for any acute intracranial findings  Check B12 and folate  Palliative care on consult  Patient is now DNR CCA  Will follow    Dysphagia, n.p.o. Hospice consult pending    Patient remains profoundly confused with intermittent behavioral disturbances  N.p.o. secondary to dysphagia  Family has met with hospice and is deciding on hospice services  Will follow  Collaborating physician    I have personally performed a face to face diagnostic evaluation on this patient, reviewed all data and investigations, and am the sole provider of all clinical decisions on the neurological status of this patient. no new changes, some confusion      Alessandro Sanchez MD, Baltazar Gorman, American Board of Psychiatry & Neurology  Board Certified in Vascular Neurology  Board Certified in Neuromuscular Medicine  Certified in . [id number]   s: Dr Rebekah Sanchez    Electronically signed by Kel Royal, COLT - TALON on 3/10/2021 at 1:16 PM

## 2021-03-10 NOTE — PROGRESS NOTES
INPATIENT PROGRESS NOTES    PATIENT NAME: Isaías Bella  MRN: [id number]  SERVICE DATE:  March 10, 2021   SERVICE TIME:  10:20 AM      PRIMARY SERVICE: Pulmonary Disease    CHIEF COMPLAIN: Right pleural effusion      INTERVAL HPI: Patient seen and examined at bedside, Interval Notes, orders reviewed. Nursing notes noted  Patient is much more awake today. She is not having short of breath and she is comfortable in bed. She is on room air and O2 saturation 98%. Patient's daughter going to meet with hospice today. OBJECTIVE    Body mass index is 18.81 kg/m². PHYSICAL EXAM:  Vitals:  BP (!) 170/63   Pulse 87   Temp 97.9 °F (36.6 °C) (Axillary)   Resp 18   Ht 5' (1.524 m)   Wt 96 lb 4.8 oz (43.7 kg)   LMP  (LMP Unknown)   SpO2 98%   BMI 18.81 kg/m²   General: Alert, awake . comfortable in bed, No distress. Head: Atraumatic , Normocephalic   Eyes: PERRL. No sclera icterus. No conjunctival injection. No discharge   ENT: No nasal  discharge. Pharynx clear. Neck:  Trachea midline. No thyromegaly, no JVD, No cervical adenopathy. Chest : Bilaterally symmetrical ,Normal effort,  No accessory muscle use  Lung : Diminished breath sound at right base. No Rales. No wheezing. No rhonchi. Heart[de-identified] Normal  rate. Regular rhythm. No mumur ,  Rub or gallop  ABD: Non-tender. Non-distended. No masses. No organmegaly. Normal bowel sounds. No hernia.   Ext : No Pitting both leg , No Cyanosis No clubbing  Neuro: no focal weakness          DATA:   Recent Labs     03/09/21  0549 03/10/21  0616   WBC 9.4 9.8   HGB 10.9* 10.2*   HCT 34.1* 31.8*   MCV 93.1 96.4    366     Recent Labs     03/09/21  0549 03/10/21  0616    141   K 3.5 4.0    103   CO2 20 20   BUN 21 25*   CREATININE 0.64 0.64   GLUCOSE 138* 132*   CALCIUM 8.8 9.3   PROT 6.9 7.3   LABALBU 3.0* 2.8*   BILITOT 0.8* 0.9*   ALKPHOS 122 141*   AST 27 61*   ALT 13 26   LABGLOM >60.0 >60.0   GFRAA >60.0 >60.0   GLOB 3.9* 4.5*       MV Settings: regurgitation is noted. Normal tricuspid valve structure and function. There is mild ( 1 +) tricuspid regurgitation with estimated RVSP of 31 mm  Hg. Mildly dilated left atrium. Normal left ventricle structure and function. Impaired relaxation compatible with diastolic dysfunction. ( reversed E/A  ratio)  Moderate concentric left ventricular hypertrophy. Signature  ----------------------------------------------------------------  Electronically signed by Yesica Hutchinson(Interpreting physician)  on 03/05/2021 01:28 PM  ----------------------------------------------------------------  Findings Left Ventricle Normal left ventricle structure and function. Impaired relaxation compatible with diastolic dysfunction. ( reversed E/A ratio) Moderate concentric left ventricular hypertrophy. Right Ventricle Normal right ventricle structure and function. Left Atrium Mildly dilated left atrium. Right Atrium Normal right atrium. Mitral Valve Mitral valve leaflets are mildly thickened with preserved leaflet mobility. Tricuspid Valve Normal tricuspid valve structure and function. There is mild ( 1 +) tricuspid regurgitation with estimated RVSP of 31 mm Hg. Aortic Valve Aortic valve leaflets are mildly thickened. Trivial aortic regurgitation is noted. Pulmonic Valve Normal pulmonic valve structure and function. Pericardial Effusion No evidence of pericardial effusion. Pleural Effusion No evidence of pleural effusion. Aorta \ Miscellaneous Miscellaneous normal findings were found. M-Mode Measurements (cm)  LVIDd: 4.19 cm                         LVIDs: 2.64 cm  IVSd: 1.07 cm                          IVSs: 1.29 cm  LVPWd: 1.01 cm                         LVPWs: 1.33 cm  Rt. Vent.  Dimension: 3.26 cm           AO Root Dimension: 3.32 cm                                         ACS: 1.48 cm                                         LA: 3.12 cm Doppler Measurements:  AV Peak Gradient: 12.22 mmHg           MV Peak E-Wave: 0.68 m/s  AV Mean Gradient: 7.6 mmHg             MV Peak A-Wave: 1.05 m/s  TR Velocity:2.67 m/s  TR Gradient:28.61 mmHg                                         Estimated RAP:3 mmHg                                         RVSP:31.61 mmHg Valves  Mitral Valve  Peak E-Wave: 0.68 m/s                 Peak A-Wave: 1.05 m/s                                        E/A Ratio: 0.65                                        Peak Gradient: 1.85 mmHg                                        Deceleration Time: 260.1 msec  Tissue Doppler  E' Septal Velocity: 0.06 m/s  E' Lateral Velocity: 0.05 m/s  Aortic Valve  Peak Velocity: 1.75 m/s               Mean Velocity: 1.33 m/s  Peak Gradient: 12.22 mmHg             Mean Gradient: 7.6 mmHg  AV VTI: 32.27 cm  Cusp Separation: 1.48 cm  Tricuspid Valve  Estimated RVSP: 31.61 mmHg              Estimated RAP: 3 mmHg  TR Velocity: 2.67 m/s                   TR Gradient: 28.61 mmHg  Pulmonic Valve  Peak Velocity: 1.12 m/s           Peak Gradient: 5.02 mmHg                                    Estimated PASP: 31.61 mmHg  LVOT  Peak Velocity: 0.94 m/s              Mean Velocity: 0.6 m/s  Peak Gradient: 2.88 mmHg             Mean Gradient: 1.62 mmHg                                       LVOT VTI: 17.21 cm Structures  Left Atrium  LA Dimension: 3.12 cm                        LA Area: 14.08 cm^2  LA/Aorta: 0.94  LA Volume/Index: 37.82 ml /23 m^2  Left Ventricle  Diastolic Dimension: 2.14 cm        Systolic Dimension: 6.02 cm  Septum Diastolic: 7.74 cm           Septum Systolic: 3.41 cm  PW Diastolic: 4.13 cm               PW Systolic: 7.74 cm                                      FS: 37 %  LV EDV/LV EDV Index: 78 ml/48 m^2   LV ESV/LV ESV Index: 25.5 ml/16 m^2  EF Calculated: 67.3 %               LV Length: 5.44 cm  Right Atrium  RA Systolic Pressure: 3 mmHg  Right Ventricle  Diastolic Dimension: 6.44 cm                                    RV Systolic Pressure: 71.86 mmHg Aorta/ Miscellaneous Aorta  Aortic Root: 3.32 cm    Xr Acute Abd Series Chest 1 Vw    Result Date: 3/5/2021  XR ACUTE ABD SERIES CHEST 1 VW : 3/4/2021 CLINICAL HISTORY:  nausea and vomiting . COMPARISON: Portable chest 9/16/2020 and CT abdomen pelvis 2/15/2011. TECHNIQUE: An upright PA radiograph of the chest, and upright and supine radiographs of the abdomen and pelvis were obtained. FINDINGS: Several acute-appearing right mid to lower posterolateral rib fractures are noted, with a moderate-sized right pleural effusion and probable compressive right basilar atelectasis. There is no pneumothorax, significant left pleural effusion, cardiomegaly, or other acute fractures identified. Chronic healed deformity of the proximal right humerus, degenerative changes and mild rotary levoscoliosis of the lumbar spine appear unchanged. There is a normal bowel gas pattern. SEVERAL ACUTE RIGHT MID TO LOWER POSTEROLATERAL RIGHT RIB FRACTURES. MODERATE-SIZED RIGHT PLEURAL EFFUSION AND PROBABLE COMPRESSIVE ATELECTASIS. Ct Head Wo Contrast    Result Date: 3/4/2021  CT Brain. Contrast medium:  without contrast.. History:  Confusion. Technical factors: CT imaging of the brain was obtained and formatted as 5 mm contiguous axial images. 2.5 mm contiguous axial images were obtained through the osseous structures. Sagittal and coronal reconstruction obtained during postprocessing. Comparison:  CT brain, September 16, 2020 MRI/MRA brain,  August 28, 2015. Findings: Study limited secondary to motion artifact. Extra-axial spaces:  Normal. Intracranial hemorrhage:  None. Ventricular system: Ventricles mildly enlarged. Sulci mildly prominent. Basal Cisterns:  Normal. Cerebral Parenchyma: Bilateral symmetric periventricular areas decreased attenuation Midline Shift:  None. Cerebellum:  Normal. Paranasal sinuses and mastoid air cells:  Normal. Visualized Orbits:  Normal.     Impression: Study limited secondary to motion artifact. No acute findings. Mild cerebral atrophy.  Chronic ischemic white matter disease. All CT scans at this facility use dose modulation, iterative reconstruction, and/or weight based dosing when appropriate to reduce radiation dose to as low as reasonably achievable. Xr Chest Decubitus Right    Result Date: 3/6/2021  EXAMINATION:  XR CHEST DECUBITUS LEFT, XR CHEST DECUBITUS RIGHT CLINICAL HISTORY:  pleural effusion . Valerio Dirk COMPARISONS:  NONE AVAILABLE TECHNIQUE:  Bilateral decubitus films of the chest. 2 views are submitted. FINDINGS:  There is a right pleural effusion probably loculated as it does not appear to be free flowing. No left-sided pleural effusion. The field-of-view there is fractures of the lateral aspect of the right seventh eighth ninth and 10th ribs. RIGHT-SIDED PLEURAL EFFUSION WHICH IS PROBABLY LOCULATED AS IT IS NOT FREELY FLOWING. Xr Chest Decubitus Left    Result Date: 3/6/2021  EXAMINATION:  XR CHEST DECUBITUS LEFT, XR CHEST DECUBITUS RIGHT CLINICAL HISTORY:  pleural effusion . Valerio Dirsera COMPARISONS:  NONE AVAILABLE TECHNIQUE:  Bilateral decubitus films of the chest. 2 views are submitted. FINDINGS:  There is a right pleural effusion probably loculated as it does not appear to be free flowing. No left-sided pleural effusion. The field-of-view there is fractures of the lateral aspect of the right seventh eighth ninth and 10th ribs. RIGHT-SIDED PLEURAL EFFUSION WHICH IS PROBABLY LOCULATED AS IT IS NOT FREELY FLOWING. IMPRESSION AND SUGGESTION:  1. Right pleural effusion, loculated. 2. History of fall with multiple right rib fracture. 3. New onset A. Fib  4. Coronary  artery disease status post stent  5. Dementia    Patient had loculated right-sided pleural effusion which could be due to  trauma, she had right-sided rib fracture and possibility of hemothorax in the past.  Discussed with RN. Patient family going to have a hospice meeting today.   She is on room air O2 saturation 98%    NOTE: This report was transcribed using voice recognition software. Every effort was made to ensure accuracy; however, inadvertent computerized transcription errors may be present.       Electronically signed by Mayi Jansen MD, FCCP on 3/10/2021 at 10:19 AM

## 2021-03-10 NOTE — PROGRESS NOTES
Referral meeting with daughter, Aditya Jung in pt's bedroom. Hospice philosophy discussed. Family not ready to decide if they want Hospice Services at this time. RIGO Thomas and CC notified.

## 2021-03-10 NOTE — CARE COORDINATION
Per the patient's RN, the patient's daughter met with Crescencio Hewitt. Consents were not signed and the patient's daughter would like to talk with her brother (patient's son) regarding the discharge plan.  Electronically signed by Vivek Pak on 3/10/21 at 11:51 AM EST

## 2021-03-10 NOTE — PROGRESS NOTES
[facility]  Facility/Department: [facility]  Speech Language Pathology   Treatment Note          Mati Alan  2/10/1928  [id number]    Medical Dx: New onset a-fib [facility]) [I48.91]  New onset a-fib [facility]) [I48.91]  Speech Dx: Dysphagia     3/10/2021    Subjective:  Alert and Confused        Interventions used this date:  Dysphagia Treatment    Objective/Assessment:  Patient progressing towards goals:  Short-term Goals  Timeframe for Short-term Goals: 1-2 weeks  Goal 1: Pt will tolerate therapeutic PO food/liquid trials with consistencies to be determined by treating SLP without overt s/s of aspiration in all opportunities. Pt tolerated 5/5 controlled cup sips of thin liquids with adequate control and no overt s/s of aspiration. Pt exhibited difficulty with controlling the cup and taking sips by herself. Pt declined further PO trials this date following encouragement from SLP. SLP spoke to RIGO Thomas- Pt allowed cup sips of thin liquids with direct supervision when pt is fully alert. SLP to attempt 3/11/21 to assess tolerance for solids pending pt's alertness and participation. Continue NPO until further PO trials can be completed to assess tolerance for a PO diet. Goal 2: POC to be updated as appropriate. Dysphagia Goals: The patient will tolerate puree foods 10/10. Treatment/Activity Tolerance:  Patient limited by fatigue    Plan:  Continue per POC    Pain Assessment:  Initial Assessment:  Patient denies pain. Re-assessment:  Patient denies pain. Patient/Caregiver Education:  No education provided at this time. Safety Devices:   All fall risk precautions in place      Therapy Time  SLP Individual Minutes  Time In: 1405  Time Out: Alina 141  Minutes: 10            Signature: Electronically signed by CHARMAINE Horvath on 3/10/2021 at 2:23 PM

## 2021-03-10 NOTE — PROGRESS NOTES
Palliative Care Progress Note  Patient: Trupti Rider  Gender: female  YOB: 1928  Unit/Bed: M468/G974-44  Code Status: DNR-CCA  Inpatient Treatment Team: Treatment Team: Attending Provider: Karen Vazquez MD; Consulting Physician: Jannie Glover MD; Consulting Physician: Kiran Pantoja MD; Utilization Reviewer: Rhett Lauren RN; Consulting Physician: Nghia York MD; Consulting Physician: Saundra Dancer, MD; Utilization Reviewer: Yuri Tapia RN; : Geovanna Crowder, RN; Registered Nurse: Jimi Alvarez, RN; : IRWIN Martin; Registered Nurse: Jose David Garcia, RIGO  Admit Date:  3/4/2021    Chief Complaint: Goals of Care    History of Presenting Illness:      Trupti Rider is a 80 y.o. female on hospital day 6 with a history of  CAD (coronary artery disease), Chronic anemia, Dementia ([address] [place] (hard of hearing), Hyperlipidemia, Hypertension, and Type II or unspecified type diabetes mellitus presented with new onset Atrial Fibrillation. Several falls at home, right rib fracture, possible hemothorax. noted. Catherine Elliott Possible placement in SNF     She is sedated as she had an MRI this am. Currently profoundly confused, has a lap belt due to impulsive behavior and multiple attempts to climb out of bed. HX of frequent falls and on anticoagulation. Family state rapid mental status change, no infectious cause found, head CT negative. She is unable to participate in PT due to her unfortunate cognitive state. She is refusing most of her food and medicine. No dysphagia. Albumin 2.9     Prior to this incident she was incontinent of stool and urine, needed assistance for all ADLS, transferred with assistance per her daughter. 3/9/21     Lethargic, wakes to voice, speaks tangentially, does not respond to requests. Not oriented to self or place. Awaiting a SLP consult, still declining most food and water. Unable to participate in PT due to cognitive status.  She is on room air and oxygen sat is 98%, thus no surgical intervention will be done on pleural effusion due to frail state. PAIN AD zero, appears calm and comfortable, has not had to use haldol today. If she continues to refuse food and water and SLP cannot find a reason she will not eat, I recommend hospice care as family declines feeding tube. 3/10/21    Lethargic, confused. Pain Ad zero. No respiratory distress 98% on room air, She failed swallow study, declines most food and meds NPO, family does not want feeding tube, meeting with hospice today. Review of Systems:       Review of Systems   Unable to perform ROS: Dementia       Physical Examination:       BP (!) 170/63   Pulse 87   Temp 97.9 °F (36.6 °C) (Axillary)   Resp 18   Ht 5' (1.524 m)   Wt 96 lb 4.8 oz (43.7 kg)   LMP  (LMP Unknown)   SpO2 98%   BMI 18.81 kg/m²    Physical Exam  Constitutional:       General: She is not in acute distress. Appearance: She is cachectic. She is not diaphoretic. HENT:      Head: Normocephalic and atraumatic. Right Ear: External ear normal.      Left Ear: External ear normal.      Nose: Nose normal.      Mouth/Throat:      Pharynx: No oropharyngeal exudate. Eyes:      General: No scleral icterus. Right eye: No discharge. Left eye: No discharge. Conjunctiva/sclera: Conjunctivae normal.      Pupils: Pupils are equal, round, and reactive to light. Neck:      Musculoskeletal: Normal range of motion and neck supple. Thyroid: No thyromegaly. Vascular: No JVD. Trachea: No tracheal deviation. Cardiovascular:      Rate and Rhythm: Normal rate and regular rhythm. Heart sounds: Normal heart sounds. Pulmonary:      Effort: Pulmonary effort is normal. No respiratory distress. Breath sounds: No stridor. Decreased breath sounds present. No wheezing or rales. Chest:      Chest wall: No tenderness. Abdominal:      General: Bowel sounds are normal. There is no distension.       Palpations: Abdomen is soft. There is no mass. Tenderness: There is no abdominal tenderness. There is no guarding or rebound. Musculoskeletal: Normal range of motion. General: No tenderness or deformity. Lymphadenopathy:      Cervical: No cervical adenopathy. Skin:     General: Skin is warm and dry. Capillary Refill: Capillary refill takes less than 2 seconds. Coloration: Skin is pale. Findings: No erythema or rash. Neurological:      Mental Status: She is unresponsive. Psychiatric:         Attention and Perception: She is inattentive.          Speech: Speech is tangential.         Allergies:      No Known Allergies    Medications:      Current Facility-Administered Medications   Medication Dose Route Frequency Provider Last Rate Last Admin    cloNIDine (CATAPRES) 0.2 MG/24HR 1 patch  1 patch Transdermal Weekly Deborah Jackson MD   1 patch at 03/10/21 1100    metoprolol (LOPRESSOR) injection 2.5 mg  2.5 mg Intravenous Q6H PRN Leelee Greene MD        mirtazapine (REMERON) tablet 7.5 mg  7.5 mg Oral Nightly Virgen Patricia Sedar, DO   7.5 mg at 03/08/21 2231    haloperidol lactate (HALDOL) injection 1 mg  1 mg Intramuscular Q6H PRN Virgen Patricia Sedar, DO        sodium chloride flush 0.9 % injection 10 mL  10 mL Intravenous 2 times per day COLT Chong - CNP   10 mL at 03/10/21 0816    sodium chloride flush 0.9 % injection 10 mL  10 mL Intravenous PRN Raudel Galvan APRN - CNP        promethazine (PHENERGAN) tablet 12.5 mg  12.5 mg Oral Q6H PRN COLT Chong CNP        Or    ondansetron [facility]) injection 4 mg  4 mg Intravenous Q6H PRN Raudel Galvan, APRN - CNP        polyethylene glycol (GLYCOLAX) packet 17 g  17 g Oral Daily PRN Raudel Galvan APRN - CNP        acetaminophen (TYLENOL) tablet 650 mg  650 mg Oral Q6H PRN Raudel Galvan APRN - CNP   650 mg at 03/07/21 2021    Or    acetaminophen (TYLENOL) suppository 650 mg 650 mg Rectal Q6H PRN COLT Salter - CNP        glucose (GLUTOSE) 40 % oral gel 15 g  15 g Oral PRN Lars Gomez, APRN - CNP        dextrose 50 % IV solution  12.5 g Intravenous PRN Lars Gomez, APRN - CNP        glucagon (rDNA) injection 1 mg  1 mg Intramuscular PRN Lars Gomez, APRN - CNP        dextrose 5 % solution  100 mL/hr Intravenous PRN Lars Gomez, APRN - CNP        insulin lispro (HUMALOG) injection vial 0-12 Units  0-12 Units Subcutaneous TID WC Lars Gomez, APRN - CNP        insulin lispro (HUMALOG) injection vial 0-6 Units  0-6 Units Subcutaneous Nightly Lars Gomez APRN - CNP   1 Units at 03/07/21 2030       History: PMHx:  Past Medical History:   Diagnosis Date    CAD (coronary artery disease)     Chronic anemia     Dementia ([facility] [address]     [place] (hard of hearing)     Hyperlipidemia     Hypertension     Type II or unspecified type diabetes mellitus without mention of complication, not stated as uncontrolled        PSHx:  Past Surgical History:   Procedure Laterality Date    BLADDER SUSPENSION      CARDIAC CATHETERIZATION      CATARACT REMOVAL      CHOLECYSTECTOMY  1990    COLONOSCOPY  1-07    CORONARY ANGIOPLASTY WITH STENT PLACEMENT      CORONARY ANGIOPLASTY WITH STENT PLACEMENT  5-08    FOOT SURGERY      L    HYSTERECTOMY  1964       Social Hx:  Social History     Socioeconomic History    Marital status:       Spouse name: None    Number of children: None    Years of education: None    Highest education level: None   Occupational History    None   Social Needs    Financial resource strain: None    Food insecurity     Worry: None     Inability: None    Transportation needs     Medical: None     Non-medical: None   Tobacco Use    Smoking status: Never Smoker    Smokeless tobacco: Never Used   Substance and Sexual Activity    Alcohol use: No    Drug use: No    Sexual activity: CALCIUM 9.3 03/10/2021    BILITOT 0.9 03/10/2021    ALKPHOS 141 03/10/2021    AST 61 03/10/2021    ALT 26 03/10/2021     BMP:    Lab Results   Component Value Date     03/10/2021    K 4.0 03/10/2021     03/10/2021    CO2 20 03/10/2021    BUN 25 03/10/2021    LABALBU 2.8 03/10/2021    LABALBU 4.2 11/07/2011    CREATININE 0.64 03/10/2021    CALCIUM 9.3 03/10/2021    GFRAA >60.0 03/10/2021    LABGLOM >60.0 03/10/2021    GLUCOSE 132 03/10/2021    GLUCOSE 121 11/07/2011     TSH:   Lab Results   Component Value Date    TSH 1.730 03/09/2019     Vitamin B12 and Folate: No components found for: FOLIC,  L87  Urinalysis:   Lab Results   Component Value Date    NITRU Negative 03/04/2021    WBCUA 0-2 03/04/2021    BACTERIA Negative 03/04/2021    RBCUA 0-2 03/04/2021    BLOODU Negative 03/04/2021    SPECGRAV 1.015 03/04/2021    GLUCOSEU Negative 03/04/2021           FUNCTIONAL ADL´S:     Independent: [  ] Eating, [   ] Dressing, [   ] Transferring, [   ] Geroge Borders, [   ] Ardine Slate, [   ] Continence  Dependent   : x  ] Eating, [   x] Dressing, [   ] Transferring, [ x  ] Toileting, [x ] Bathing, [  x ]x Continence  W. assistant : [  ] Eating, [   ] Dressing, [   ] Transferring, [   ] Geroge Borders, [   ] Bathing, [   ] Continence    Radiology: Reviewed      Xr Chest Decubitus Right    Result Date: 3/6/2021  EXAMINATION:  XR CHEST DECUBITUS LEFT, XR CHEST DECUBITUS RIGHT CLINICAL HISTORY:  pleural effusion . COMPARISONS:  NONE AVAILABLE TECHNIQUE:  Bilateral decubitus films of the chest. 2 views are submitted. FINDINGS:  There is a right pleural effusion probably loculated as it does not appear to be free flowing. No left-sided pleural effusion. The field-of-view there is fractures of the lateral aspect of the right seventh eighth ninth and 10th ribs. RIGHT-SIDED PLEURAL EFFUSION WHICH IS PROBABLY LOCULATED AS IT IS NOT FREELY FLOWING.     Xr Chest Decubitus Left    Result Date: 3/6/2021  EXAMINATION:  XR CHEST DECUBITUS LEFT, XR CHEST DECUBITUS RIGHT CLINICAL HISTORY:  pleural effusion . COMPARISONS:  NONE AVAILABLE TECHNIQUE:  Bilateral decubitus films of the chest. 2 views are submitted. FINDINGS:  There is a right pleural effusion probably loculated as it does not appear to be free flowing. No left-sided pleural effusion. The field-of-view there is fractures of the lateral aspect of the right seventh eighth ninth and 10th ribs. RIGHT-SIDED PLEURAL EFFUSION WHICH IS PROBABLY LOCULATED AS IT IS NOT FREELY FLOWING. Mri Brain Wor Contrast    Result Date: 3/8/2021  EXAMINATION: MRI BRAIN WO CONTRAST CLINICAL HISTORY:  stroke COMPARISONS: Brain MRI from August 20, 2015 TECHNIQUE: Multiplanar multi sequence images of the brain were obtained without contrast. Diffusion perfusion imaging was obtained. FINDINGS:  There are no extra-axial collections. There is no evidence of hemorrhage. There are no areas of perfusion diffusion signal abnormality to suggest ischemia. The susceptibility images do not demonstrate evidence of hemosiderin deposition within the brain parenchyma or the leptomeninges. There is preservation of the gray-white matter differentiation. There are numerous foci of T2/FLAIR hyperintensities in the subcortical and periventricular white matter in a symmetric distribution throughout both hemispheres. There is prominence of the sulci and ventricles consistent with moderate global cerebral atrophy and chronic involutional changes. There are dilated perivascular spaces versus lacunar infarcts in the bilateral basal ganglia. The midline structures are intact, the corpus callosum is within normal limits. The region of the pineal gland and the sella turcica are unremarkable. There are no space-occupying lesions in the posterior fossa. The basilar cisterns are patent. The craniocervical junction is unremarkable. The visualized portions of the orbits are within normal limits, the globes are intact.  The visualized portions of the paranasal sinuses are within normal limits. The calvarium and soft tissues are unremarkable. There are no acute intracranial changes, there is no evidence of hemorrhage or acute ischemia. Assessment and plan:      -Advance Care Planning  Discussed goals of care with her daughter Scott Dumas. Explained in extensive detail nuances between full code, DNR CCA and DNR CC. Scott Dumas has made the decision to be:    DNR CCA  No feeding tube      Palliative Performance Scale 10  Estimated Fast 7d    Due to advanced dementia, Albumin 2.9, unable to maintain hydration and nutrition,inability to participate in rehab, and pneumothorax She would qualify for hospice care at this point      -Goals of Care Discussion:  Disease process and typical progression and goals of treatment were discussed in basic terms. Nitza's  Goal for her mother is to optimize available comfort care measures to decrease hospitalizations and maximize function. We discussed the palliative care philosophy in light of those goals. We discussed all care options contingent on treatment response and QOL. Much active listening, presence, and emotional support were given. Anorexia  Continue Mirtazapine 7.5 mg po at [facility] [place]  Consider nutritional supplements  Snacks at bedside      Parish Weaver is being treated by other specialties while hospitalized for the following:  Multiple closed fractures of ribs of right side [S22.41XA]       New onset a-fib ([address] [I48.91] 03/04/2021    Nausea & vomiting [R11.2] 03/04/2021    Falls [W19. XXXA] 03/04/2021    General weakness [R53.1] 03/04/2021    Hyponatremia [E87.1] 03/04/2021    Pleural effusion, right [J90] 03/04/2021    Dementia ([address] [F03.90]      Type 2 diabetes mellitus with complication ([address] [C32.8] 12/16/2015    CAD (coronary artery disease) [I25.10]            If family does not elect hospice or Parish Weaver improves she would benefit form outpatient palliative care treatment.     Thank you for allowing me to participate in [address].         Electronically signed by COLT Chamorro CNP on 3/10/2021 at 1:04 PM

## 2021-03-10 NOTE — PROGRESS NOTES
[facility] Occupational Therapy      Date: 3/10/2021  Patient Name: Marian Silva        MRN: [id number]  Account: [de-identified]   : 2/10/1928  (80 y.o.)  Room: [place] [address]    Chart reviewed, [phone]. Patient not seen 2° to:    Hold per nursing request due to: pending hospice referral and angitation at this time    Spoke to Tomorrow, RN aware. Will attempt again when able.     Electronically signed by VIOLETTE Leung on  at 10:42 AM

## 2021-03-11 PROBLEM — E43 SEVERE MALNUTRITION (HCC): Status: ACTIVE | Noted: 2021-03-11

## 2021-03-11 LAB
ALBUMIN SERPL-MCNC: 3 G/DL (ref 3.5–4.6)
ALP BLD-CCNC: 202 U/L (ref 40–130)
ALT SERPL-CCNC: 39 U/L (ref 0–33)
ANION GAP SERPL CALCULATED.3IONS-SCNC: 15 MEQ/L (ref 9–15)
AST SERPL-CCNC: 71 U/L (ref 0–35)
BASOPHILS ABSOLUTE: 0.1 K/UL (ref 0–0.2)
BASOPHILS RELATIVE PERCENT: 0.6 %
BILIRUB SERPL-MCNC: 1 MG/DL (ref 0.2–0.7)
BUN BLDV-MCNC: 29 MG/DL (ref 8–23)
CALCIUM SERPL-MCNC: 9.5 MG/DL (ref 8.5–9.9)
CHLORIDE BLD-SCNC: 105 MEQ/L (ref 95–107)
CO2: 22 MEQ/L (ref 20–31)
CREAT SERPL-MCNC: 0.54 MG/DL (ref 0.5–0.9)
EOSINOPHILS ABSOLUTE: 0.3 K/UL (ref 0–0.7)
EOSINOPHILS RELATIVE PERCENT: 3.1 %
GFR AFRICAN AMERICAN: >60
GFR NON-AFRICAN AMERICAN: >60
GLOBULIN: 4.6 G/DL (ref 2.3–3.5)
GLUCOSE BLD-MCNC: 105 MG/DL (ref 60–115)
GLUCOSE BLD-MCNC: 121 MG/DL (ref 60–115)
GLUCOSE BLD-MCNC: 123 MG/DL (ref 60–115)
GLUCOSE BLD-MCNC: 146 MG/DL (ref 70–99)
GLUCOSE BLD-MCNC: 154 MG/DL (ref 60–115)
GLUCOSE BLD-MCNC: 178 MG/DL (ref 60–115)
GLUCOSE BLD-MCNC: 206 MG/DL (ref 60–115)
HCT VFR BLD CALC: 32.3 % (ref 37–47)
HEMOGLOBIN: 10.9 G/DL (ref 12–16)
LYMPHOCYTES ABSOLUTE: 1 K/UL (ref 1–4.8)
LYMPHOCYTES RELATIVE PERCENT: 10.8 %
MCH RBC QN AUTO: 31.1 PG (ref 27–31.3)
MCHC RBC AUTO-ENTMCNC: 33.7 % (ref 33–37)
MCV RBC AUTO: 92.5 FL (ref 82–100)
MONOCYTES ABSOLUTE: 0.8 K/UL (ref 0.2–0.8)
MONOCYTES RELATIVE PERCENT: 8.5 %
NEUTROPHILS ABSOLUTE: 7.3 K/UL (ref 1.4–6.5)
NEUTROPHILS RELATIVE PERCENT: 77 %
PDW BLD-RTO: 14.9 % (ref 11.5–14.5)
PERFORMED ON: ABNORMAL
PERFORMED ON: NORMAL
PLATELET # BLD: 412 K/UL (ref 130–400)
POTASSIUM REFLEX MAGNESIUM: 3.7 MEQ/L (ref 3.4–4.9)
RBC # BLD: 3.49 M/UL (ref 4.2–5.4)
SODIUM BLD-SCNC: 142 MEQ/L (ref 135–144)
TOTAL PROTEIN: 7.6 G/DL (ref 6.3–8)
WBC # BLD: 9.5 K/UL (ref 4.8–10.8)

## 2021-03-11 PROCEDURE — 99232 SBSQ HOSP IP/OBS MODERATE 35: CPT | Performed by: INTERNAL MEDICINE

## 2021-03-11 PROCEDURE — 36415 COLL VENOUS BLD VENIPUNCTURE: CPT

## 2021-03-11 PROCEDURE — 2580000003 HC RX 258: Performed by: NURSE PRACTITIONER

## 2021-03-11 PROCEDURE — 2060000000 HC ICU INTERMEDIATE R&B

## 2021-03-11 PROCEDURE — 99231 SBSQ HOSP IP/OBS SF/LOW 25: CPT | Performed by: NURSE PRACTITIONER

## 2021-03-11 PROCEDURE — 92526 ORAL FUNCTION THERAPY: CPT

## 2021-03-11 PROCEDURE — 85025 COMPLETE CBC W/AUTO DIFF WBC: CPT

## 2021-03-11 PROCEDURE — 99233 SBSQ HOSP IP/OBS HIGH 50: CPT | Performed by: PSYCHIATRY & NEUROLOGY

## 2021-03-11 PROCEDURE — 80053 COMPREHEN METABOLIC PANEL: CPT

## 2021-03-11 PROCEDURE — 6370000000 HC RX 637 (ALT 250 FOR IP): Performed by: NURSE PRACTITIONER

## 2021-03-11 RX ADMIN — Medication 10 ML: at 22:15

## 2021-03-11 RX ADMIN — INSULIN LISPRO 2 UNITS: 100 INJECTION, SOLUTION INTRAVENOUS; SUBCUTANEOUS at 11:26

## 2021-03-11 RX ADMIN — INSULIN LISPRO 4 UNITS: 100 INJECTION, SOLUTION INTRAVENOUS; SUBCUTANEOUS at 16:15

## 2021-03-11 RX ADMIN — Medication 10 ML: at 08:12

## 2021-03-11 RX ADMIN — ACETAMINOPHEN 650 MG: 325 TABLET ORAL at 08:56

## 2021-03-11 RX ADMIN — ACETAMINOPHEN 650 MG: 325 TABLET ORAL at 16:06

## 2021-03-11 NOTE — PROGRESS NOTES
Palliative Care Progress Note  Patient: Isaías Bella  Gender: female  YOB: 1928  Unit/Bed: J184/Y228-91  Code Status: DNR-CCA  Inpatient Treatment Team: Treatment Team: Attending Provider: Brianne Joseph MD; Consulting Physician: Dada Fuller MD; Consulting Physician: Jennifer Yao MD; Utilization Reviewer: Dolores Wild, RN; Consulting Physician: Domenica Bloch, MD; Consulting Physician: Tanika Montes MD; Utilization Reviewer: Danilo Cunningham RN; Patient Care Tech: Manus Senait; Patient Care Tech: Emy Ronnie; : IRWIN Padilla; Registered Nurse: Marco Singh, RN; Registered Nurse: Hussein Nunes, RIGO; : Panda Choudhary RN  Admit Date:  3/4/2021    Chief Complaint: Goals of Care    History of Presenting Illness:      Isaías Bella is a 80 y.o. female on hospital day 7 with a history of  CAD (coronary artery disease), Chronic anemia, Dementia ([address] [place] (hard of hearing), Hyperlipidemia, Hypertension, and Type II or unspecified type diabetes mellitus presented with new onset Atrial Fibrillation. Several falls at home, right rib fracture, possible hemothorax. noted. Fidel Blount Possible placement in SNF     She is sedated as she had an MRI this am. Currently profoundly confused, has a lap belt due to impulsive behavior and multiple attempts to climb out of bed. HX of frequent falls and on anticoagulation. Family state rapid mental status change, no infectious cause found, head CT negative. She is unable to participate in PT due to her unfortunate cognitive state. She is refusing most of her food and medicine. No dysphagia. Albumin 2.9     Prior to this incident she was incontinent of stool and urine, needed assistance for all ADLS, transferred with assistance per her daughter. 3/9/21     Lethargic, wakes to voice, speaks tangentially, does not respond to requests. Not oriented to self or place.  Awaiting a SLP consult, still declining most food and water.Unable to participate in PT due to cognitive status. She is on room air and oxygen sat is 98%, thus no surgical intervention will be done on pleural effusion due to frail state. PAIN AD zero, appears calm and comfortable, has not had to use haldol today. If she continues to refuse food and water and SLP cannot find a reason she will not eat, I recommend hospice care as family declines feeding tube. 3/10/21    Lethargic, confused. Pain Ad zero. No respiratory distress 98% on room air, She failed swallow study, declines most food and meds NPO, family does not want feeding tube, meeting with hospice today. 3/11/21     Her daughter met with hospice and is discussing it with other family members prior to making a decision. Jamaal Lujan is not a surgical candidate for pleural effusion. SLP still recommends NPO. Jamaal Lujan appears calm and comfortable. She denies pain, resps even and unlabored. She is cooperative, but speaks in tangents. Attempted to call her daughter Chau Crowder to offer support and answer questions, LVM. Review of Systems:       Review of Systems   Unable to perform ROS: Dementia       Physical Examination:       BP (!) 180/79   Pulse 97   Temp 97.5 °F (36.4 °C) (Oral)   Resp 18   Ht 5' (1.524 m)   Wt 97 lb 4.8 oz (44.1 kg)   LMP  (LMP Unknown)   SpO2 100%   BMI 19.00 kg/m²    Physical Exam  Constitutional:       General: She is not in acute distress. Appearance: She is cachectic. She is not diaphoretic. HENT:      Head: Normocephalic and atraumatic. Right Ear: External ear normal.      Left Ear: External ear normal.      Nose: Nose normal.      Mouth/Throat:      Pharynx: No oropharyngeal exudate. Eyes:      General: No scleral icterus. Right eye: No discharge. Left eye: No discharge. Conjunctiva/sclera: Conjunctivae normal.      Pupils: Pupils are equal, round, and reactive to light. Neck:      Musculoskeletal: Normal range of motion and neck supple. Thyroid: No thyromegaly. Vascular: No JVD. Trachea: No tracheal deviation. Cardiovascular:      Rate and Rhythm: Normal rate and regular rhythm. Heart sounds: Normal heart sounds. Pulmonary:      Effort: Pulmonary effort is normal. No respiratory distress. Breath sounds: No stridor. Decreased breath sounds present. No wheezing or rales. Chest:      Chest wall: No tenderness. Abdominal:      General: Bowel sounds are normal. There is no distension. Palpations: Abdomen is soft. There is no mass. Tenderness: There is no abdominal tenderness. There is no guarding or rebound. Musculoskeletal: Normal range of motion. General: No tenderness or deformity. Lymphadenopathy:      Cervical: No cervical adenopathy. Skin:     General: Skin is warm and dry. Capillary Refill: Capillary refill takes less than 2 seconds. Coloration: Skin is pale. Findings: No erythema or rash. Neurological:      Mental Status: She is unresponsive. Psychiatric:         Attention and Perception: She is inattentive.          Speech: Speech is tangential.         Allergies:      No Known Allergies    Medications:      Current Facility-Administered Medications   Medication Dose Route Frequency Provider Last Rate Last Admin    cloNIDine (CATAPRES) 0.2 MG/24HR 1 patch  1 patch Transdermal Weekly Osiris Blount MD   1 patch at 03/10/21 1100    metoprolol (LOPRESSOR) injection 2.5 mg  2.5 mg Intravenous Q6H PRN Celine Ferraro MD   2.5 mg at 03/10/21 2309    mirtazapine (REMERON) tablet 7.5 mg  7.5 mg Oral Nightly Kristian Ureña, DO   7.5 mg at 03/10/21 2010    haloperidol lactate (HALDOL) injection 1 mg  1 mg Intramuscular Q6H PRN Kristian Ureña, DO        sodium chloride flush 0.9 % injection 10 mL  10 mL Intravenous 2 times per day COLT Hopper - CNP   10 mL at 03/11/21 9840    sodium chloride flush 0.9 % injection 10 mL  10 mL Intravenous PRN Princess Vizcarra APRN - CNP        promethazine (PHENERGAN) tablet 12.5 mg  12.5 mg Oral Q6H PRN Yobani Husain, APRN - CNP        Or    ondansetron [facility]) injection 4 mg  4 mg Intravenous Q6H PRN Yobani Husain, APRN - CNP        polyethylene glycol (GLYCOLAX) packet 17 g  17 g Oral Daily PRN Yobani Husain, APRN - CNP        acetaminophen (TYLENOL) tablet 650 mg  650 mg Oral Q6H PRN Yobani Husain, APRN - CNP   650 mg at 03/11/21 4186    Or    acetaminophen (TYLENOL) suppository 650 mg  650 mg Rectal Q6H PRN Yobani Husain, APRN - CNP        glucose (GLUTOSE) 40 % oral gel 15 g  15 g Oral PRN Yobani Husain, APRN - CNP        dextrose 50 % IV solution  12.5 g Intravenous PRN Yobani Husain, APRN - CNP        glucagon (rDNA) injection 1 mg  1 mg Intramuscular PRN Yobani Husain, COLT - CNP        dextrose 5 % solution  100 mL/hr Intravenous PRN Yobani Husain, APRN - CNP        insulin lispro (HUMALOG) injection vial 0-12 Units  0-12 Units Subcutaneous TID WC Yobani Husain, APRN - CNP   2 Units at 03/11/21 1126    insulin lispro (HUMALOG) injection vial 0-6 Units  0-6 Units Subcutaneous Nightly Yobani Husain, APRN - CNP   2 Units at 03/10/21 2020       History:       PMHx:  Past Medical History:   Diagnosis Date    CAD (coronary artery disease)     Chronic anemia     Dementia ([address]     [place] (hard of hearing)     Hyperlipidemia     Hypertension     Type II or unspecified type diabetes mellitus without mention of complication, not stated as uncontrolled        PSHx:  Past Surgical History:   Procedure Laterality Date    BLADDER SUSPENSION      CARDIAC CATHETERIZATION      CATARACT REMOVAL      CHOLECYSTECTOMY  1990    COLONOSCOPY  1-07    CORONARY ANGIOPLASTY WITH STENT PLACEMENT      CORONARY ANGIOPLASTY WITH STENT PLACEMENT  5-08    FOOT SURGERY      L    HYSTERECTOMY  1964       Social Hx:  Social History Socioeconomic History    Marital status:       Spouse name: None    Number of children: None    Years of education: None    Highest education level: None   Occupational History    None   Social Needs    Financial resource strain: None    Food insecurity     Worry: None     Inability: None    Transportation needs     Medical: None     Non-medical: None   Tobacco Use    Smoking status: Never Smoker    Smokeless tobacco: Never Used   Substance and Sexual Activity    Alcohol use: No    Drug use: No    Sexual activity: None   Lifestyle    Physical activity     Days per week: None     Minutes per session: None    Stress: None   Relationships    Social connections     Talks on phone: None     Gets together: None     Attends Synagogue service: None     Active member of club or organization: None     Attends meetings of clubs or organizations: None     Relationship status: None    Intimate partner violence     Fear of current or ex partner: None     Emotionally abused: None     Physically abused: None     Forced sexual activity: None   Other Topics Concern    None   Social History Narrative    None       Family Hx:  Family History   Problem Relation Age of Onset    Heart Disease Mother     Diabetes Mother     Cancer Father         LUNG       LABS: Reviewed     CBC:  Lab Results   Component Value Date    WBC 9.5 03/11/2021    RBC 3.49 03/11/2021    RBC 4.05 11/07/2011    HGB 10.9 03/11/2021    HCT 32.3 03/11/2021    MCV 92.5 03/11/2021    MCH 31.1 03/11/2021    MCHC 33.7 03/11/2021    RDW 14.9 03/11/2021     03/11/2021    MPV 8.4 09/01/2015     CBC with Differential:   Lab Results   Component Value Date    WBC 9.5 03/11/2021    RBC 3.49 03/11/2021    RBC 4.05 11/07/2011    HGB 10.9 03/11/2021    HCT 32.3 03/11/2021     03/11/2021    MCV 92.5 03/11/2021    MCH 31.1 03/11/2021    MCHC 33.7 03/11/2021    RDW 14.9 03/11/2021    BANDSPCT 5 03/10/2015    LYMPHOPCT 10.8 03/11/2021 MONOPCT 8.5 03/11/2021    EOSPCT 20.5 11/07/2011    BASOPCT 0.6 03/11/2021    MONOSABS 0.8 03/11/2021    LYMPHSABS 1.0 03/11/2021    EOSABS 0.3 03/11/2021    BASOSABS 0.1 03/11/2021     CMP:    Lab Results   Component Value Date     03/11/2021    K 3.7 03/11/2021     03/11/2021    CO2 22 03/11/2021    BUN 29 03/11/2021    CREATININE 0.54 03/11/2021    GFRAA >60.0 03/11/2021    LABGLOM >60.0 03/11/2021    GLUCOSE 146 03/11/2021    GLUCOSE 121 11/07/2011    PROT 7.6 03/11/2021    LABALBU 3.0 03/11/2021    LABALBU 4.2 11/07/2011    CALCIUM 9.5 03/11/2021    BILITOT 1.0 03/11/2021    ALKPHOS 202 03/11/2021    AST 71 03/11/2021    ALT 39 03/11/2021     BMP:    Lab Results   Component Value Date     03/11/2021    K 3.7 03/11/2021     03/11/2021    CO2 22 03/11/2021    BUN 29 03/11/2021    LABALBU 3.0 03/11/2021    LABALBU 4.2 11/07/2011    CREATININE 0.54 03/11/2021    CALCIUM 9.5 03/11/2021    GFRAA >60.0 03/11/2021    LABGLOM >60.0 03/11/2021    GLUCOSE 146 03/11/2021    GLUCOSE 121 11/07/2011     TSH:   Lab Results   Component Value Date    TSH 1.730 03/09/2019     Vitamin B12 and Folate: No components found for: FOLIC,  A59  Urinalysis:   Lab Results   Component Value Date    NITRU Negative 03/04/2021    WBCUA 0-2 03/04/2021    BACTERIA Negative 03/04/2021    RBCUA 0-2 03/04/2021    BLOODU Negative 03/04/2021    SPECGRAV 1.015 03/04/2021    GLUCOSEU Negative 03/04/2021           FUNCTIONAL ADL´S:     Independent: [  ] Eating, [   ] Dressing, [   ] Transferring, [   ] Edilson Pikes, [   ] Bathing, [   ] Continence  Dependent   : x  ] Eating, [   x] Dressing, [   ] Transferring, [ x  ] Toileting, [x ] Bathing, [  x ]x Continence  W. assistant : [  ] Eating, [   ] Dressing, [   ] Transferring, [   ] Edilson Pikes, [   ] Cheril Car, [   ] Continence    Radiology: Reviewed      Xr Chest Decubitus Right    Result Date: 3/6/2021  EXAMINATION:  XR CHEST DECUBITUS LEFT, XR CHEST DECUBITUS RIGHT CLINICAL HISTORY: pleural effusion . COMPARISONS:  NONE AVAILABLE TECHNIQUE:  Bilateral decubitus films of the chest. 2 views are submitted. FINDINGS:  There is a right pleural effusion probably loculated as it does not appear to be free flowing. No left-sided pleural effusion. The field-of-view there is fractures of the lateral aspect of the right seventh eighth ninth and 10th ribs. RIGHT-SIDED PLEURAL EFFUSION WHICH IS PROBABLY LOCULATED AS IT IS NOT FREELY FLOWING. Xr Chest Decubitus Left    Result Date: 3/6/2021  EXAMINATION:  XR CHEST DECUBITUS LEFT, XR CHEST DECUBITUS RIGHT CLINICAL HISTORY:  pleural effusion . COMPARISONS:  NONE AVAILABLE TECHNIQUE:  Bilateral decubitus films of the chest. 2 views are submitted. FINDINGS:  There is a right pleural effusion probably loculated as it does not appear to be free flowing. No left-sided pleural effusion. The field-of-view there is fractures of the lateral aspect of the right seventh eighth ninth and 10th ribs. RIGHT-SIDED PLEURAL EFFUSION WHICH IS PROBABLY LOCULATED AS IT IS NOT FREELY FLOWING. Mri Brain Wor Contrast    Result Date: 3/8/2021  EXAMINATION: MRI BRAIN WO CONTRAST CLINICAL HISTORY:  stroke COMPARISONS: Brain MRI from August 20, 2015 TECHNIQUE: Multiplanar multi sequence images of the brain were obtained without contrast. Diffusion perfusion imaging was obtained. FINDINGS:  There are no extra-axial collections. There is no evidence of hemorrhage. There are no areas of perfusion diffusion signal abnormality to suggest ischemia. The susceptibility images do not demonstrate evidence of hemosiderin deposition within the brain parenchyma or the leptomeninges. There is preservation of the gray-white matter differentiation. There are numerous foci of T2/FLAIR hyperintensities in the subcortical and periventricular white matter in a symmetric distribution throughout both hemispheres.  There is prominence of the sulci and ventricles consistent with moderate global cerebral atrophy and chronic involutional changes. There are dilated perivascular spaces versus lacunar infarcts in the bilateral basal ganglia. The midline structures are intact, the corpus callosum is within normal limits. The region of the pineal gland and the sella turcica are unremarkable. There are no space-occupying lesions in the posterior fossa. The basilar cisterns are patent. The craniocervical junction is unremarkable. The visualized portions of the orbits are within normal limits, the globes are intact. The visualized portions of the paranasal sinuses are within normal limits. The calvarium and soft tissues are unremarkable. There are no acute intracranial changes, there is no evidence of hemorrhage or acute ischemia. Assessment and plan:      -Advance Care Planning  Discussed goals of care with her daughter Artie Hurd. Explained in extensive detail nuances between full code, DNR CCA and DNR CC. Artie Hurd has made the decision to be:    DNR CCA  No feeding tube      Palliative Performance Scale 10  Estimated Fast 7d    Due to advanced dementia, Albumin 2.9, unable to maintain hydration and nutrition,inability to participate in rehab, and pneumothorax She would qualify for hospice care at this point      -Goals of Care Discussion:  Disease process and typical progression and goals of treatment were discussed in basic terms. Nitza's  Goal for her mother is to optimize available comfort care measures to decrease hospitalizations and maximize function. We discussed the palliative care philosophy in light of those goals. We discussed all care options contingent on treatment response and QOL. Much active listening, presence, and emotional support were given.      Anorexia  Continue Mirtazapine 7.5 mg po at [facility]  Consider nutritional supplements  Snacks at bedside      Yolis Fernnadez is being treated by other specialties while hospitalized for the following:  Multiple closed fractures of ribs of right side [S22.41XA]       New onset a-fib ([facility] [I48.91] 03/04/2021    Nausea & vomiting [R11.2] 03/04/2021    Falls [W19. XXXA] 03/04/2021    General weakness [R53.1] 03/04/2021    Hyponatremia [E87.1] 03/04/2021    Pleural effusion, right [J90] 03/04/2021    Dementia ([facility] [F03.90]      Type 2 diabetes mellitus with complication ([facility] [O46.9] 12/16/2015    CAD (coronary artery disease) [I25.10]            If family does not elect hospice or [place] improves she would benefit form outpatient palliative care treatment. Thank you for allowing me to participate in [address] [place] [address].         Electronically signed by COLT Jeronimo CNP on 3/11/2021 at 1:00 PM

## 2021-03-11 NOTE — PROGRESS NOTES
Progress Note  Patient: Carlton Turner  Unit/Bed: J003/C252-09  YOB: 1928  MRN: [id number]  Acct: [de-identified]   Admitting Diagnosis: New onset a-fib [facility]) [I48.91]  New onset a-fib [facility]) [I48.91]  Admit Date:  3/4/2021  Hospital Day: 7    Chief Complaint:AF MS changes Pleural effusion    Histories:  Past Medical History:   Diagnosis Date    CAD (coronary artery disease)     Chronic anemia     Dementia ([address]     [place] (hard of hearing)     Hyperlipidemia     Hypertension     Type II or unspecified type diabetes mellitus without mention of complication, not stated as uncontrolled      Past Surgical History:   Procedure Laterality Date    BLADDER SUSPENSION      CARDIAC CATHETERIZATION      CATARACT REMOVAL      CHOLECYSTECTOMY  1990    COLONOSCOPY  1-07    CORONARY ANGIOPLASTY WITH STENT PLACEMENT      CORONARY ANGIOPLASTY WITH STENT PLACEMENT  5-08    FOOT SURGERY      L    HYSTERECTOMY  1964     Family History   Problem Relation Age of Onset    Heart Disease Mother     Diabetes Mother     Cancer Father         LUNG     Social History     Socioeconomic History    Marital status:       Spouse name: None    Number of children: None    Years of education: None    Highest education level: None   Occupational History    None   Social Needs    Financial resource strain: None    Food insecurity     Worry: None     Inability: None    Transportation needs     Medical: None     Non-medical: None   Tobacco Use    Smoking status: Never Smoker    Smokeless tobacco: Never Used   Substance and Sexual Activity    Alcohol use: No    Drug use: No    Sexual activity: None   Lifestyle    Physical activity     Days per week: None     Minutes per session: None    Stress: None   Relationships    Social connections     Talks on phone: None     Gets together: None     Attends [employer] service: None     Active member of club or organization: None     Attends meetings of clubs or organizations: None     Relationship status: None    Intimate partner violence     Fear of current or ex partner: None     Emotionally abused: None     Physically abused: None     Forced sexual activity: None   Other Topics Concern    None   Social History Narrative    None       Subjective/HPI  remains confused. Appears comfortable. Failed swallow study. No acute events    EKG:SR 82        Review of Systems:   Review of Systems  Not reliable    Physical Examination:    BP (!) 180/79   Pulse 97   Temp 97.5 °F (36.4 °C) (Oral)   Resp 18   Ht 5' (1.524 m)   Wt 97 lb 4.8 oz (44.1 kg)   LMP  (LMP Unknown)   SpO2 100%   BMI 19.00 kg/m²    Physical Exam   Constitutional: She appears healthy. No distress. HENT:   Normal cephalic and Atraumatic   Eyes: Pupils are equal, round, and reactive to light. Neck: Normal range of motion and thyroid normal. Neck supple. No JVD present. No neck adenopathy. No thyromegaly present. Cardiovascular: Normal rate, regular rhythm, intact distal pulses and normal pulses. Murmur heard. Pulmonary/Chest: Effort normal and breath sounds normal. She has no wheezes. She has no rales. She exhibits no tenderness. Abdominal: Soft. Bowel sounds are normal. There is no abdominal tenderness. Musculoskeletal: Normal range of motion. General: No tenderness or edema. Neurological: She exhibits altered mental status. Skin: Skin is warm. No cyanosis. Nails show no clubbing.        LABS:  CBC:   Lab Results   Component Value Date    WBC 9.5 03/11/2021    RBC 3.49 03/11/2021    RBC 4.05 11/07/2011    HGB 10.9 03/11/2021    HCT 32.3 03/11/2021    MCV 92.5 03/11/2021    MCH 31.1 03/11/2021    MCHC 33.7 03/11/2021    RDW 14.9 03/11/2021     03/11/2021    MPV 8.4 09/01/2015     CBC with Differential:    Lab Results   Component Value Date    WBC 9.5 03/11/2021    RBC 3.49 03/11/2021    RBC 4.05 11/07/2011    HGB 10.9 03/11/2021    HCT 32.3 03/11/2021     03/11/2021 MCV 92.5 03/11/2021    MCH 31.1 03/11/2021    MCHC 33.7 03/11/2021    RDW 14.9 03/11/2021    BANDSPCT 5 03/10/2015    LYMPHOPCT 10.8 03/11/2021    MONOPCT 8.5 03/11/2021    EOSPCT 20.5 11/07/2011    BASOPCT 0.6 03/11/2021    MONOSABS 0.8 03/11/2021    LYMPHSABS 1.0 03/11/2021    EOSABS 0.3 03/11/2021    BASOSABS 0.1 03/11/2021     CMP:    Lab Results   Component Value Date     03/11/2021    K 3.7 03/11/2021     03/11/2021    CO2 22 03/11/2021    BUN 29 03/11/2021    CREATININE 0.54 03/11/2021    GFRAA >60.0 03/11/2021    LABGLOM >60.0 03/11/2021    GLUCOSE 146 03/11/2021    GLUCOSE 121 11/07/2011    PROT 7.6 03/11/2021    LABALBU 3.0 03/11/2021    LABALBU 4.2 11/07/2011    CALCIUM 9.5 03/11/2021    BILITOT 1.0 03/11/2021    ALKPHOS 202 03/11/2021    AST 71 03/11/2021    ALT 39 03/11/2021     BMP:    Lab Results   Component Value Date     03/11/2021    K 3.7 03/11/2021     03/11/2021    CO2 22 03/11/2021    BUN 29 03/11/2021    LABALBU 3.0 03/11/2021    LABALBU 4.2 11/07/2011    CREATININE 0.54 03/11/2021    CALCIUM 9.5 03/11/2021    GFRAA >60.0 03/11/2021    LABGLOM >60.0 03/11/2021    GLUCOSE 146 03/11/2021    GLUCOSE 121 11/07/2011     Magnesium:    Lab Results   Component Value Date    MG 2.0 03/09/2021     Troponin:    Lab Results   Component Value Date    TROPONINI <0.010 03/04/2021        Active Hospital Problems    Diagnosis Date Noted    Multiple closed fractures of ribs of right side [S22.41XA]      Priority: Low    New onset [facility]-fib ([facility] [I48.91] 03/04/2021     Priority: Low    Nausea & vomiting [R11.2] 03/04/2021     Priority: Low    Falls [W19. XXXA] 03/04/2021     Priority: Low    General weakness [R53.1] 03/04/2021     Priority: Low    Hyponatremia [E87.1] 03/04/2021     Priority: Low    Pleural effusion, right [J90] 03/04/2021     Priority: Low    Dementia ([facility] [F03.90]      Priority: Low    Type 2 diabetes mellitus with complication ([facility] [M36.1] 12/16/2015 Priority: Low    CAD (coronary artery disease) [I25.10]      Priority: Low        Assessment/Plan:  1. AF- converted to SR.   2. HTN - uncontrolled. Failed swallow and is NPO. Dc PO BB. Add Catapres patch. - can advance dose asa tolerated. 3. Advanced Dementia. 4. Echo LVEF normal   5. Hospice evaluation in progress. - family deciding       Electronically signed by Radha Cuevas MD on 3/11/2021 at 9:41 AM

## 2021-03-11 NOTE — PROGRESS NOTES
INPATIENT PROGRESS NOTES    PATIENT NAME: Jose Luis Arriola  MRN: [id number]  SERVICE DATE:  March 11, 2021   SERVICE TIME:  12:45 PM      PRIMARY SERVICE: Pulmonary Disease    CHIEF COMPLAIN: Right pleural effusion      INTERVAL HPI: Patient seen and examined at bedside, Interval Notes, orders reviewed. Nursing notes noted  Discussed with daughter yesterday. She met with hospice. She would like to discuss with other family members but most likely she may go home with hospice. She is not having short of breath. She is on room air and O2 saturation 100%. OBJECTIVE    Body mass index is 19 kg/m². PHYSICAL EXAM:  Vitals:  BP (!) 180/79   Pulse 97   Temp 97.5 °F (36.4 °C) (Oral)   Resp 18   Ht 5' (1.524 m)   Wt 97 lb 4.8 oz (44.1 kg)   LMP  (LMP Unknown)   SpO2 100%   BMI 19.00 kg/m²   General: Alert, awake . comfortable in bed, No distress. Head: Atraumatic , Normocephalic   Eyes: PERRL. No sclera icterus. No conjunctival injection. No discharge   ENT: No nasal  discharge. Pharynx clear. Neck:  Trachea midline. No thyromegaly, no JVD, No cervical adenopathy. Chest : Bilaterally symmetrical ,Normal effort,  No accessory muscle use  Lung : Diminished breath sound at right base. No Rales. No wheezing. No rhonchi. Heart[de-identified] Normal  rate. Regular rhythm. No mumur ,  Rub or gallop  ABD: Non-tender. Non-distended. No masses. No organmegaly. Normal bowel sounds. No hernia.   Ext : No Pitting both leg , No Cyanosis No clubbing  Neuro: no focal weakness          DATA:   Recent Labs     03/10/21  0616 03/11/21  0549   WBC 9.8 9.5   HGB 10.2* 10.9*   HCT 31.8* 32.3*   MCV 96.4 92.5    412*     Recent Labs     03/10/21  0616 03/11/21  0549    142   K 4.0 3.7    105   CO2 20 22   BUN 25* 29*   CREATININE 0.64 0.54   GLUCOSE 132* 146*   CALCIUM 9.3 9.5   PROT 7.3 7.6   LABALBU 2.8* 3.0*   BILITOT 0.9* 1.0*   ALKPHOS 141* 202*   AST 61* 71*   ALT 26 39*   LABGLOM >60.0 >60.0   GFRAA >60.0 >60.0 GLOB 4.5* 4.6*       MV Settings:          No results for input(s): PHART, EGV7UEV, PO2ART, CGA4TOZ, BEART, L3DTACZE in the last 72 hours. O2 Device: None (Room air)  O2 Flow Rate (L/min): 0 L/min    Diet NPO Effective Now     MEDICATIONS during current hospitalization:    Continuous Infusions:   dextrose         Scheduled Meds:   cloNIDine  1 patch Transdermal Weekly    mirtazapine  7.5 mg Oral Nightly    sodium chloride flush  10 mL Intravenous 2 times per day    insulin lispro  0-12 Units Subcutaneous TID WC    insulin lispro  0-6 Units Subcutaneous Nightly       PRN Meds:metoprolol, haloperidol lactate, sodium chloride flush, promethazine **OR** ondansetron, polyethylene glycol, acetaminophen **OR** acetaminophen, glucose, dextrose, glucagon (rDNA), dextrose    Radiology  Echocardiogram Complete 2d With Doppler With Color    Result Date: 3/5/2021  Transthoracic Echocardiography Report (TTE)  Demographics  Patient Name    Lyla Ramey Gender                Female  Patient Number  [id number]      Race                                                  Ethnicity  Visit Number    [id number]     Room Number           W180  Corporate ID                  Date of Study         03/05/2021  Accession       [id number]    Referring Physician  Number  Date of Birth   02/10/1928    Sonographer           Dmitri Avery MAME  Age             80 year(s)    Interpreting          [facility])                                Physician             Cardiology                                                      [address] [place] [address] Procedure Type of Study  TTE procedure:ECHO COMPLETE 2D W/DOP W/COLOR. Procedure Date Date: 03/05/2021 Start: 09:56 AM Study Location: Portable Technical Quality: Adequate visualization Indications:Atrial fibrillation. Patient Status: Routine Height: 60 inches Weight: 145 pounds BSA: 1.63 m^2 BMI: 28.32 kg/m^2 BP: 153/47 mmHg Allergies   - No known allergies.   Conclusions  Summary  Aortic valve leaflets are mildly thickened. Trivial aortic regurgitation is noted. Normal tricuspid valve structure and function. There is mild ( 1 +) tricuspid regurgitation with estimated RVSP of 31 mm  Hg. Mildly dilated left atrium. Normal left ventricle structure and function. Impaired relaxation compatible with diastolic dysfunction. ( reversed E/A  ratio)  Moderate concentric left ventricular hypertrophy. Signature  ----------------------------------------------------------------  Electronically signed by Mireya Hutchinson(Interpreting physician)  on 03/05/2021 01:28 PM  ----------------------------------------------------------------  Findings Left Ventricle Normal left ventricle structure and function. Impaired relaxation compatible with diastolic dysfunction. ( reversed E/A ratio) Moderate concentric left ventricular hypertrophy. Right Ventricle Normal right ventricle structure and function. Left Atrium Mildly dilated left atrium. Right Atrium Normal right atrium. Mitral Valve Mitral valve leaflets are mildly thickened with preserved leaflet mobility. Tricuspid Valve Normal tricuspid valve structure and function. There is mild ( 1 +) tricuspid regurgitation with estimated RVSP of 31 mm Hg. Aortic Valve Aortic valve leaflets are mildly thickened. Trivial aortic regurgitation is noted. Pulmonic Valve Normal pulmonic valve structure and function. Pericardial Effusion No evidence of pericardial effusion. Pleural Effusion No evidence of pleural effusion. Aorta \ Miscellaneous Miscellaneous normal findings were found. M-Mode Measurements (cm)  LVIDd: 4.19 cm                         LVIDs: 2.64 cm  IVSd: 1.07 cm                          IVSs: 1.29 cm  LVPWd: 1.01 cm                         LVPWs: 1.33 cm  Rt. Vent.  Dimension: 3.26 cm           AO Root Dimension: 3.32 cm                                         ACS: 1.48 cm                                         LA: 3.12 cm Doppler Measurements:  AV Peak Gradient: 12.22 mmHg MV Peak E-Wave: 0.68 m/s  AV Mean Gradient: 7.6 mmHg             MV Peak A-Wave: 1.05 m/s  TR Velocity:2.67 m/s  TR Gradient:28.61 mmHg                                         Estimated RAP:3 mmHg                                         RVSP:31.61 mmHg Valves  Mitral Valve  Peak E-Wave: 0.68 m/s                 Peak A-Wave: 1.05 m/s                                        E/A Ratio: 0.65                                        Peak Gradient: 1.85 mmHg                                        Deceleration Time: 260.1 msec  Tissue Doppler  E' Septal Velocity: 0.06 m/s  E' Lateral Velocity: 0.05 m/s  Aortic Valve  Peak Velocity: 1.75 m/s               Mean Velocity: 1.33 m/s  Peak Gradient: 12.22 mmHg             Mean Gradient: 7.6 mmHg  AV VTI: 32.27 cm  Cusp Separation: 1.48 cm  Tricuspid Valve  Estimated RVSP: 31.61 mmHg              Estimated RAP: 3 mmHg  TR Velocity: 2.67 m/s                   TR Gradient: 28.61 mmHg  Pulmonic Valve  Peak Velocity: 1.12 m/s           Peak Gradient: 5.02 mmHg                                    Estimated PASP: 31.61 mmHg  LVOT  Peak Velocity: 0.94 m/s              Mean Velocity: 0.6 m/s  Peak Gradient: 2.88 mmHg             Mean Gradient: 1.62 mmHg                                       LVOT VTI: 17.21 cm Structures  Left Atrium  LA Dimension: 3.12 cm                        LA Area: 14.08 cm^2  LA/Aorta: 0.94  LA Volume/Index: 37.82 ml /23 m^2  Left Ventricle  Diastolic Dimension: 2.91 cm        Systolic Dimension: 7.35 cm  Septum Diastolic: 7.73 cm           Septum Systolic: 1.56 cm  PW Diastolic: 2.51 cm               PW Systolic: 5.95 cm                                      FS: 37 %  LV EDV/LV EDV Index: 78 ml/48 m^2   LV ESV/LV ESV Index: 25.5 ml/16 m^2  EF Calculated: 67.3 %               LV Length: 5.44 cm  Right Atrium  RA Systolic Pressure: 3 mmHg  Right Ventricle  Diastolic Dimension: 5.25 cm                                    RV Systolic Pressure: 69.45 mmHg Aorta/ Miscellaneous Aorta  Aortic Root: 3.32 cm    Xr Acute Abd Series Chest 1 Vw    Result Date: 3/5/2021  XR ACUTE ABD SERIES CHEST 1 VW : 3/4/2021 CLINICAL HISTORY:  nausea and vomiting . COMPARISON: Portable chest 9/16/2020 and CT abdomen pelvis 2/15/2011. TECHNIQUE: An upright PA radiograph of the chest, and upright and supine radiographs of the abdomen and pelvis were obtained. FINDINGS: Several acute-appearing right mid to lower posterolateral rib fractures are noted, with a moderate-sized right pleural effusion and probable compressive right basilar atelectasis. There is no pneumothorax, significant left pleural effusion, cardiomegaly, or other acute fractures identified. Chronic healed deformity of the proximal right humerus, degenerative changes and mild rotary levoscoliosis of the lumbar spine appear unchanged. There is a normal bowel gas pattern. SEVERAL ACUTE RIGHT MID TO LOWER POSTEROLATERAL RIGHT RIB FRACTURES. MODERATE-SIZED RIGHT PLEURAL EFFUSION AND PROBABLE COMPRESSIVE ATELECTASIS. Ct Head Wo Contrast    Result Date: 3/4/2021  CT Brain. Contrast medium:  without contrast.. History:  Confusion. Technical factors: CT imaging of the brain was obtained and formatted as 5 mm contiguous axial images. 2.5 mm contiguous axial images were obtained through the osseous structures. Sagittal and coronal reconstruction obtained during postprocessing. Comparison:  CT brain, September 16, 2020 MRI/MRA brain,  August 28, 2015. Findings: Study limited secondary to motion artifact. Extra-axial spaces:  Normal. Intracranial hemorrhage:  None. Ventricular system: Ventricles mildly enlarged. Sulci mildly prominent. Basal Cisterns:  Normal. Cerebral Parenchyma: Bilateral symmetric periventricular areas decreased attenuation Midline Shift:  None. Cerebellum:  Normal. Paranasal sinuses and mastoid air cells:  Normal. Visualized Orbits:  Normal.     Impression: Study limited secondary to motion artifact.  No acute findings. Mild cerebral atrophy. Chronic ischemic white matter disease. All CT scans at this facility use dose modulation, iterative reconstruction, and/or weight based dosing when appropriate to reduce radiation dose to as low as reasonably achievable. Xr Chest Decubitus Right    Result Date: 3/6/2021  EXAMINATION:  XR CHEST DECUBITUS LEFT, XR CHEST DECUBITUS RIGHT CLINICAL HISTORY:  pleural effusion . Diegole Aryaing COMPARISONS:  NONE AVAILABLE TECHNIQUE:  Bilateral decubitus films of the chest. 2 views are submitted. FINDINGS:  There is a right pleural effusion probably loculated as it does not appear to be free flowing. No left-sided pleural effusion. The field-of-view there is fractures of the lateral aspect of the right seventh eighth ninth and 10th ribs. RIGHT-SIDED PLEURAL EFFUSION WHICH IS PROBABLY LOCULATED AS IT IS NOT FREELY FLOWING. Xr Chest Decubitus Left    Result Date: 3/6/2021  EXAMINATION:  XR CHEST DECUBITUS LEFT, XR CHEST DECUBITUS RIGHT CLINICAL HISTORY:  pleural effusion . Keagan Patrick COMPARISONS:  NONE AVAILABLE TECHNIQUE:  Bilateral decubitus films of the chest. 2 views are submitted. FINDINGS:  There is a right pleural effusion probably loculated as it does not appear to be free flowing. No left-sided pleural effusion. The field-of-view there is fractures of the lateral aspect of the right seventh eighth ninth and 10th ribs. RIGHT-SIDED PLEURAL EFFUSION WHICH IS PROBABLY LOCULATED AS IT IS NOT FREELY FLOWING. IMPRESSION AND SUGGESTION:  1. Right pleural effusion, loculated. 2. History of fall with multiple right rib fracture. 3. New onset A. Fib  4. Coronary  artery disease status post stent  5. Dementia    Discussed with daughter yesterday she is not candidate for any surgical intervention for loculated right pleural effusion. She is on room air O2 saturation 98% she is considering but she need to discuss with other family members regarding home hospice when she goes home. .  No new change    NOTE: This report was transcribed using voice recognition software. Every effort was made to ensure accuracy; however, inadvertent computerized transcription errors may be present.       Electronically signed by Merlene Rodriguez MD, FCCP on 3/11/2021 at 12:45 PM

## 2021-03-11 NOTE — PROGRESS NOTES
Physical Therapy Missed Treatment   Facility/Department: [facility] [id number]/[id number]    NAME: Stevie Lyle    : 2/10/1928 (80 y.o.)  MRN: [id number]    Account: [de-identified]  Gender: female      Pts chart reviewed. Discussed case with pts nurse also. Following this discussion, we will hold PT eval at this time. Hospice is still being considered and pt has multiple rib fractures making movements painful. Nursing staff agrees. Will follow and attempt PT evaluation again at earliest availability.        Janae Caro, PT, 21 at 2:53 PM

## 2021-03-11 NOTE — PROGRESS NOTES
Hospitalist Daily Progress Note  Name: Cj Meraz  Age: 80 y.o. Gender: female  CodeStatus: DNR-CCA  Allergies: No Known Allergies    Chief Complaint:Emesis (nauseated was given 4 mg of zofran and 500 ml of NS )    Primary Care Provider: Abdoul Longoria MD  InpatientTreatment Team: Treatment Team: Attending Provider: Jose Armando Joshua MD; Consulting Physician: James Mcintyre MD; Consulting Physician: Michael Carbone MD; Utilization Reviewer: Brittney Rushing RN; Consulting Physician: Jarrod Barnett MD; Consulting Physician: Kali Dumont MD; Utilization Reviewer: Magaly Morataya, RN; : IRWIN Flores; Registered Nurse: Joni Brittle, RN; Patient Care Tech: [facility]  Admission Date: 3/4/2021      Subjective: Patient sleepy, rousable. No cp, sob. Physical Exam  Constitutional:       Comments: Thin and cachectic. Agitated, confused   HENT:      Head: Normocephalic and atraumatic. Mouth/Throat:      Mouth: Mucous membranes are moist.      Pharynx: Oropharynx is clear. Eyes:      Conjunctiva/sclera: Conjunctivae normal.      Pupils: Pupils are equal, round, and reactive to light. Cardiovascular:      Rate and Rhythm: Tachycardia present. Rhythm irregular. Heart sounds: Murmur present. No friction rub. No gallop. Comments: BEST 2 out of 6  Pulmonary:      Breath sounds: Rales present. No wheezing or rhonchi. Abdominal:      General: There is no distension. Tenderness: There is no abdominal tenderness. There is no guarding. Musculoskeletal: Normal range of motion. Right lower leg: No edema. Left lower leg: No edema. Neurological:      Mental Status: She is alert. Comments: Oriented only to self. Psychiatric:      Comments: Odd mood and affect, very hard of hearing, but poor insight. Review of Systems  Reliable ROS not able to be obtained due to confusion.    Medications:  Reviewed    Infusion Medications:    dextrose       Scheduled Medications:    cloNIDine  1 patch Transdermal Weekly    mirtazapine  7.5 mg Oral Nightly    sodium chloride flush  10 mL Intravenous 2 times per day    insulin lispro  0-12 Units Subcutaneous TID WC    insulin lispro  0-6 Units Subcutaneous Nightly     PRN Meds: metoprolol, haloperidol lactate, sodium chloride flush, promethazine **OR** ondansetron, polyethylene glycol, acetaminophen **OR** acetaminophen, glucose, dextrose, glucagon (rDNA), dextrose    Labs:   Recent Labs     03/08/21  0551 03/09/21  0549 03/10/21  0616   WBC 9.3 9.4 9.8   HGB 11.2* 10.9* 10.2*   HCT 34.1* 34.1* 31.8*    397 366     Recent Labs     03/08/21  0551 03/09/21  0549 03/10/21  0616    140 141   K 3.6 3.5 4.0    102 103   CO2 22 20 20   BUN 19 21 25*   CREATININE 0.60 0.64 0.64   CALCIUM 8.6 8.8 9.3     Recent Labs     03/08/21  0551 03/09/21  0549 03/10/21  0616   AST 16 27 61*   ALT 8 13 26   BILITOT 0.7 0.8* 0.9*   ALKPHOS 108 122 141*     No results for input(s): INR in the last 72 hours. No results for input(s): Katshahabanne Fennel in the last 72 hours. Urinalysis:   Lab Results   Component Value Date    NITRU Negative 03/04/2021    WBCUA 0-2 03/04/2021    BACTERIA Negative 03/04/2021    RBCUA 0-2 03/04/2021    BLOODU Negative 03/04/2021    SPECGRAV 1.015 03/04/2021    GLUCOSEU Negative 03/04/2021       Radiology:   Most recent    Chest CT      WITH CONTRAST:No results found for this or any previous visit. WITHOUT CONTRAST: No results found for this or any previous visit. CXR      2-view:   Results for orders placed during the hospital encounter of 09/29/18   XR CHEST STANDARD (2 VW)    Narrative EXAMINATION: XR CHEST (2 VW)    CLINICAL HISTORY: SYNCOPE    COMPARISONS: AUGUST 20 17,015    FINDINGS: Osteopenia. Remote fracture healed right humeral neck. Patient leaning to right. Cardiopericardial silhouette normal. Aorta tortuous and calcified. Lungs clear.       Impression NO ACUTE CARDIOPULMONARY DISEASE        Portable:   Results for orders placed during the hospital encounter of 09/16/20   XR CHEST PORTABLE    Narrative EXAMINATION: CHEST PORTABLE VIEW     CLINICAL HISTORY: Midsternal chest pain    COMPARISONS: May 3, 2019     FINDINGS:    Single  views of the chest is submitted. The cardiac silhouette is enlarged. Pulmonary vascular attenuated  Right sided trachea. No focal infiltrates. No Pneumothoraces. Probable old healed fracture right humeral head with associated severe degenerative joint disease                                                                                    Impression NO ACUTE ACTIVE CARDIOPULMONARY PROCESS       Echo No results found for this or any previous visit. Assessment/Plan:    Active Hospital Problems    Diagnosis Date Noted    Multiple closed fractures of ribs of right side [S22.41XA]     New onset a-fib ([facility] [address] [I48.91] 03/04/2021    Nausea & vomiting [R11.2] 03/04/2021    Falls [W19. XXXA] 03/04/2021    General weakness [R53.1] 03/04/2021    Hyponatremia [E87.1] 03/04/2021    Pleural effusion, right [J90] 03/04/2021    Dementia ([facility] [address] [F03.90]     Type 2 diabetes mellitus with complication ([facility] [address] [I88.5] 12/16/2015    CAD (coronary artery disease) [I25.10]      Atrial Fibrillation with RVR: hold anticoagulation for possible procedure inpt. Poor candidate for chronic anticoagulation given frequent falls. Continue beta blocker. Cardiology following. Right pleural effusion: Consult to IR for thoracentesis. Consult pulmonary for recommendations regarding attempting any procedures given her advanced age and poor functional status. . Lateral cxr imaging shows loculated effusion    Dementia with confusion: daughter notes dramatic worsening compared to baseline. Ct head negative. No infectious cause.  Consult to neuro per family request.  Given the patient's relatively advanced dementia with poor functional status the patient does have a very poor

## 2021-03-11 NOTE — PLAN OF CARE
Nutrition Problem #1: Severe malnutrition, In context of chronic illness  Intervention: Food and/or Nutrient Delivery: Continue NPO  Nutritional Goals: pending decision regarding hospice care

## 2021-03-11 NOTE — FLOWSHEET NOTE
Pt sat up in bed and given a 120 mL of water without any coughing or signs of choking. Pt given PO remeron and she swallowed without any issues. 2309 - Pt medicated with PRN lopressor for a BP of 171/79.     0545 - Pt bathed and mepalexes applied to bilateral heels and sacrum. Jailyn care performed and mouth swabbed. Pt incontinent of urine. Pt's bed alarm active.

## 2021-03-11 NOTE — PROGRESS NOTES
Weight: 100 lbs;   · BMI: 18.9  · BMI Categories: Underweight (BMI less than 22) age over 72       Nutrition Diagnosis:   · Severe malnutrition, In context of chronic illness related to catabolic illness, inadequate protein-energy intake as evidenced by BMI, poor intake prior to admission, moderate loss of subcutaneous fat, moderate muscle loss      Nutrition Interventions:   Food and/or Nutrient Delivery:  Continue NPO  Nutrition Education/Counseling:  No recommendation at this time   Coordination of Nutrition Care:  Continue to monitor while inpatient    Goals:  pending decision regarding hospice care       Nutrition Monitoring and Evaluation:        Food/Nutrient Intake Outcomes:  Food and Nutrient Intake  Physical Signs/Symptoms Outcomes:  Chewing or Swallowing, Weight     Discharge Planning:     Too soon to determine     Electronically signed by Marialuisa Han RD, LD on 3/11/21 at 1:44 PM EST

## 2021-03-11 NOTE — CARE COORDINATION
[facility] met with the patient's daughter yesterday at the hospital. Per [facility], the patient's daughter, Stacy Zheng, was talking with family members regarding possible hospice services at discharge. Electronically signed by Naida Gonzalez on 3/11/21 at 10:46 AM EST    The LSW called and talked to the patient's daughter, Stacy Zheng. Stacy Zheng states she was waiting for a phone call from Dr. Vanda Angeles. The LSW discussed that Dr. Adriane Magaña is the hospitalist today. The patient's daughter states she has talked to Dr. Adriane Magaña and would like to talk with him first then hospice. The LSW updated the , Jerrica Domingo at [facility], [facility] and the patient's RN.  Electronically signed by Naida Gonzalez on 3/11/21 at 12:10 PM EST

## 2021-03-12 LAB
ALBUMIN SERPL-MCNC: 3 G/DL (ref 3.5–4.6)
ALP BLD-CCNC: 159 U/L (ref 40–130)
ALT SERPL-CCNC: 45 U/L (ref 0–33)
ANION GAP SERPL CALCULATED.3IONS-SCNC: 12 MEQ/L (ref 9–15)
AST SERPL-CCNC: 65 U/L (ref 0–35)
BASOPHILS ABSOLUTE: 0.1 K/UL (ref 0–0.2)
BASOPHILS RELATIVE PERCENT: 0.7 %
BILIRUB SERPL-MCNC: 0.5 MG/DL (ref 0.2–0.7)
BUN BLDV-MCNC: 36 MG/DL (ref 8–23)
CALCIUM SERPL-MCNC: 9.3 MG/DL (ref 8.5–9.9)
CHLORIDE BLD-SCNC: 105 MEQ/L (ref 95–107)
CO2: 28 MEQ/L (ref 20–31)
CREAT SERPL-MCNC: 0.64 MG/DL (ref 0.5–0.9)
EOSINOPHILS ABSOLUTE: 0.8 K/UL (ref 0–0.7)
EOSINOPHILS RELATIVE PERCENT: 10.9 %
GFR AFRICAN AMERICAN: >60
GFR NON-AFRICAN AMERICAN: >60
GLOBULIN: 4.3 G/DL (ref 2.3–3.5)
GLUCOSE BLD-MCNC: 124 MG/DL (ref 60–115)
GLUCOSE BLD-MCNC: 127 MG/DL (ref 60–115)
GLUCOSE BLD-MCNC: 141 MG/DL (ref 60–115)
GLUCOSE BLD-MCNC: 141 MG/DL (ref 70–99)
GLUCOSE BLD-MCNC: 198 MG/DL (ref 60–115)
HCT VFR BLD CALC: 34.2 % (ref 37–47)
HEMOGLOBIN: 11.2 G/DL (ref 12–16)
LYMPHOCYTES ABSOLUTE: 0.8 K/UL (ref 1–4.8)
LYMPHOCYTES RELATIVE PERCENT: 10.7 %
MCH RBC QN AUTO: 29.9 PG (ref 27–31.3)
MCHC RBC AUTO-ENTMCNC: 32.7 % (ref 33–37)
MCV RBC AUTO: 91.4 FL (ref 82–100)
MONOCYTES ABSOLUTE: 0.5 K/UL (ref 0.2–0.8)
MONOCYTES RELATIVE PERCENT: 7.1 %
NEUTROPHILS ABSOLUTE: 5.2 K/UL (ref 1.4–6.5)
NEUTROPHILS RELATIVE PERCENT: 70.6 %
PDW BLD-RTO: 14.8 % (ref 11.5–14.5)
PERFORMED ON: ABNORMAL
PLATELET # BLD: 408 K/UL (ref 130–400)
POTASSIUM REFLEX MAGNESIUM: 3.7 MEQ/L (ref 3.4–4.9)
RBC # BLD: 3.74 M/UL (ref 4.2–5.4)
SODIUM BLD-SCNC: 145 MEQ/L (ref 135–144)
TOTAL PROTEIN: 7.3 G/DL (ref 6.3–8)
WBC # BLD: 7.3 K/UL (ref 4.8–10.8)

## 2021-03-12 PROCEDURE — APPSS15 APP SPLIT SHARED TIME 0-15 MINUTES: Performed by: NURSE PRACTITIONER

## 2021-03-12 PROCEDURE — 99233 SBSQ HOSP IP/OBS HIGH 50: CPT | Performed by: PSYCHIATRY & NEUROLOGY

## 2021-03-12 PROCEDURE — 99232 SBSQ HOSP IP/OBS MODERATE 35: CPT | Performed by: INTERNAL MEDICINE

## 2021-03-12 PROCEDURE — 36415 COLL VENOUS BLD VENIPUNCTURE: CPT

## 2021-03-12 PROCEDURE — 85025 COMPLETE CBC W/AUTO DIFF WBC: CPT

## 2021-03-12 PROCEDURE — 6370000000 HC RX 637 (ALT 250 FOR IP): Performed by: INTERNAL MEDICINE

## 2021-03-12 PROCEDURE — 2580000003 HC RX 258: Performed by: NURSE PRACTITIONER

## 2021-03-12 PROCEDURE — 2060000000 HC ICU INTERMEDIATE R&B

## 2021-03-12 PROCEDURE — 80053 COMPREHEN METABOLIC PANEL: CPT

## 2021-03-12 RX ADMIN — MIRTAZAPINE 7.5 MG: 15 TABLET, FILM COATED ORAL at 21:00

## 2021-03-12 RX ADMIN — Medication 10 ML: at 21:00

## 2021-03-12 RX ADMIN — Medication 10 ML: at 07:38

## 2021-03-12 ASSESSMENT — ENCOUNTER SYMPTOMS
TROUBLE SWALLOWING: 0
COLOR CHANGE: 0
COUGH: 0
WHEEZING: 0

## 2021-03-12 ASSESSMENT — PAIN DESCRIPTION - LOCATION: LOCATION: RIB CAGE

## 2021-03-12 ASSESSMENT — PAIN DESCRIPTION - ORIENTATION: ORIENTATION: RIGHT

## 2021-03-12 ASSESSMENT — PAIN DESCRIPTION - PAIN TYPE: TYPE: CHRONIC PAIN

## 2021-03-12 ASSESSMENT — PAIN SCALES - WONG BAKER
WONGBAKER_NUMERICALRESPONSE: 0

## 2021-03-12 ASSESSMENT — PAIN SCALES - GENERAL: PAINLEVEL_OUTOF10: 0

## 2021-03-12 NOTE — CARE COORDINATION
PER DR MA HE SPOKE WITH ARGELIA AND DISCUSSED DC PLAN OF HOME WITH HOSPICE. PER ARGELIA SHE WAS EXPECTING DC TO BE MONDAY OR Tuesday. MD EXPLAINED TO HER THE PLAN WOULD BE TODAY OR TOMORROW ONCE EQUIPMENT IS DELIVERED. PER DR Hoffman 91 WILL CALL [facility] TO START GETTING THE HOME READY.

## 2021-03-12 NOTE — PROGRESS NOTES
INPATIENT PROGRESS NOTES    PATIENT NAME: Rossana Wolfe  MRN: [id number]  SERVICE DATE:  March 12, 2021   SERVICE TIME:  3:28 PM      PRIMARY SERVICE: Pulmonary Disease    CHIEF COMPLAIN: Right pleural effusion      INTERVAL HPI: Patient seen and examined at bedside, Interval Notes, orders reviewed. Nursing notes noted  Discussed with RN. Patient's son need to make a decision. Daughter met with hospice. She is confused. She is not having short of breath. She is on room air and O2 saturation 100%. OBJECTIVE    Body mass index is 18.36 kg/m². PHYSICAL EXAM:  Vitals:  BP (!) 161/67   Pulse 81   Temp 98.3 °F (36.8 °C) (Oral)   Resp 18   Ht 5' (1.524 m)   Wt 94 lb (42.6 kg)   LMP  (LMP Unknown)   SpO2 100%   BMI 18.36 kg/m²   General: Alert, awake . comfortable in bed, No distress. Head: Atraumatic , Normocephalic   Eyes: PERRL. No sclera icterus. No conjunctival injection. No discharge   ENT: No nasal  discharge. Pharynx clear. Neck:  Trachea midline. No thyromegaly, no JVD, No cervical adenopathy. Chest : Bilaterally symmetrical ,Normal effort,  No accessory muscle use  Lung : Diminished breath sound at right base. No Rales. No wheezing. No rhonchi. Heart[de-identified] Normal  rate. Regular rhythm. No mumur ,  Rub or gallop  ABD: Non-tender. Non-distended. No masses. No organmegaly. Normal bowel sounds. No hernia.   Ext : No Pitting both leg , No Cyanosis No clubbing  Neuro: no focal weakness          DATA:   Recent Labs     03/11/21  0549 03/12/21  0526   WBC 9.5 7.3   HGB 10.9* 11.2*   HCT 32.3* 34.2*   MCV 92.5 91.4   * 408*     Recent Labs     03/11/21  0549 03/12/21  0525    145*   K 3.7 3.7    105   CO2 22 28   BUN 29* 36*   CREATININE 0.54 0.64   GLUCOSE 146* 141*   CALCIUM 9.5 9.3   PROT 7.6 7.3   LABALBU 3.0* 3.0*   BILITOT 1.0* 0.5   ALKPHOS 202* 159*   AST 71* 65*   ALT 39* 45*   LABGLOM >60.0 >60.0   GFRAA >60.0 >60.0   GLOB 4.6* 4.3*       MV Settings:          No results for input(s): PHART, RDN5GCJ, PO2ART, IOB5YYP, BEART, I9FUCMIV in the last 72 hours. O2 Device: None (Room air)  O2 Flow Rate (L/min): 0 L/min    Diet NPO Effective Now     MEDICATIONS during current hospitalization:    Continuous Infusions:   dextrose         Scheduled Meds:   cloNIDine  1 patch Transdermal Weekly    mirtazapine  7.5 mg Oral Nightly    sodium chloride flush  10 mL Intravenous 2 times per day    insulin lispro  0-12 Units Subcutaneous TID WC    insulin lispro  0-6 Units Subcutaneous Nightly       PRN Meds:metoprolol, haloperidol lactate, sodium chloride flush, promethazine **OR** ondansetron, polyethylene glycol, acetaminophen **OR** acetaminophen, glucose, dextrose, glucagon (rDNA), dextrose    Radiology  Echocardiogram Complete 2d With Doppler With Color    Result Date: 3/5/2021  Transthoracic Echocardiography Report (TTE)  Demographics  Patient Name    Castillo Blunt Gender                Female  Patient Number  [id number]      Race                                                  Ethnicity  Visit Number    [id number]     Room Number           W180  Corporate ID                  Date of Study         03/05/2021  Accession       [id number]    Referring Physician  Number  Date of Birth   02/10/1928    Sonographer           Denver Brandt [facility]  Age             80 year(s)    Interpreting          [facility])                                Physician             Cardiology                                                      [address] [place] [address] Procedure Type of Study  TTE procedure:ECHO COMPLETE 2D W/DOP W/COLOR. Procedure Date Date: 03/05/2021 Start: 09:56 AM Study Location: Portable Technical Quality: Adequate visualization Indications:Atrial fibrillation. Patient Status: Routine Height: 60 inches Weight: 145 pounds BSA: 1.63 m^2 BMI: 28.32 kg/m^2 BP: 153/47 mmHg Allergies   - No known allergies. Conclusions  Summary  Aortic valve leaflets are mildly thickened.   Trivial aortic regurgitation is noted.  Normal tricuspid valve structure and function. There is mild ( 1 +) tricuspid regurgitation with estimated RVSP of 31 mm  Hg. Mildly dilated left atrium. Normal left ventricle structure and function. Impaired relaxation compatible with diastolic dysfunction. ( reversed E/A  ratio)  Moderate concentric left ventricular hypertrophy. Signature  ----------------------------------------------------------------  Electronically signed by Gisela Hutchinson(Interpreting physician)  on 03/05/2021 01:28 PM  ----------------------------------------------------------------  Findings Left Ventricle Normal left ventricle structure and function. Impaired relaxation compatible with diastolic dysfunction. ( reversed E/A ratio) Moderate concentric left ventricular hypertrophy. Right Ventricle Normal right ventricle structure and function. Left Atrium Mildly dilated left atrium. Right Atrium Normal right atrium. Mitral Valve Mitral valve leaflets are mildly thickened with preserved leaflet mobility. Tricuspid Valve Normal tricuspid valve structure and function. There is mild ( 1 +) tricuspid regurgitation with estimated RVSP of 31 mm Hg. Aortic Valve Aortic valve leaflets are mildly thickened. Trivial aortic regurgitation is noted. Pulmonic Valve Normal pulmonic valve structure and function. Pericardial Effusion No evidence of pericardial effusion. Pleural Effusion No evidence of pleural effusion. Aorta \ Miscellaneous Miscellaneous normal findings were found. M-Mode Measurements (cm)  LVIDd: 4.19 cm                         LVIDs: 2.64 cm  IVSd: 1.07 cm                          IVSs: 1.29 cm  LVPWd: 1.01 cm                         LVPWs: 1.33 cm  Rt. Vent.  Dimension: 3.26 cm           AO Root Dimension: 3.32 cm                                         ACS: 1.48 cm                                         LA: 3.12 cm Doppler Measurements:  AV Peak Gradient: 12.22 mmHg           MV Peak E-Wave: 0.68 m/s  AV Mean Gradient: 7.6 mmHg MV Peak A-Wave: 1.05 m/s  TR Velocity:2.67 m/s  TR Gradient:28.61 mmHg                                         Estimated RAP:3 mmHg                                         RVSP:31.61 mmHg Valves  Mitral Valve  Peak E-Wave: 0.68 m/s                 Peak A-Wave: 1.05 m/s                                        E/A Ratio: 0.65                                        Peak Gradient: 1.85 mmHg                                        Deceleration Time: 260.1 msec  Tissue Doppler  E' Septal Velocity: 0.06 m/s  E' Lateral Velocity: 0.05 m/s  Aortic Valve  Peak Velocity: 1.75 m/s               Mean Velocity: 1.33 m/s  Peak Gradient: 12.22 mmHg             Mean Gradient: 7.6 mmHg  AV VTI: 32.27 cm  Cusp Separation: 1.48 cm  Tricuspid Valve  Estimated RVSP: 31.61 mmHg              Estimated RAP: 3 mmHg  TR Velocity: 2.67 m/s                   TR Gradient: 28.61 mmHg  Pulmonic Valve  Peak Velocity: 1.12 m/s           Peak Gradient: 5.02 mmHg                                    Estimated PASP: 31.61 mmHg  LVOT  Peak Velocity: 0.94 m/s              Mean Velocity: 0.6 m/s  Peak Gradient: 2.88 mmHg             Mean Gradient: 1.62 mmHg                                       LVOT VTI: 17.21 cm Structures  Left Atrium  LA Dimension: 3.12 cm                        LA Area: 14.08 cm^2  LA/Aorta: 0.94  LA Volume/Index: 37.82 ml /23 m^2  Left Ventricle  Diastolic Dimension: 4.94 cm        Systolic Dimension: 8.44 cm  Septum Diastolic: 2.58 cm           Septum Systolic: 1.79 cm  PW Diastolic: 6.40 cm               PW Systolic: 0.21 cm                                      FS: 37 %  LV EDV/LV EDV Index: 78 ml/48 m^2   LV ESV/LV ESV Index: 25.5 ml/16 m^2  EF Calculated: 67.3 %               LV Length: 5.44 cm  Right Atrium  RA Systolic Pressure: 3 mmHg  Right Ventricle  Diastolic Dimension: 3.65 cm                                    RV Systolic Pressure: 22.99 mmHg Aorta/ Miscellaneous Aorta  Aortic Root: 3.32 cm    Xr Acute Abd Series Chest 1 Vw    Result Date: 3/5/2021  XR ACUTE ABD SERIES CHEST 1 VW : 3/4/2021 CLINICAL HISTORY:  nausea and vomiting . COMPARISON: Portable chest 9/16/2020 and CT abdomen pelvis 2/15/2011. TECHNIQUE: An upright PA radiograph of the chest, and upright and supine radiographs of the abdomen and pelvis were obtained. FINDINGS: Several acute-appearing right mid to lower posterolateral rib fractures are noted, with a moderate-sized right pleural effusion and probable compressive right basilar atelectasis. There is no pneumothorax, significant left pleural effusion, cardiomegaly, or other acute fractures identified. Chronic healed deformity of the proximal right humerus, degenerative changes and mild rotary levoscoliosis of the lumbar spine appear unchanged. There is a normal bowel gas pattern. SEVERAL ACUTE RIGHT MID TO LOWER POSTEROLATERAL RIGHT RIB FRACTURES. MODERATE-SIZED RIGHT PLEURAL EFFUSION AND PROBABLE COMPRESSIVE ATELECTASIS. Ct Head Wo Contrast    Result Date: 3/4/2021  CT Brain. Contrast medium:  without contrast.. History:  Confusion. Technical factors: CT imaging of the brain was obtained and formatted as 5 mm contiguous axial images. 2.5 mm contiguous axial images were obtained through the osseous structures. Sagittal and coronal reconstruction obtained during postprocessing. Comparison:  CT brain, September 16, 2020 MRI/MRA brain,  August 28, 2015. Findings: Study limited secondary to motion artifact. Extra-axial spaces:  Normal. Intracranial hemorrhage:  None. Ventricular system: Ventricles mildly enlarged. Sulci mildly prominent. Basal Cisterns:  Normal. Cerebral Parenchyma: Bilateral symmetric periventricular areas decreased attenuation Midline Shift:  None. Cerebellum:  Normal. Paranasal sinuses and mastoid air cells:  Normal. Visualized Orbits:  Normal.     Impression: Study limited secondary to motion artifact. No acute findings. Mild cerebral atrophy. Chronic ischemic white matter disease. present.       Electronically signed by Page Canchola MD, FCCP on 3/12/2021 at 3:28 PM

## 2021-03-12 NOTE — PROGRESS NOTES
Hospitalist Daily Progress Note  Name: Alpa Teixeira  Age: 80 y.o. Gender: female  CodeStatus: DNR-CCA  Allergies: No Known Allergies    Chief Complaint:Emesis (nauseated was given 4 mg of zofran and 500 ml of NS )    Primary Care Provider: Martha العراقي MD  InpatientTreatment Team: Treatment Team: Attending Provider: Rossy Lopez MD; Consulting Physician: Shelly Crum MD; Consulting Physician: Klarissa Zamudio MD; Utilization Reviewer: Raul Otto, RN; Consulting Physician: Jerrod Guerrero MD; Consulting Physician: Renan Ellis MD; Utilization Reviewer: Alexandrea Cary, RN; Utilization Reviewer: Britni Louis, RN; Registered Nurse: Estrella Dickey RN; Patient Care Tech: Jenni Montoya; : IRWIN Lee; Patient Care Tech: Tano Salvador; : Raz Ann RN  Admission Date: 3/4/2021      Subjective: Patient sleepy, rousable. No cp, sob. Very fatigued. Physical Exam  Constitutional:       Comments: Thin and cachectic. Agitated, confused   HENT:      Head: Normocephalic and atraumatic. Mouth/Throat:      Mouth: Mucous membranes are moist.      Pharynx: Oropharynx is clear. Eyes:      Conjunctiva/sclera: Conjunctivae normal.      Pupils: Pupils are equal, round, and reactive to light. Cardiovascular:      Rate and Rhythm: Tachycardia present. Rhythm irregular. Heart sounds: Murmur present. No friction rub. No gallop. Comments: BEST 2 out of 6  Pulmonary:      Breath sounds: Rales present. No wheezing or rhonchi. Abdominal:      General: There is no distension. Tenderness: There is no abdominal tenderness. There is no guarding. Musculoskeletal: Normal range of motion. Right lower leg: No edema. Left lower leg: No edema. Neurological:      Mental Status: She is alert. Comments: Oriented only to self. Psychiatric:      Comments: Odd mood and affect, very hard of hearing, but poor insight.           Review of Systems  Reliable ROS not able to be obtained due to confusion. Medications:  Reviewed    Infusion Medications:    dextrose       Scheduled Medications:    cloNIDine  1 patch Transdermal Weekly    mirtazapine  7.5 mg Oral Nightly    sodium chloride flush  10 mL Intravenous 2 times per day    insulin lispro  0-12 Units Subcutaneous TID WC    insulin lispro  0-6 Units Subcutaneous Nightly     PRN Meds: metoprolol, haloperidol lactate, sodium chloride flush, promethazine **OR** ondansetron, polyethylene glycol, acetaminophen **OR** acetaminophen, glucose, dextrose, glucagon (rDNA), dextrose    Labs:   Recent Labs     03/10/21  0616 03/11/21  0549 03/12/21  0526   WBC 9.8 9.5 7.3   HGB 10.2* 10.9* 11.2*   HCT 31.8* 32.3* 34.2*    412* 408*     Recent Labs     03/10/21  0616 03/11/21  0549 03/12/21  0525    142 145*   K 4.0 3.7 3.7    105 105   CO2 20 22 28   BUN 25* 29* 36*   CREATININE 0.64 0.54 0.64   CALCIUM 9.3 9.5 9.3     Recent Labs     03/10/21  0616 03/11/21  0549 03/12/21  0525   AST 61* 71* 65*   ALT 26 39* 45*   BILITOT 0.9* 1.0* 0.5   ALKPHOS 141* 202* 159*     No results for input(s): INR in the last 72 hours. No results for input(s): Catrachitarjbahman Castaneda in the last 72 hours. Urinalysis:   Lab Results   Component Value Date    NITRU Negative 03/04/2021    WBCUA 0-2 03/04/2021    BACTERIA Negative 03/04/2021    RBCUA 0-2 03/04/2021    BLOODU Negative 03/04/2021    SPECGRAV 1.015 03/04/2021    GLUCOSEU Negative 03/04/2021       Radiology:   Most recent    Chest CT      WITH CONTRAST:No results found for this or any previous visit. WITHOUT CONTRAST: No results found for this or any previous visit. CXR      2-view:   Results for orders placed during the hospital encounter of 09/29/18   XR CHEST STANDARD (2 VW)    Narrative EXAMINATION: XR CHEST (2 VW)    CLINICAL HISTORY: SYNCOPE    COMPARISONS: AUGUST 20 17,015    FINDINGS: Osteopenia. Remote fracture healed right humeral neck.  Patient leaning to right. Cardiopericardial silhouette normal. Aorta tortuous and calcified. Lungs clear. Impression NO ACUTE CARDIOPULMONARY DISEASE        Portable:   Results for orders placed during the hospital encounter of 09/16/20   XR CHEST PORTABLE    Narrative EXAMINATION: CHEST PORTABLE VIEW     CLINICAL HISTORY: Midsternal chest pain    COMPARISONS: May 3, 2019     FINDINGS:    Single  views of the chest is submitted. The cardiac silhouette is enlarged. Pulmonary vascular attenuated  Right sided trachea. No focal infiltrates. No Pneumothoraces. Probable old healed fracture right humeral head with associated severe degenerative joint disease                                                                                    Impression NO ACUTE ACTIVE CARDIOPULMONARY PROCESS       Echo No results found for this or any previous visit. Assessment/Plan:    Active Hospital Problems    Diagnosis Date Noted    Severe malnutrition ([facility] [address] [E43] 03/11/2021    Multiple closed fractures of ribs of right side [S22.41XA]     New onset a-fib ([facility] [address] [I48.91] 03/04/2021    Nausea & vomiting [R11.2] 03/04/2021    Falls [W19. XXXA] 03/04/2021    Weakness [R53.1] 03/04/2021    Hyponatremia [E87.1] 03/04/2021    Pleural effusion, right [J90] 03/04/2021    Dementia ([facility] [address] [F03.90]     Type 2 diabetes mellitus with complication ([facility] [address] [I97.0] 12/16/2015    CAD (coronary artery disease) [I25.10]      Atrial Fibrillation with RVR: hold anticoagulation for possible procedure inpt. Poor candidate for chronic anticoagulation given frequent falls. Continue beta blocker. Cardiology following. Right pleural effusion: Consult to IR for thoracentesis. Consult pulmonary for recommendations regarding attempting any procedures given her advanced age and poor functional status. . Lateral cxr imaging shows loculated effusion    Dementia with confusion: daughter notes dramatic worsening compared to baseline. Ct head negative. No infectious cause. Consult to neuro per family request.  Given the patient's relatively advanced dementia with poor functional status the patient does have a very poor prognosis. This is left with family to discuss further plans of care   3/8 - d/w daughter by phone. Arranging hospice meeting today/tomorrow when able. 3/9 - hospice scheduled family meeting morning 3/10   3/10 - family unable to make a final decision today, will hold dc until 3/11 for decision making   3/11 - family unable to make a final decision. D/w daughter again, talking over with Jessica Torres who may take patient home    Falls with multiple rib fracture: PT/OT evaluation. se Remeron before bed, haldol 1mg IM PRN for agitation. DVT PPX held for possible thoracentesis. Additional work up or/and treatment plan may be added today or then after based on clinical progression. I am managing a portion of pt care. Some medical issues are handled byother specialists. Additional work up and treatment should be done in out pt setting by pt PCP and other out pt providers. In addition to examining and evaluating pt, I spent additional time explaining care, normaland abnormal findings, and treatment plan. All of pt questions were answered. Counseling, diet and education were provided. Case will be discussed with nursing staff when appropriate. Family will be updated if and whenappropriate.       Electronically signed by Osmar Li MD on 3/12/2021 at 3:29 PM

## 2021-03-12 NOTE — PROGRESS NOTES
[id number] [facility] Neurology Daily Progress Note  Name: Janie Reagan  Age: 80 y.o. Gender: female  CodeStatus: DNR-CCA  Allergies: No Known Allergies    Chief Complaint:Emesis (nauseated was given 4 mg of zofran and 500 ml of NS )    Primary Care Provider: Marisol Trevino MD  InpatientTreatment Team: Treatment Team: Attending Provider: Merline Grace, MD; Consulting Physician: Yoli Armendariz MD; Consulting Physician: Dereje Romero MD; Utilization Reviewer: Frank Nagy, RN; Consulting Physician: Yanni Hernandez MD; Consulting Physician: Darrius Farah MD; Utilization Reviewer: Ronald Smiley, RN; Utilization Reviewer: Denise Hammond, RN; Registered Nurse: Veena Medina RN; : Ani Rodriguez RN; Patient Care Tech: Yasiraroldo Jason; : IRWIN Majano  Admission Date: 3/4/2021         Pt seen and examined for neuro follow up for dementia with behavioral disturbances in the setting of new onset atrial fibrillation and recurrent falls with multiple rib fractures. Alert. Profoundly confused. Agitated at times. Does not follow commands. No obvious focal neuro deficits. No seizure activity reported. Appetite poor. Patient with dysphagia. NPO. Family met with hospice yesterday and has still not made a decision yet regarding hospice services. Still undecided by family on care    Vitals:    03/12/21 0755   BP: (!) 154/69   Pulse: 98   Resp: 17   Temp: 97.5 °F (36.4 °C)   SpO2: 94%      Review of Systems   Unable to perform ROS: Dementia   Constitutional: Positive for appetite change. Negative for fever. HENT: Positive for hearing loss. Negative for trouble swallowing. Respiratory: Negative for cough and wheezing. Cardiovascular: Negative for leg swelling. Skin: Negative for color change and rash. Neurological: Positive for weakness (  Generalized). Negative for tremors, seizures, syncope, facial asymmetry and speech difficulty.    Psychiatric/Behavioral: Positive for agitation, patent. The craniocervical junction is unremarkable. The visualized portions of the orbits are within normal limits, the globes are intact. The visualized portions of the paranasal sinuses are within normal limits. The calvarium and soft tissues are unremarkable. Impression    There are no acute intracranial changes, there is no evidence of hemorrhage or acute ischemia. MRA of the Head and Neck: No results found for this or any previous visit. No results found for this or any previous visit. No results found for this or any previous visit. CT of the Head:   Results for orders placed during the hospital encounter of 03/04/21   CT Head WO Contrast    Narrative CT Brain. Contrast medium:  without contrast.. History:  Confusion. Technical factors: CT imaging of the brain was obtained and formatted as 5 mm contiguous axial images. 2.5 mm contiguous axial images were obtained through the osseous structures. Sagittal and coronal reconstruction obtained during postprocessing. Comparison:  CT brain, September 16, 2020 MRI/MRA brain,  August 28, 2015. Findings:    Study limited secondary to motion artifact. Extra-axial spaces:  Normal.     Intracranial hemorrhage:  None. Ventricular system: Ventricles mildly enlarged. Sulci mildly prominent. Basal Cisterns:  Normal.    Cerebral Parenchyma: Bilateral symmetric periventricular areas decreased attenuation    Midline Shift:  None. Cerebellum:  Normal.     Paranasal sinuses and mastoid air cells:  Normal.    Visualized Orbits:  Normal.        Impression Impression:    Study limited secondary to motion artifact. No acute findings. Mild cerebral atrophy. Chronic ischemic white matter disease. All CT scans at this facility use dose modulation, iterative reconstruction, and/or weight based dosing when appropriate to reduce radiation dose to as low as reasonably achievable.    No results found for this or any previous visit. No results found for this or any previous visit. Carotid duplex: No results found for this or any previous visit. No results found for this or any previous visit. No results found for this or any previous visit. Echo No results found for this or any previous visit. Assessment/Plan:    Dementia with worsening with the possibility of underlying possible strokes which can occur in atrial fibrillation patient is not on anticoagulation patient has been falling. We will try and obtain an MRI of the brain if he can as she appears to be somewhat agitated we will perform this under sedation. Patient has gait ataxia with frontal gait disorder with significant dementia. I am awaiting her daughter for more history. Otherwise patient has a nonfocal examination but there may be some right-sided findings. Dementia with behavioral disturbances  Remeron has been initiated to help with sleep and appetite  New onset atrial fibrillation, oral anticoagulation not recommended due to patient's falls risk  Recurrent falls with multiple rib fractures  Right pleural effusion, IR consulted  MRI of the brain negative for any acute intracranial findings  Check B12 and folate  Palliative care on consult  Patient is now DNR CCA  Will follow    Dysphagia, n.p.o. Hospice consult pending    Patient remains profoundly confused with intermittent behavioral disturbances  N.p.o. secondary to dysphagia  Family has met with hospice and is deciding on hospice services  Will follow    Awaiting family's decision for hospice services and discharge planning. I have personally performed a face to face diagnostic evaluation on this patient, reviewed all data and investigations, and am the sole provider of all clinical decisions on the neurological status of this patient. no new changes, still very confused, family to decide on hospice      Alessandro Riley MD, Amara Owusu, American Board of Psychiatry & Neurology  Board Certified in Vascular Neurology  Board Certified in Neuromuscular Medicine  Certified in Neurorehabilitation     Electronically signed by COLT Bernard CNP on 3/12/2021 at 1:47 PM

## 2021-03-12 NOTE — PROGRESS NOTES
ST spoke with RNJamir. Family is still considering hospice. RN noted pt will have a difficult time participating in [facility] secondary to difficulty following directions. RN to speak with family and determine if they would like MBS completed. ST to await family decision. Continue with NPO until MBS is completed. RN, Jamir Naranjo, aware of recommendation.      Electronically signed by CHARMAINE Sauer on 3/12/2021 at 11:05 AM

## 2021-03-12 NOTE — FLOWSHEET NOTE
Pt's nightly remeron held due to pt being drowsy. Pt given bathed, galina care, mouth swabbed, hair washed and gown/diaper changed. Pt's call light within reach and bed alarm active.

## 2021-03-12 NOTE — PROGRESS NOTES
Physical Therapy Missed Treatment   Facility/Department: [facility] MED SURG D343/B195-39    NAME: Carlee Gaucher    : 2/10/1928 (80 y.o.)  MRN: [id number]    Account: [de-identified]  Gender: female      Per  note - pt plan is for d/c to home with hospice. Per protocol PT will not see pt on hospice. Please reorder PT if PT needs are identified    No eval completed. Nursing staff notified.         Trenton Rodrigues, PT, 21 at 3:12 PM

## 2021-03-12 NOTE — CARE COORDINATION
Per the RN, the patient's grand daughter Karime Haiedr is visiting the patient and talked with [facility]. The patient's grand daughter states her cousin is planning to take the patient home with hospice. The patient's grand daughter would like to know if the patient would qualify for medicaid to assist with the room and board charges at a nursing home. The LSW talked with [facility], and the patient's grand daughter wants to know if the patient qualifies for medicaid. Electronically signed by David Mann on 3/12/21 at 10:59 AM EST    The LSW called and talked to Farooq Maxwell Junior. Farooq Maxwell Junior, states she talked to the patient's daughter a few days ago and the patient does not qualify for medicaid. Per Farooq Maxwell Junior, the patient is over resources for medicaid (nursing home or community). The LSW met with the patient's grand daughter Karime Haider. The patient's grand daughter was given the [employer] phone number per her request for in the future. The patient's grand daughter states the patient will go to the patient's grand daughter's home and will need equipment delivered. The grand daughter states that the patient's niece is getting the room ready. The LSW updated Nonda Tiki that the patient could discharge potentially today or tomorrow depending on the delivery of equipment. Erich Womack states she will talk with her mom Tasneem Mckeon) and have her contact [facility] Ferdie Fitting). Erich Pore states the patient's son may also be able to meet with [facility] this afternoon. The LSW updated [facility], that the patients daughter, Erich Womack, and son, Meño, will be contacting her. Electronically signed by David Mann on 3/12/21 at 12:27 PM EST    The LSW called and talked to Cresc[facility] Hickman. Jimmie Navarrete, Intake at [facility], states she has not heard from the patient's daughter, Erich Womack.  Electronically signed by IRWIN Padilla on 3/12/21 at 2:11 PM EST    The LSW met with the 1W manager and the patient's RN. The LSW called and talked to [facility], and asked her to call the patient's daughter. Electronically signed by Kaitlin Christopher on 3/12/21 at 3:40 PM EST    [facility], called the LSW back. [facility], states the patient's daughter is waiting to talk with the hospital physician. Electronically signed by Kaitlin Christopher on 3/12/21 at 3:45 PM EST    The LSW and  met with the hospitalist.  The hospitalist states he talked with the patient's daughter she is agreeable to move forward with hospice. The hospitalist discussed with the patient's daughter - the patient can discharge tomorrow once the equipment is delivered. The hospitalist would like [facility] to contact the patient's daughter to set up a time to complete the consents. The LSW called [facility]. Fidelina Bello is the on call nurse and will call the LSW back. Electronically signed by Kaitlin Christopher on 3/12/21 at 4:04 PM EST    [facility] states she called the patient's daughter and she mentioned the patient is not discharged until Monday. [facility], to call the patient's daughter back again to schedule a time to [place] tomorrow am to sign the hospice consents. [facility], to document a meeting time in the patient's chart for 3/13/2021. The RN and  were updated.    Electronically signed by IRWIN Reeves Cha on 3/12/21 at 4:47 PM EST

## 2021-03-12 NOTE — PROGRESS NOTES
Progress Note  Patient: Irene Merlos  Unit/Bed: G382/S074-71  YOB: 1928  MRN: [id number]  Acct: [de-identified]   Admitting Diagnosis: New onset a-fib [facility]) [I48.91]  New onset a-fib [facility]) [I48.91]  Admit Date:  3/4/2021  Hospital Day: 8    Chief Complaint:AF MS changes Pleural effusion    Histories:  Past Medical History:   Diagnosis Date    CAD (coronary artery disease)     Chronic anemia     Dementia ([address]     [place] (hard of hearing)     Hyperlipidemia     Hypertension     Type II or unspecified type diabetes mellitus without mention of complication, not stated as uncontrolled      Past Surgical History:   Procedure Laterality Date    BLADDER SUSPENSION      CARDIAC CATHETERIZATION      CATARACT REMOVAL      CHOLECYSTECTOMY  1990    COLONOSCOPY  1-07    CORONARY ANGIOPLASTY WITH STENT PLACEMENT      CORONARY ANGIOPLASTY WITH STENT PLACEMENT  5-08    FOOT SURGERY      L    HYSTERECTOMY  1964     Family History   Problem Relation Age of Onset    Heart Disease Mother     Diabetes Mother     Cancer Father         LUNG     Social History     Socioeconomic History    Marital status:       Spouse name: None    Number of children: None    Years of education: None    Highest education level: None   Occupational History    None   Social Needs    Financial resource strain: None    Food insecurity     Worry: None     Inability: None    Transportation needs     Medical: None     Non-medical: None   Tobacco Use    Smoking status: Never Smoker    Smokeless tobacco: Never Used   Substance and Sexual Activity    Alcohol use: No    Drug use: No    Sexual activity: None   Lifestyle    Physical activity     Days per week: None     Minutes per session: None    Stress: None   Relationships    Social connections     Talks on phone: None     Gets together: None     Attends Confucianist service: None     Active member of club or organization: None     Attends meetings of clubs or organizations: None     Relationship status: None    Intimate partner violence     Fear of current or ex partner: None     Emotionally abused: None     Physically abused: None     Forced sexual activity: None   Other Topics Concern    None   Social History Narrative    None       Subjective/HPI  Sleeping but easily arouseable. remains confused. Appears comfortable. EKG:SR 80        Review of Systems:   Review of Systems  Not reliable    Physical Examination:    BP (!) 154/69   Pulse 98   Temp 97.5 °F (36.4 °C) (Axillary)   Resp 17   Ht 5' (1.524 m)   Wt 94 lb (42.6 kg)   LMP  (LMP Unknown)   SpO2 94%   BMI 18.36 kg/m²    Physical Exam   Constitutional: She appears healthy. No distress. HENT:   Normal cephalic and Atraumatic   Eyes: Pupils are equal, round, and reactive to light. Neck: Normal range of motion and thyroid normal. Neck supple. No JVD present. No neck adenopathy. No thyromegaly present. Cardiovascular: Normal rate, regular rhythm, intact distal pulses and normal pulses. Murmur heard. Pulmonary/Chest: Effort normal and breath sounds normal. She has no wheezes. She has no rales. She exhibits no tenderness. Abdominal: Soft. Bowel sounds are normal. There is no abdominal tenderness. Musculoskeletal: Normal range of motion. General: No tenderness or edema. Neurological: She exhibits altered mental status. Skin: Skin is warm. No cyanosis. Nails show no clubbing.        LABS:  CBC:   Lab Results   Component Value Date    WBC 7.3 03/12/2021    RBC 3.74 03/12/2021    RBC 4.05 11/07/2011    HGB 11.2 03/12/2021    HCT 34.2 03/12/2021    MCV 91.4 03/12/2021    MCH 29.9 03/12/2021    MCHC 32.7 03/12/2021    RDW 14.8 03/12/2021     03/12/2021    MPV 8.4 09/01/2015     CBC with Differential:    Lab Results   Component Value Date    WBC 7.3 03/12/2021    RBC 3.74 03/12/2021    RBC 4.05 11/07/2011    HGB 11.2 03/12/2021    HCT 34.2 03/12/2021     03/12/2021    MCV 91.4 03/12/2021    MCH 29.9 03/12/2021    MCHC 32.7 03/12/2021    RDW 14.8 03/12/2021    BANDSPCT 5 03/10/2015    LYMPHOPCT 10.7 03/12/2021    MONOPCT 7.1 03/12/2021    EOSPCT 20.5 11/07/2011    BASOPCT 0.7 03/12/2021    MONOSABS 0.5 03/12/2021    LYMPHSABS 0.8 03/12/2021    EOSABS 0.8 03/12/2021    BASOSABS 0.1 03/12/2021     CMP:    Lab Results   Component Value Date     03/12/2021    K 3.7 03/12/2021     03/12/2021    CO2 28 03/12/2021    BUN 36 03/12/2021    CREATININE 0.64 03/12/2021    GFRAA >60.0 03/12/2021    LABGLOM >60.0 03/12/2021    GLUCOSE 141 03/12/2021    GLUCOSE 121 11/07/2011    PROT 7.3 03/12/2021    LABALBU 3.0 03/12/2021    LABALBU 4.2 11/07/2011    CALCIUM 9.3 03/12/2021    BILITOT 0.5 03/12/2021    ALKPHOS 159 03/12/2021    AST 65 03/12/2021    ALT 45 03/12/2021     BMP:    Lab Results   Component Value Date     03/12/2021    K 3.7 03/12/2021     03/12/2021    CO2 28 03/12/2021    BUN 36 03/12/2021    LABALBU 3.0 03/12/2021    LABALBU 4.2 11/07/2011    CREATININE 0.64 03/12/2021    CALCIUM 9.3 03/12/2021    GFRAA >60.0 03/12/2021    LABGLOM >60.0 03/12/2021    GLUCOSE 141 03/12/2021    GLUCOSE 121 11/07/2011     Magnesium:    Lab Results   Component Value Date    MG 2.0 03/09/2021     Troponin:    Lab Results   Component Value Date    TROPONINI <0.010 03/04/2021        Active Hospital Problems    Diagnosis Date Noted    Severe malnutrition ([address] [E43] 03/11/2021     Priority: Low    Multiple closed fractures of ribs of right side [S22.41XA]      Priority: Low    New onset [facility] [I48.91] 03/04/2021     Priority: Low    Nausea & vomiting [R11.2] 03/04/2021     Priority: Low    Falls [W19. XXXA] 03/04/2021     Priority: Low    Weakness [R53.1] 03/04/2021     Priority: Low    Hyponatremia [E87.1] 03/04/2021     Priority: Low    Pleural effusion, right [J90] 03/04/2021     Priority: Low    Dementia ([facility] [F03.90]      Priority: Low    Type 2 diabetes mellitus with complication ([facility] [J41.4] 12/16/2015     Priority: Low    CAD (coronary artery disease) [I25.10]      Priority: Low        Assessment/Plan:  1. AF- converted to SR. Remains in SR  2. HTN - uncontrolled. Failed swallow and is NPO. Dc PO BB. Add Catapres patch. - can advance dose as tolerated. 3. Advanced Dementia. 4. Echo LVEF normal   5. Hospice evaluation in progress.        Electronically signed by Izzy Odom MD on 3/12/2021 at 8:14 AM

## 2021-03-12 NOTE — PROGRESS NOTES
Patients daughter David Lau called and wanted to know \"if Dr. Jamee Mcdowell was going to call back to let her know if he received approval for her mom to be discharged Monday or Tuesday\". Re-clarified the plan of care for her mom, that  Saturday morning she was to meet/sign for  Hospice for her mother  and  then the equipment would be delivered to the [facility]. Clarified the patient will be discharged to the [facility]  home after the equipment has arrived. David Lau stated she never realized her mom was  ready for discharge, however, clarified that we were waiting for her brother to decide if Hospice was okay;  patient has been cleared for discharge. Dr. Jamee Mcdowell was notified/ updated on David Lau calling and inquiring if the discharge was approved for Monday or Tuesday. Plan is still possible discharge Saturday pending the daughter meeting with Hospice and the delivery of equipment necessary to care for the patient.

## 2021-03-13 VITALS
HEIGHT: 60 IN | DIASTOLIC BLOOD PRESSURE: 74 MMHG | WEIGHT: 94 LBS | RESPIRATION RATE: 18 BRPM | OXYGEN SATURATION: 97 % | SYSTOLIC BLOOD PRESSURE: 159 MMHG | TEMPERATURE: 98.1 F | BODY MASS INDEX: 18.46 KG/M2 | HEART RATE: 81 BPM

## 2021-03-13 LAB
ALBUMIN SERPL-MCNC: 3.1 G/DL (ref 3.5–4.6)
ALP BLD-CCNC: 155 U/L (ref 40–130)
ALT SERPL-CCNC: 37 U/L (ref 0–33)
ANION GAP SERPL CALCULATED.3IONS-SCNC: 10 MEQ/L (ref 9–15)
AST SERPL-CCNC: 43 U/L (ref 0–35)
BASOPHILS ABSOLUTE: 0 K/UL (ref 0–0.2)
BASOPHILS RELATIVE PERCENT: 0.6 %
BILIRUB SERPL-MCNC: 0.5 MG/DL (ref 0.2–0.7)
BUN BLDV-MCNC: 30 MG/DL (ref 8–23)
CALCIUM SERPL-MCNC: 9.5 MG/DL (ref 8.5–9.9)
CHLORIDE BLD-SCNC: 108 MEQ/L (ref 95–107)
CO2: 27 MEQ/L (ref 20–31)
CREAT SERPL-MCNC: 0.59 MG/DL (ref 0.5–0.9)
EOSINOPHILS ABSOLUTE: 0.7 K/UL (ref 0–0.7)
EOSINOPHILS RELATIVE PERCENT: 9.5 %
GFR AFRICAN AMERICAN: >60
GFR NON-AFRICAN AMERICAN: >60
GLOBULIN: 4.3 G/DL (ref 2.3–3.5)
GLUCOSE BLD-MCNC: 130 MG/DL (ref 60–115)
GLUCOSE BLD-MCNC: 142 MG/DL (ref 60–115)
GLUCOSE BLD-MCNC: 143 MG/DL (ref 70–99)
HCT VFR BLD CALC: 35 % (ref 37–47)
HEMOGLOBIN: 11.5 G/DL (ref 12–16)
LYMPHOCYTES ABSOLUTE: 0.8 K/UL (ref 1–4.8)
LYMPHOCYTES RELATIVE PERCENT: 12.2 %
MAGNESIUM: 2.1 MG/DL (ref 1.7–2.4)
MCH RBC QN AUTO: 30.1 PG (ref 27–31.3)
MCHC RBC AUTO-ENTMCNC: 32.9 % (ref 33–37)
MCV RBC AUTO: 91.7 FL (ref 82–100)
MONOCYTES ABSOLUTE: 0.6 K/UL (ref 0.2–0.8)
MONOCYTES RELATIVE PERCENT: 8.7 %
NEUTROPHILS ABSOLUTE: 4.8 K/UL (ref 1.4–6.5)
NEUTROPHILS RELATIVE PERCENT: 69 %
PDW BLD-RTO: 15 % (ref 11.5–14.5)
PERFORMED ON: ABNORMAL
PERFORMED ON: ABNORMAL
PLATELET # BLD: 406 K/UL (ref 130–400)
POTASSIUM REFLEX MAGNESIUM: 3.4 MEQ/L (ref 3.4–4.9)
RBC # BLD: 3.82 M/UL (ref 4.2–5.4)
SODIUM BLD-SCNC: 145 MEQ/L (ref 135–144)
TOTAL PROTEIN: 7.4 G/DL (ref 6.3–8)
WBC # BLD: 6.9 K/UL (ref 4.8–10.8)

## 2021-03-13 PROCEDURE — 36415 COLL VENOUS BLD VENIPUNCTURE: CPT

## 2021-03-13 PROCEDURE — 99232 SBSQ HOSP IP/OBS MODERATE 35: CPT | Performed by: INTERNAL MEDICINE

## 2021-03-13 PROCEDURE — 85025 COMPLETE CBC W/AUTO DIFF WBC: CPT

## 2021-03-13 PROCEDURE — 80053 COMPREHEN METABOLIC PANEL: CPT

## 2021-03-13 PROCEDURE — 83735 ASSAY OF MAGNESIUM: CPT

## 2021-03-13 RX ORDER — CLONIDINE 0.2 MG/24H
1 PATCH, EXTENDED RELEASE TRANSDERMAL WEEKLY
Qty: 4 PATCH | Refills: 1 | Status: SHIPPED | OUTPATIENT
Start: 2021-03-17

## 2021-03-13 NOTE — DISCHARGE INSTR - COC
Continuity of Care Form    Patient Name: Mati Alan   :  2/10/1928  MRN:  [id number]    Admit date:  3/4/2021  Discharge date:  3/16/2021    Code Status Order: DNR-CCA   Advance Directives:     Admitting Physician:  Yesenia Arellano MD  PCP: Ebenezer Sweet MD    Discharging Nurse: laith  [address] Unit/Room#: H891/T993-55  Discharging Unit Phone Number: [phone]    Emergency Contact:   Extended Emergency Contact Information  Primary Emergency Contact: Adelaida Gilford [address] Phone: [phone]  Relation: Child  Secondary Emergency Contact: Jaciel Ceron Phone: [phone]  Relation: Child    Past Surgical History:  Past Surgical History:   Procedure Laterality Date    BLADDER SUSPENSION      CARDIAC CATHETERIZATION      CATARACT REMOVAL      CHOLECYSTECTOMY      COLONOSCOPY      CORONARY ANGIOPLASTY WITH STENT PLACEMENT      CORONARY ANGIOPLASTY WITH STENT PLACEMENT      FOOT SURGERY      L    HYSTERECTOMY  1964       Immunization History:   Immunization History   Administered Date(s) Administered    Influenza 2011, 10/24/2012, 10/11/2013    Influenza Virus Vaccine 2014    Influenza, High Dose (Fluzone 65 yrs and older) 2015, 2017, 2019    Pneumococcal Conjugate 13-valent (Gmcknxo23) 2015    Pneumococcal Polysaccharide (Hkojvnutp05) 2017    Tdap (Boostrix, Adacel) 2014, 2019       Active Problems:  Patient Active Problem List   Diagnosis Code    Hyperlipidemia E78.5    Chronic anemia D64.9    CAD (coronary artery disease) I25.10    [place] (hard of hearing[place] H91.90    Vitamin B12 deficiency E53.8    Dizziness, nonspecific R42    Osteoarthritis M19.90    Impaired mobility and activities of daily living Z74.09, Z78.9    Neck pain M54.2    Type 2 diabetes mellitus with complication ([address] V30.5    New onset a-fib ([facility] I48.91    Nausea & vomiting R11.2    Falls W19. XXXA    Weakness - No ventilator support    Rehab Therapies: {THERAPEUTIC INTERVENTION:3232629199}  Weight Bearing Status/Restrictions: {[facility] Weight Bearin}  Other Medical Equipment (for information only, NOT a DME order):  hospital bed  Other Treatments: ***    Patient's personal belongings (please select all that are sent with patient):  {[facility] DME Belongings:332593123}    RN SIGNATURE:  {Esignature:456405389}    CASE MANAGEMENT/SOCIAL WORK SECTION    Inpatient Status Date: ***    Readmission Risk Assessment Score:  Readmission Risk              Risk of Unplanned Readmission:        22           Discharging to Facility/ Agency   · Name:   · Address:  · Phone:  · Fax:    Dialysis Facility (if applicable)   · Name:  · Address:  · Dialysis Schedule:  · Phone:  · Fax:    / signature: {Esignature:709870114}    PHYSICIAN SECTION    Prognosis: {Prognosis:7746551323}    Condition at Discharge: [address] [place] [address] Patient Condition:950681750}    Rehab Potential (if transferring to Rehab): {Prognosis:1963728530}    Recommended Labs or Other Treatments After Discharge: ***    Physician Certification: I certify the above information and transfer of Yakov Nash  is necessary for the continuing treatment of the diagnosis listed and that she requires {Admit to Appropriate Level of Care:56551} for {GREATER/LESS:891966657} 30 days.      Update Admission H&P: {CHP DME Changes in NVUOU:844841492}    PHYSICIAN SIGNATURE:  {Esignature:885533746}

## 2021-03-13 NOTE — FLOWSHEET NOTE
Nurse in to assess pt and reposition her. The pt is alert to self only. The pt is difficult to understand at times as she has no top teeth and her mouth is dry. Mouth care done at this time. The pt was stating that her left side hurts at times. Pt did sustain a fall at home and fractured some ribs. Bed alarm on. Head of bed up. [facility] here. Pts depends changed prior to her leaving. Pt has no iv access.

## 2021-03-13 NOTE — PROGRESS NOTES
Reviewed    Infusion Medications:    dextrose       Scheduled Medications:    cloNIDine  1 patch Transdermal Weekly    mirtazapine  7.5 mg Oral Nightly    sodium chloride flush  10 mL Intravenous 2 times per day    insulin lispro  0-12 Units Subcutaneous TID WC    insulin lispro  0-6 Units Subcutaneous Nightly     PRN Meds: metoprolol, haloperidol lactate, sodium chloride flush, promethazine **OR** ondansetron, polyethylene glycol, acetaminophen **OR** acetaminophen, glucose, dextrose, glucagon (rDNA), dextrose    Labs:   Recent Labs     03/10/21  0616 03/11/21  0549 03/12/21  0526   WBC 9.8 9.5 7.3   HGB 10.2* 10.9* 11.2*   HCT 31.8* 32.3* 34.2*    412* 408*     Recent Labs     03/10/21  0616 03/11/21  0549 03/12/21  0525    142 145*   K 4.0 3.7 3.7    105 105   CO2 20 22 28   BUN 25* 29* 36*   CREATININE 0.64 0.54 0.64   CALCIUM 9.3 9.5 9.3     Recent Labs     03/10/21  0616 03/11/21  0549 03/12/21  0525   AST 61* 71* 65*   ALT 26 39* 45*   BILITOT 0.9* 1.0* 0.5   ALKPHOS 141* 202* 159*     No results for input(s): INR in the last 72 hours. No results for input(s): Vincdanitza Mcallisterri in the last 72 hours. Urinalysis:   Lab Results   Component Value Date    NITRU Negative 03/04/2021    WBCUA 0-2 03/04/2021    BACTERIA Negative 03/04/2021    RBCUA 0-2 03/04/2021    BLOODU Negative 03/04/2021    SPECGRAV 1.015 03/04/2021    GLUCOSEU Negative 03/04/2021       Radiology:   Most recent    Chest CT      WITH CONTRAST:No results found for this or any previous visit. WITHOUT CONTRAST: No results found for this or any previous visit. CXR      2-view:   Results for orders placed during the hospital encounter of 09/29/18   XR CHEST STANDARD (2 VW)    Narrative EXAMINATION: XR CHEST (2 VW)    CLINICAL HISTORY: SYNCOPE    COMPARISONS: AUGUST 20 17,015    FINDINGS: Osteopenia. Remote fracture healed right humeral neck. Patient leaning to right.  Cardiopericardial silhouette normal. Aorta tortuous and calcified. Lungs clear. Impression NO ACUTE CARDIOPULMONARY DISEASE        Portable:   Results for orders placed during the hospital encounter of 09/16/20   XR CHEST PORTABLE    Narrative EXAMINATION: CHEST PORTABLE VIEW     CLINICAL HISTORY: Midsternal chest pain    COMPARISONS: May 3, 2019     FINDINGS:    Single  views of the chest is submitted. The cardiac silhouette is enlarged. Pulmonary vascular attenuated  Right sided trachea. No focal infiltrates. No Pneumothoraces. Probable old healed fracture right humeral head with associated severe degenerative joint disease                                                                                    Impression NO ACUTE ACTIVE CARDIOPULMONARY PROCESS       Echo No results found for this or any previous visit. Assessment/Plan:    Active Hospital Problems    Diagnosis Date Noted    Severe malnutrition ([facility] [E43] 03/11/2021    Multiple closed fractures of ribs of right side [S22.41XA]     New onset a-fib ([address] [I48.91] 03/04/2021    Nausea & vomiting [R11.2] 03/04/2021    Falls [W19. XXXA] 03/04/2021    Weakness [R53.1] 03/04/2021    Hyponatremia [E87.1] 03/04/2021    Pleural effusion, right [J90] 03/04/2021    Dementia ([facility] [F03.90]     Type 2 diabetes mellitus with complication ([facility] [X68.3] 12/16/2015    CAD (coronary artery disease) [I25.10]      Atrial Fibrillation with RVR: hold anticoagulation for possible procedure inpt. Poor candidate for chronic anticoagulation given frequent falls. Continue beta blocker. Cardiology following. Right pleural effusion: Consult to IR for thoracentesis. Consult pulmonary for recommendations regarding attempting any procedures given her advanced age and poor functional status. . Lateral cxr imaging shows loculated effusion    Dementia with confusion: daughter notes dramatic worsening compared to baseline. Ct head negative. No infectious cause.  Consult to neuro per family request. Given the patient's relatively advanced dementia with poor functional status the patient does have a very poor prognosis. This is left with family to discuss further plans of care   3/8 - d/w daughter by phone. Arranging hospice meeting today/tomorrow when able. 3/9 - hospice scheduled family meeting morning 3/10   3/10 - family unable to make a final decision today, will hold dc until 3/11 for decision making   3/11 - family unable to make a final decision. D/w daughter again, talking over with Josh See who may take patient home   3/12 - Daughter and granddaughter have been told that discharge is planned for tomorrow, 3/13 after hospice delivers equipment, however family has not signed paperwork. Counseled that we cannot keep patient for several more days to accommodate family wishes without inpatient active treatment. Falls with multiple rib fracture: PT/OT evaluation. se Remeron before bed, haldol 1mg IM PRN for agitation. DVT PPX held for possible thoracentesis. Additional work up or/and treatment plan may be added today or then after based on clinical progression. I am managing a portion of pt care. Some medical issues are handled byother specialists. Additional work up and treatment should be done in out pt setting by pt PCP and other out pt providers. In addition to examining and evaluating pt, I spent additional time explaining care, normaland abnormal findings, and treatment plan. All of pt questions were answered. Counseling, diet and education were provided. Case will be discussed with nursing staff when appropriate. Family will be updated if and whenappropriate.       Electronically signed by Gabriel Renteria MD on 3/13/2021 at 12:37 AM

## 2021-03-13 NOTE — PROGRESS NOTES
Spoke with granddaughter Edgar Betancourt is to have pt be DC'd into her care. [facility] transportation arranged for 1400 . CC Tea updated.

## 2021-03-13 NOTE — PROGRESS NOTES
Progress Note  Patient: Jose Luis Arriola  Unit/Bed: S609/Z526-58  YOB: 1928  MRN: [id number]  Acct: [de-identified]   Admitting Diagnosis: New onset a-fib [facility]) [I48.91]  New onset a-fib [facility]) [I48.91]  Admit Date:  3/4/2021  Hospital Day: 9    Chief Complaint:AF MS changes Pleural effusion    Histories:  Past Medical History:   Diagnosis Date    CAD (coronary artery disease)     Chronic anemia     Dementia ([address]     [place] (hard of hearing)     Hyperlipidemia     Hypertension     Type II or unspecified type diabetes mellitus without mention of complication, not stated as uncontrolled      Past Surgical History:   Procedure Laterality Date    BLADDER SUSPENSION      CARDIAC CATHETERIZATION      CATARACT REMOVAL      CHOLECYSTECTOMY  1990    COLONOSCOPY  1-07    CORONARY ANGIOPLASTY WITH STENT PLACEMENT      CORONARY ANGIOPLASTY WITH STENT PLACEMENT  5-08    FOOT SURGERY      L    HYSTERECTOMY  1964     Family History   Problem Relation Age of Onset    Heart Disease Mother     Diabetes Mother     Cancer Father         LUNG     Social History     Socioeconomic History    Marital status:       Spouse name: None    Number of children: None    Years of education: None    Highest education level: None   Occupational History    None   Social Needs    Financial resource strain: None    Food insecurity     Worry: None     Inability: None    Transportation needs     Medical: None     Non-medical: None   Tobacco Use    Smoking status: Never Smoker    Smokeless tobacco: Never Used   Substance and Sexual Activity    Alcohol use: No    Drug use: No    Sexual activity: None   Lifestyle    Physical activity     Days per week: None     Minutes per session: None    Stress: None   Relationships    Social connections     Talks on phone: None     Gets together: None     Attends [employer] service: None     Active member of club or organization: None     Attends meetings of clubs or 03/13/2021    MCH 30.1 03/13/2021    MCHC 32.9 03/13/2021    RDW 15.0 03/13/2021    BANDSPCT 5 03/10/2015    LYMPHOPCT 12.2 03/13/2021    MONOPCT 8.7 03/13/2021    EOSPCT 20.5 11/07/2011    BASOPCT 0.6 03/13/2021    MONOSABS 0.6 03/13/2021    LYMPHSABS 0.8 03/13/2021    EOSABS 0.7 03/13/2021    BASOSABS 0.0 03/13/2021     CMP:    Lab Results   Component Value Date     03/13/2021    K 3.4 03/13/2021     03/13/2021    CO2 27 03/13/2021    BUN 30 03/13/2021    CREATININE 0.59 03/13/2021    GFRAA >60.0 03/13/2021    LABGLOM >60.0 03/13/2021    GLUCOSE 143 03/13/2021    GLUCOSE 121 11/07/2011    PROT 7.4 03/13/2021    LABALBU 3.1 03/13/2021    LABALBU 4.2 11/07/2011    CALCIUM 9.5 03/13/2021    BILITOT 0.5 03/13/2021    ALKPHOS 155 03/13/2021    AST 43 03/13/2021    ALT 37 03/13/2021     BMP:    Lab Results   Component Value Date     03/13/2021    K 3.4 03/13/2021     03/13/2021    CO2 27 03/13/2021    BUN 30 03/13/2021    LABALBU 3.1 03/13/2021    LABALBU 4.2 11/07/2011    CREATININE 0.59 03/13/2021    CALCIUM 9.5 03/13/2021    GFRAA >60.0 03/13/2021    LABGLOM >60.0 03/13/2021    GLUCOSE 143 03/13/2021    GLUCOSE 121 11/07/2011     Magnesium:    Lab Results   Component Value Date    MG 2.0 03/09/2021     Troponin:    Lab Results   Component Value Date    TROPONINI <0.010 03/04/2021        Active Hospital Problems    Diagnosis Date Noted    Severe malnutrition ([address] [E43] 03/11/2021     Priority: Low    Multiple closed fractures of ribs of right side [S22.41XA]      Priority: Low    New onset a-fib ([facility] [I48.91] 03/04/2021     Priority: Low    Nausea & vomiting [R11.2] 03/04/2021     Priority: [address] [W19. XXXA] 03/04/2021     Priority: Low    Weakness [R53.1] 03/04/2021     Priority: Low    Hyponatremia [E87.1] 03/04/2021     Priority: Low    Pleural effusion, right [J90] 03/04/2021     Priority: Low    Dementia ([facility] [F03.90]      Priority: Low    Type 2 diabetes mellitus with complication ([facility] [K10.7] 12/16/2015     Priority: Low    CAD (coronary artery disease) [I25.10]      Priority: Low        Assessment/Plan:  1. AF- converted to SR. Remains in SR  2. HTN - uncontrolled. Failed swallow and is NPO. Dc PO BB. Add Catapres patch. - can advance dose as tolerated. - BP more stable  3. Advanced Dementia. 4. Echo LVEF normal   5. Hospice evaluation in progress.        Electronically signed by Radha Cuevas MD on 3/13/2021 at 8:19 AM

## 2021-03-13 NOTE — DISCHARGE SUMMARY
Physician Discharge Summary     Patient ID:  Deric Olivera  [id number]  18 y.o.  2/10/1928    Admit date: 3/4/2021    Discharge date : 03/13/21     Admitting Physician: Jeannie Martinez MD     Discharge Physician: Mikki Asif MD     Admission Diagnoses: New onset a-fib [facility]) [I48.91]  New onset a-fib [facility]) [I48.91]    Discharge Diagnoses: New onset afib with rvr, right pleural effusion, dementia, falls, multiple rib fractures    Admission Condition: fair    Discharged Condition: fair    Hospital Course:   Atrial Fibrillation with RVR: hold anticoagulation for possible procedure inpt. Poor candidate for chronic anticoagulation given frequent falls. Continue beta blocker. Cardiology following.      Right pleural effusion: Consult to IR for thoracentesis. Consult pulmonary for recommendations regarding attempting any procedures given her advanced age and poor functional status. . Lateral cxr imaging shows loculated effusion     Dementia with confusion: daughter notes dramatic worsening compared to baseline. Ct head negative. No infectious cause. Consult to neuro per family request.  Given the patient's relatively advanced dementia with poor functional status the patient does have a very poor prognosis. This is left with family to discuss further plans of care              3/8 - d/w daughter by phone. Arranging hospice meeting today/tomorrow when able. 3/9 - hospice scheduled family meeting morning 3/10              3/10 - family unable to make a final decision today, will hold dc until 3/11 for decision making              3/11 - family unable to make a final decision. D/w daughter again, talking over with [facility] who may take patient home              3/12 - Daughter and granddaughter have been told that discharge is planned for tomorrow, 3/13 after hospice delivers equipment, however family has not signed paperwork.   Counseled that we cannot keep patient for several more days to accommodate family input(s): Stanislav Speaks in the last 72 hours. Urinalysis:   Lab Results   Component Value Date    NITRU Negative 03/04/2021    WBCUA 0-2 03/04/2021    BACTERIA Negative 03/04/2021    RBCUA 0-2 03/04/2021    BLOODU Negative 03/04/2021    SPECGRAV 1.015 03/04/2021    GLUCOSEU Negative 03/04/2021       Radiology:   Most recent    Chest CT      WITH CONTRAST:No results found for this or any previous visit. WITHOUT CONTRAST: No results found for this or any previous visit. CXR      2-view:   Results for orders placed during the hospital encounter of 09/29/18   XR CHEST STANDARD (2 VW)    Narrative EXAMINATION: XR CHEST (2 VW)    CLINICAL HISTORY: SYNCOPE    COMPARISONS: AUGUST 20 17,015    FINDINGS: Osteopenia. Remote fracture healed right humeral neck. Patient leaning to right. Cardiopericardial silhouette normal. Aorta tortuous and calcified. Lungs clear. Impression NO ACUTE CARDIOPULMONARY DISEASE        Portable:   Results for orders placed during the hospital encounter of 03/04/21   XR CHEST PORTABLE    Narrative EXAMINATION: XR CHEST PORTABLE    DATE AND TIME:3/7/2021 5:37 AM     CLINICAL HISTORY: Shortness of breath, right pleural effusion. COMPARISONS: March 6, 2021      FINDINGS: Bilateral effusions right greater than left with compressive atelectasis at the right base. Right effusion relatively free flowing. Impression LARGE RIGHT EFFUSION. Echo No results found for this or any previous visit. Disposition: home with hospice    In process/preliminary results:  Outstanding Order Results     No orders found from 2/3/2021 to 3/5/2021.           Patient Instructions:   Current Discharge Medication List      START taking these medications    Details   cloNIDine (CATAPRES) 0.2 MG/24HR PTWK Place 1 patch onto the skin once a week  Qty: 4 patch, Refills: 1         CONTINUE these medications which have NOT CHANGED    Details   ondansetron (ZOFRAN ODT) 4 MG disintegrating tablet Take 1 tablet by mouth every 8 hours as needed for Nausea  Qty: 9 tablet, Refills: 0      famotidine (PEPCID) 20 MG tablet Take 1 tablet by mouth 2 times daily  Qty: 10 tablet, Refills: 0      donepezil (ARICEPT) 5 MG tablet TAKE ONE TABLET BY MOUTH nightly  Qty: 30 tablet, Refills: 5      metFORMIN (GLUCOPHAGE) 500 MG tablet TAKE ONE TABLET BY MOUTH EVERY DAY  Qty: 90 tablet, Refills: 1      nitroGLYCERIN (NITROSTAT) 0.4 MG SL tablet DISSOLVE ONE TABLET UNDER TONGUE EVERY 5 MINUTES UP TO 3 TIMES AS NEEDED FOR CHEST PAIN, AS DIRECTED   Qty: 25 tablet, Refills: 1      Glucose Blood (BLOOD GLUCOSE TEST STRIPS) STRP Test bid--dx 250.00/E11.8  Qty: 100 strip, Refills: 5    Associated Diagnoses: Type 2 diabetes mellitus with complication, unspecified long term insulin use status      Lancets MISC Test bid--dx 250.00/E11.8  Qty: 100 each, Refills: 5    Associated Diagnoses: Type 2 diabetes mellitus with complication, unspecified long term insulin use status         STOP taking these medications       clopidogrel (PLAVIX) 75 MG tablet Comments:   Reason for Stopping:             Activity: bedrest  Diet: as able  Wound Care: none needed    Follow-up with hospice team as scheduled.     DC time 35 minutes    Signed:  Electronically signed by Lona Zafar MD on 3/13/2021 at 1:03 PM